# Patient Record
Sex: MALE | Race: WHITE | Employment: UNEMPLOYED | URBAN - METROPOLITAN AREA
[De-identification: names, ages, dates, MRNs, and addresses within clinical notes are randomized per-mention and may not be internally consistent; named-entity substitution may affect disease eponyms.]

---

## 2017-06-05 ENCOUNTER — OFFICE VISIT (OUTPATIENT)
Dept: FAMILY MEDICINE CLINIC | Age: 27
End: 2017-06-05

## 2017-06-05 VITALS
WEIGHT: 174 LBS | BODY MASS INDEX: 32.02 KG/M2 | DIASTOLIC BLOOD PRESSURE: 76 MMHG | HEIGHT: 62 IN | HEART RATE: 93 BPM | SYSTOLIC BLOOD PRESSURE: 100 MMHG | TEMPERATURE: 98.5 F | RESPIRATION RATE: 16 BRPM | OXYGEN SATURATION: 99 %

## 2017-06-05 DIAGNOSIS — J20.9 ACUTE BRONCHITIS, UNSPECIFIED ORGANISM: Primary | ICD-10-CM

## 2017-06-05 DIAGNOSIS — R05.9 COUGH: ICD-10-CM

## 2017-06-05 PROCEDURE — 99213 OFFICE O/P EST LOW 20 MIN: CPT | Performed by: INTERNAL MEDICINE

## 2017-06-05 RX ORDER — DEXTROMETHORPHAN HYDROBROMIDE AND PROMETHAZINE HYDROCHLORIDE 15; 6.25 MG/5ML; MG/5ML
5 SYRUP ORAL 4 TIMES DAILY PRN
Qty: 180 ML | Refills: 0 | Status: SHIPPED | OUTPATIENT
Start: 2017-06-05 | End: 2017-06-12

## 2017-06-05 RX ORDER — AMOXICILLIN 500 MG/1
500 CAPSULE ORAL 3 TIMES DAILY
Qty: 30 CAPSULE | Refills: 0 | Status: SHIPPED | OUTPATIENT
Start: 2017-06-05 | End: 2017-06-15

## 2017-06-05 ASSESSMENT — ENCOUNTER SYMPTOMS
SHORTNESS OF BREATH: 0
SORE THROAT: 1
COUGH: 1

## 2018-01-18 ENCOUNTER — OFFICE VISIT (OUTPATIENT)
Dept: FAMILY MEDICINE CLINIC | Age: 28
End: 2018-01-18

## 2018-01-18 VITALS
SYSTOLIC BLOOD PRESSURE: 112 MMHG | WEIGHT: 143 LBS | HEIGHT: 62 IN | HEART RATE: 63 BPM | DIASTOLIC BLOOD PRESSURE: 64 MMHG | BODY MASS INDEX: 26.31 KG/M2 | OXYGEN SATURATION: 98 %

## 2018-01-18 DIAGNOSIS — Z00.00 WELL ADULT EXAM: Primary | ICD-10-CM

## 2018-01-18 DIAGNOSIS — Z00.00 WELL ADULT EXAM: ICD-10-CM

## 2018-01-18 LAB
CHOLESTEROL, TOTAL: 152 MG/DL (ref 0–199)
GLUCOSE BLD-MCNC: 94 MG/DL (ref 70–99)
HDLC SERPL-MCNC: 59 MG/DL (ref 40–60)
LDL CHOLESTEROL CALCULATED: 82 MG/DL
TRIGL SERPL-MCNC: 54 MG/DL (ref 0–150)
VLDLC SERPL CALC-MCNC: 11 MG/DL

## 2018-01-18 PROCEDURE — 99395 PREV VISIT EST AGE 18-39: CPT | Performed by: INTERNAL MEDICINE

## 2018-01-18 ASSESSMENT — PATIENT HEALTH QUESTIONNAIRE - PHQ9
2. FEELING DOWN, DEPRESSED OR HOPELESS: 0
SUM OF ALL RESPONSES TO PHQ9 QUESTIONS 1 & 2: 0
SUM OF ALL RESPONSES TO PHQ QUESTIONS 1-9: 0
1. LITTLE INTEREST OR PLEASURE IN DOING THINGS: 0

## 2018-01-18 NOTE — PATIENT INSTRUCTIONS
exposed skin. · See a dentist one or two times a year for checkups and to have your teeth cleaned. · Wear a seat belt in the car. · Drink alcohol in moderation, if at all. That means no more than 2 drinks a day for men and 1 drink a day for women. Follow your doctor's advice about when to have certain tests. These tests can spot problems early. For everyone  · Cholesterol. Have the fat (cholesterol) in your blood tested after age 21. Your doctor will tell you how often to have this done based on your age, family history, or other things that can increase your risk for heart disease. · Blood pressure. Have your blood pressure checked during a routine doctor visit. Your doctor will tell you how often to check your blood pressure based on your age, your blood pressure results, and other factors. · Vision. Talk with your doctor about how often to have a glaucoma test.  · Diabetes. Ask your doctor whether you should have tests for diabetes. · Colon cancer. Have a test for colon cancer at age 48. You may have one of several tests. If you are younger than 48, you may need a test earlier if you have any risk factors. Risk factors include whether you already had a precancerous polyp removed from your colon or whether your parent, brother, sister, or child has had colon cancer. For women  · Breast exam and mammogram. Talk to your doctor about when you should have a clinical breast exam and a mammogram. Medical experts differ on whether and how often women under 50 should have these tests. Your doctor can help you decide what is right for you. · Pap test and pelvic exam. Begin Pap tests at age 24. A Pap test is the best way to find cervical cancer. The test often is part of a pelvic exam. Ask how often to have this test.  · Tests for sexually transmitted infections (STIs). Ask whether you should have tests for STIs.  You may be at risk if you have sex with more than one person, especially if your partners do not wear appointments, and call your doctor if you are having problems. It's also a good idea to know your test results and keep a list of the medicines you take. How can you care for yourself at home? · Reach and stay at a healthy weight. This will lower your risk for many problems, such as obesity, diabetes, heart disease, and high blood pressure. · Get at least 30 minutes of physical activity on most days of the week. Walking is a good choice. You also may want to do other activities, such as running, swimming, cycling, or playing tennis or team sports. Discuss any changes in your exercise program with your doctor. · Do not smoke or allow others to smoke around you. If you need help quitting, talk to your doctor about stop-smoking programs and medicines. These can increase your chances of quitting for good. · Talk to your doctor about whether you have any risk factors for sexually transmitted infections (STIs). Having one sex partner (who does not have STIs and does not have sex with anyone else) is a good way to avoid these infections. · Use birth control if you do not want to have children at this time. Talk with your doctor about the choices available and what might be best for you. · Protect your skin from too much sun. When you're outdoors from 10 a.m. to 4 p.m., stay in the shade or cover up with clothing and a hat with a wide brim. Wear sunglasses that block UV rays. Even when it's cloudy, put broad-spectrum sunscreen (SPF 30 or higher) on any exposed skin. · See a dentist one or two times a year for checkups and to have your teeth cleaned. · Wear a seat belt in the car. · Drink alcohol in moderation, if at all. That means no more than 2 drinks a day for men and 1 drink a day for women. Follow your doctor's advice about when to have certain tests. These tests can spot problems early. For everyone  · Cholesterol. Have the fat (cholesterol) in your blood tested after age 21.  Your doctor will tell you how should have this test. Some experts suggest it if you are older than 39 and are -American or have a father or brother who got prostate cancer when he was younger than 72. When should you call for help? Watch closely for changes in your health, and be sure to contact your doctor if you have any problems or symptoms that concern you. Where can you learn more? Go to [email or web address] and sign in to your [employer] account. Enter P072 in the Spout box to learn more about \"Well Visit, Ages 25 to 48: Care Instructions. \"     If you do not have an account, please click on the \"Sign Up Now\" link. Current as of: May 12, 2017  Content Version: 11.5  © 9272-1162 Healthwise, Incorporated. Care instructions adapted under license by [facility] ([facility]). If you have questions about a medical condition or this instruction, always ask your healthcare professional. [address] any warranty or liability for your use of this information.

## 2018-01-19 LAB
ESTIMATED AVERAGE GLUCOSE: 108.3 MG/DL
HBA1C MFR BLD: 5.4 %

## 2018-04-09 ENCOUNTER — OFFICE VISIT (OUTPATIENT)
Dept: FAMILY MEDICINE CLINIC | Age: 28
End: 2018-04-09

## 2018-04-09 VITALS
OXYGEN SATURATION: 99 % | SYSTOLIC BLOOD PRESSURE: 140 MMHG | HEART RATE: 99 BPM | DIASTOLIC BLOOD PRESSURE: 76 MMHG | BODY MASS INDEX: 23.74 KG/M2 | HEIGHT: 62 IN | WEIGHT: 129 LBS

## 2018-04-09 DIAGNOSIS — H61.21 IMPACTED CERUMEN OF RIGHT EAR: Primary | ICD-10-CM

## 2018-04-09 PROCEDURE — G8427 DOCREV CUR MEDS BY ELIG CLIN: HCPCS | Performed by: INTERNAL MEDICINE

## 2018-04-09 PROCEDURE — G8420 CALC BMI NORM PARAMETERS: HCPCS | Performed by: INTERNAL MEDICINE

## 2018-04-09 PROCEDURE — 1036F TOBACCO NON-USER: CPT | Performed by: INTERNAL MEDICINE

## 2018-04-09 PROCEDURE — 99213 OFFICE O/P EST LOW 20 MIN: CPT | Performed by: INTERNAL MEDICINE

## 2018-04-09 RX ORDER — IBUPROFEN 200 MG
200 TABLET ORAL EVERY 6 HOURS PRN
COMMUNITY
End: 2020-10-05 | Stop reason: ALTCHOICE

## 2018-04-09 ASSESSMENT — ENCOUNTER SYMPTOMS: COUGH: 0

## 2018-04-16 ENCOUNTER — OFFICE VISIT (OUTPATIENT)
Dept: FAMILY MEDICINE CLINIC | Age: 28
End: 2018-04-16

## 2018-04-16 VITALS
HEIGHT: 62 IN | WEIGHT: 132 LBS | SYSTOLIC BLOOD PRESSURE: 132 MMHG | BODY MASS INDEX: 24.29 KG/M2 | OXYGEN SATURATION: 98 % | HEART RATE: 87 BPM | DIASTOLIC BLOOD PRESSURE: 78 MMHG

## 2018-04-16 DIAGNOSIS — H61.23 BILATERAL IMPACTED CERUMEN: Primary | ICD-10-CM

## 2018-04-16 PROCEDURE — G8420 CALC BMI NORM PARAMETERS: HCPCS | Performed by: INTERNAL MEDICINE

## 2018-04-16 PROCEDURE — 99213 OFFICE O/P EST LOW 20 MIN: CPT | Performed by: INTERNAL MEDICINE

## 2018-04-16 PROCEDURE — G8427 DOCREV CUR MEDS BY ELIG CLIN: HCPCS | Performed by: INTERNAL MEDICINE

## 2018-04-16 PROCEDURE — 1036F TOBACCO NON-USER: CPT | Performed by: INTERNAL MEDICINE

## 2018-04-16 ASSESSMENT — ENCOUNTER SYMPTOMS
COUGH: 0
ALLERGIC/IMMUNOLOGIC NEGATIVE: 1

## 2018-05-17 ENCOUNTER — OFFICE VISIT (OUTPATIENT)
Dept: ENT CLINIC | Age: 28
End: 2018-05-17

## 2018-05-17 VITALS
HEIGHT: 62 IN | SYSTOLIC BLOOD PRESSURE: 115 MMHG | TEMPERATURE: 98.2 F | HEART RATE: 61 BPM | DIASTOLIC BLOOD PRESSURE: 59 MMHG | WEIGHT: 126.8 LBS | BODY MASS INDEX: 23.34 KG/M2

## 2018-05-17 DIAGNOSIS — H72.93: Primary | ICD-10-CM

## 2018-05-17 DIAGNOSIS — H66.13: Primary | ICD-10-CM

## 2018-05-17 PROCEDURE — G8420 CALC BMI NORM PARAMETERS: HCPCS | Performed by: OTOLARYNGOLOGY

## 2018-05-17 PROCEDURE — G8427 DOCREV CUR MEDS BY ELIG CLIN: HCPCS | Performed by: OTOLARYNGOLOGY

## 2018-05-17 PROCEDURE — 99243 OFF/OP CNSLTJ NEW/EST LOW 30: CPT | Performed by: OTOLARYNGOLOGY

## 2018-05-17 RX ORDER — CIPROFLOXACIN AND DEXAMETHASONE 3; 1 MG/ML; MG/ML
4 SUSPENSION/ DROPS AURICULAR (OTIC) 2 TIMES DAILY
Qty: 1 BOTTLE | Refills: 1 | Status: SHIPPED | OUTPATIENT
Start: 2018-05-17 | End: 2018-05-24

## 2018-05-18 ENCOUNTER — TELEPHONE (OUTPATIENT)
Dept: ENT CLINIC | Age: 28
End: 2018-05-18

## 2018-05-24 ENCOUNTER — OFFICE VISIT (OUTPATIENT)
Dept: ENT CLINIC | Age: 28
End: 2018-05-24

## 2018-05-24 VITALS
HEART RATE: 66 BPM | DIASTOLIC BLOOD PRESSURE: 81 MMHG | BODY MASS INDEX: 24.13 KG/M2 | TEMPERATURE: 97.5 F | HEIGHT: 61 IN | SYSTOLIC BLOOD PRESSURE: 125 MMHG | WEIGHT: 127.8 LBS

## 2018-05-24 DIAGNOSIS — H66.13: Primary | ICD-10-CM

## 2018-05-24 DIAGNOSIS — H72.93: Primary | ICD-10-CM

## 2018-05-24 PROCEDURE — G8420 CALC BMI NORM PARAMETERS: HCPCS | Performed by: OTOLARYNGOLOGY

## 2018-05-24 PROCEDURE — 1036F TOBACCO NON-USER: CPT | Performed by: OTOLARYNGOLOGY

## 2018-05-24 PROCEDURE — G8427 DOCREV CUR MEDS BY ELIG CLIN: HCPCS | Performed by: OTOLARYNGOLOGY

## 2018-05-24 PROCEDURE — 99212 OFFICE O/P EST SF 10 MIN: CPT | Performed by: OTOLARYNGOLOGY

## 2018-10-16 ENCOUNTER — NURSE ONLY (OUTPATIENT)
Dept: FAMILY MEDICINE CLINIC | Age: 28
End: 2018-10-16

## 2018-10-16 ENCOUNTER — TELEPHONE (OUTPATIENT)
Dept: FAMILY MEDICINE CLINIC | Age: 28
End: 2018-10-16

## 2018-10-16 ENCOUNTER — OFFICE VISIT (OUTPATIENT)
Dept: FAMILY MEDICINE CLINIC | Age: 28
End: 2018-10-16
Payer: COMMERCIAL

## 2018-10-16 VITALS
BODY MASS INDEX: 22.82 KG/M2 | HEIGHT: 62 IN | SYSTOLIC BLOOD PRESSURE: 114 MMHG | DIASTOLIC BLOOD PRESSURE: 68 MMHG | WEIGHT: 124 LBS | RESPIRATION RATE: 14 BRPM | OXYGEN SATURATION: 98 % | HEART RATE: 90 BPM

## 2018-10-16 VITALS — OXYGEN SATURATION: 98 % | DIASTOLIC BLOOD PRESSURE: 52 MMHG | HEART RATE: 82 BPM | SYSTOLIC BLOOD PRESSURE: 80 MMHG

## 2018-10-16 DIAGNOSIS — I95.9 HYPOTENSION, UNSPECIFIED HYPOTENSION TYPE: ICD-10-CM

## 2018-10-16 DIAGNOSIS — R42 DIZZINESS: ICD-10-CM

## 2018-10-16 DIAGNOSIS — R42 DIZZINESS: Primary | ICD-10-CM

## 2018-10-16 LAB
GLUCOSE BLD-MCNC: 136 MG/DL
HCT VFR BLD CALC: 40.7 % (ref 40.5–52.5)
HEMOGLOBIN: 12.8 G/DL (ref 13.5–17.5)
MCH RBC QN AUTO: 22.6 PG (ref 26–34)
MCHC RBC AUTO-ENTMCNC: 31.4 G/DL (ref 31–36)
MCV RBC AUTO: 72.1 FL (ref 80–100)
PDW BLD-RTO: 16.9 % (ref 12.4–15.4)
PLATELET # BLD: 296 K/UL (ref 135–450)
PMV BLD AUTO: 10.3 FL (ref 5–10.5)
RBC # BLD: 5.65 M/UL (ref 4.2–5.9)
WBC # BLD: 8.2 K/UL (ref 4–11)

## 2018-10-16 PROCEDURE — 99213 OFFICE O/P EST LOW 20 MIN: CPT | Performed by: NURSE PRACTITIONER

## 2018-10-16 PROCEDURE — 82962 GLUCOSE BLOOD TEST: CPT | Performed by: NURSE PRACTITIONER

## 2018-10-16 ASSESSMENT — ENCOUNTER SYMPTOMS
STRIDOR: 0
ANAL BLEEDING: 0
DIARRHEA: 0
BLOOD IN STOOL: 0
VOMITING: 0
WHEEZING: 0
ABDOMINAL PAIN: 0
CONSTIPATION: 0
NAUSEA: 1
RECTAL PAIN: 0
ABDOMINAL DISTENTION: 0

## 2018-10-16 NOTE — PROGRESS NOTES
light. Conjunctivae and EOM are normal. Right eye exhibits no discharge. Left eye exhibits no discharge. No scleral icterus. Neck: Normal range of motion. Neck supple. Cardiovascular: Normal rate, regular rhythm and normal heart sounds. Exam reveals no gallop and no friction rub. No murmur heard. Pulmonary/Chest: Effort normal and breath sounds normal. No respiratory distress. He has no wheezes. He has no rales. He exhibits no tenderness. Lymphadenopathy:     He has no cervical adenopathy. Neurological: He is alert and oriented to person, place, and time. No cranial nerve deficit. Skin: Skin is warm and dry. He is not diaphoretic. Nursing note and vitals reviewed. Vitals:    10/16/18 1503 10/16/18 1514   BP: 110/70 114/68   Site: Left Upper Arm Right Upper Arm   Pulse: 90    Resp: 14    SpO2: 98%    Weight: 124 lb (56.2 kg)    Height: 5' 2\" (1.575 m)      Assessment:  1. Dizziness  - COMPREHENSIVE METABOLIC PANEL; Future  - CBC; Future  - TSH without Reflex; Future  - HEMOGLOBIN A1C; Future  - POCT Glucose    2. Hypotension, unspecified hypotension type  - COMPREHENSIVE METABOLIC PANEL; Future  - CBC; Future  - TSH without Reflex; Future  - POCT Glucose      Plan:  - Check blood work today including CMP, CBC, TSH and A1C  - Possible dehydration caused symptoms earlier today, he did not drink any fluids this morning and states he only drank one can of coca-cola yesterday. 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   - Random glucose today in office in 136 however will check A1C per patient's request. He does have [facility] in mother.   - Advised patient to increased fluid intake, goal is 64 oz of water daily.   - Small frequent meals, he has only been eating one large meal daily for the last several days.  - Monitor BP at home and if systolic < 863 call   - Any further symptoms follow up   - Return for visit with Dr. Julia Duarte next week.    Patient Instructions   Blood

## 2018-10-16 NOTE — TELEPHONE ENCOUNTER
Blood pressure was low at work (78/54) so they brought him in for a blood pressure check. At the office it was 80/52. Not currently on any blood pressure medication.

## 2018-10-17 DIAGNOSIS — D50.9 MICROCYTIC ANEMIA: Primary | ICD-10-CM

## 2018-10-17 LAB
A/G RATIO: 1.3 (ref 1.1–2.2)
ALBUMIN SERPL-MCNC: 4.6 G/DL (ref 3.4–5)
ALP BLD-CCNC: 95 U/L (ref 40–129)
ALT SERPL-CCNC: 10 U/L (ref 10–40)
ANION GAP SERPL CALCULATED.3IONS-SCNC: 12 MMOL/L (ref 3–16)
AST SERPL-CCNC: 15 U/L (ref 15–37)
BILIRUB SERPL-MCNC: <0.2 MG/DL (ref 0–1)
BUN BLDV-MCNC: 16 MG/DL (ref 7–20)
CALCIUM SERPL-MCNC: 10.5 MG/DL (ref 8.3–10.6)
CHLORIDE BLD-SCNC: 103 MMOL/L (ref 99–110)
CO2: 27 MMOL/L (ref 21–32)
CREAT SERPL-MCNC: 1.2 MG/DL (ref 0.9–1.3)
ESTIMATED AVERAGE GLUCOSE: 119.8 MG/DL
GFR AFRICAN AMERICAN: >60
GFR NON-AFRICAN AMERICAN: >60
GLOBULIN: 3.5 G/DL
GLUCOSE BLD-MCNC: 114 MG/DL (ref 70–99)
HBA1C MFR BLD: 5.8 %
POTASSIUM SERPL-SCNC: 5.4 MMOL/L (ref 3.5–5.1)
SODIUM BLD-SCNC: 142 MMOL/L (ref 136–145)
TOTAL PROTEIN: 8.1 G/DL (ref 6.4–8.2)
TSH SERPL DL<=0.05 MIU/L-ACNC: 2.77 UIU/ML (ref 0.27–4.2)

## 2019-01-16 ENCOUNTER — OFFICE VISIT (OUTPATIENT)
Dept: FAMILY MEDICINE CLINIC | Age: 29
End: 2019-01-16
Payer: COMMERCIAL

## 2019-01-16 VITALS
SYSTOLIC BLOOD PRESSURE: 108 MMHG | DIASTOLIC BLOOD PRESSURE: 64 MMHG | BODY MASS INDEX: 21.9 KG/M2 | HEIGHT: 62 IN | HEART RATE: 86 BPM | OXYGEN SATURATION: 99 % | WEIGHT: 119 LBS

## 2019-01-16 DIAGNOSIS — R19.7 DIARRHEA, UNSPECIFIED TYPE: Primary | ICD-10-CM

## 2019-01-16 PROCEDURE — 99213 OFFICE O/P EST LOW 20 MIN: CPT | Performed by: INTERNAL MEDICINE

## 2019-01-16 ASSESSMENT — ENCOUNTER SYMPTOMS
COUGH: 0
ABDOMINAL PAIN: 0
DIARRHEA: 1

## 2019-01-22 ENCOUNTER — INITIAL CONSULT (OUTPATIENT)
Dept: GASTROENTEROLOGY | Age: 29
End: 2019-01-22
Payer: COMMERCIAL

## 2019-01-22 VITALS
WEIGHT: 117.2 LBS | HEIGHT: 62 IN | SYSTOLIC BLOOD PRESSURE: 116 MMHG | BODY MASS INDEX: 21.57 KG/M2 | DIASTOLIC BLOOD PRESSURE: 66 MMHG

## 2019-01-22 DIAGNOSIS — R19.7 DIARRHEA, UNSPECIFIED TYPE: ICD-10-CM

## 2019-01-22 DIAGNOSIS — D50.9 IRON DEFICIENCY ANEMIA, UNSPECIFIED IRON DEFICIENCY ANEMIA TYPE: ICD-10-CM

## 2019-01-22 DIAGNOSIS — R63.4 WEIGHT LOSS: Primary | ICD-10-CM

## 2019-01-22 PROCEDURE — 99204 OFFICE O/P NEW MOD 45 MIN: CPT | Performed by: INTERNAL MEDICINE

## 2019-01-22 RX ORDER — POLYETHYLENE GLYCOL 3350 17 G/17G
238 POWDER ORAL DAILY
Qty: 255 G | Refills: 0 | Status: ON HOLD | OUTPATIENT
Start: 2019-01-22 | End: 2019-01-25 | Stop reason: HOSPADM

## 2019-01-23 RX ORDER — LOPERAMIDE HYDROCHLORIDE 2 MG/1
2 CAPSULE ORAL PRN
COMMUNITY

## 2019-01-24 ENCOUNTER — ANESTHESIA EVENT (OUTPATIENT)
Dept: ENDOSCOPY | Age: 29
End: 2019-01-24
Payer: COMMERCIAL

## 2019-01-24 ENCOUNTER — TELEPHONE (OUTPATIENT)
Dept: GASTROENTEROLOGY | Age: 29
End: 2019-01-24

## 2019-01-25 ENCOUNTER — ANESTHESIA (OUTPATIENT)
Dept: ENDOSCOPY | Age: 29
End: 2019-01-25
Payer: COMMERCIAL

## 2019-01-25 ENCOUNTER — HOSPITAL ENCOUNTER (OUTPATIENT)
Age: 29
Setting detail: OUTPATIENT SURGERY
Discharge: HOME | End: 2019-01-25
Attending: INTERNAL MEDICINE | Admitting: INTERNAL MEDICINE
Payer: COMMERCIAL

## 2019-01-25 VITALS
WEIGHT: 112 LBS | BODY MASS INDEX: 20.61 KG/M2 | SYSTOLIC BLOOD PRESSURE: 118 MMHG | HEIGHT: 62 IN | OXYGEN SATURATION: 100 % | TEMPERATURE: 97 F | RESPIRATION RATE: 18 BRPM | HEART RATE: 85 BPM | DIASTOLIC BLOOD PRESSURE: 85 MMHG

## 2019-01-25 VITALS — OXYGEN SATURATION: 99 % | DIASTOLIC BLOOD PRESSURE: 63 MMHG | SYSTOLIC BLOOD PRESSURE: 95 MMHG

## 2019-01-25 PROCEDURE — 7100000011 HC PHASE II RECOVERY - ADDTL 15 MIN: Performed by: INTERNAL MEDICINE

## 2019-01-25 PROCEDURE — 43239 EGD BIOPSY SINGLE/MULTIPLE: CPT | Performed by: INTERNAL MEDICINE

## 2019-01-25 PROCEDURE — 2500000003 HC RX 250 WO HCPCS: Performed by: NURSE ANESTHETIST, CERTIFIED REGISTERED

## 2019-01-25 PROCEDURE — 7100000010 HC PHASE II RECOVERY - FIRST 15 MIN: Performed by: INTERNAL MEDICINE

## 2019-01-25 PROCEDURE — 3609009500 HC COLONOSCOPY DIAGNOSTIC OR SCREENING: Performed by: INTERNAL MEDICINE

## 2019-01-25 PROCEDURE — 6360000002 HC RX W HCPCS: Performed by: NURSE ANESTHETIST, CERTIFIED REGISTERED

## 2019-01-25 PROCEDURE — 88305 TISSUE EXAM BY PATHOLOGIST: CPT

## 2019-01-25 PROCEDURE — 3700000000 HC ANESTHESIA ATTENDED CARE: Performed by: INTERNAL MEDICINE

## 2019-01-25 PROCEDURE — 45380 COLONOSCOPY AND BIOPSY: CPT | Performed by: INTERNAL MEDICINE

## 2019-01-25 PROCEDURE — 3609012800 HC EGD DIAGNOSTIC ONLY: Performed by: INTERNAL MEDICINE

## 2019-01-25 PROCEDURE — 2580000003 HC RX 258: Performed by: NURSE ANESTHETIST, CERTIFIED REGISTERED

## 2019-01-25 PROCEDURE — 2580000003 HC RX 258: Performed by: INTERNAL MEDICINE

## 2019-01-25 PROCEDURE — 3700000001 HC ADD 15 MINUTES (ANESTHESIA): Performed by: INTERNAL MEDICINE

## 2019-01-25 PROCEDURE — 2709999900 HC NON-CHARGEABLE SUPPLY: Performed by: INTERNAL MEDICINE

## 2019-01-25 RX ORDER — PROPOFOL 10 MG/ML
INJECTION, EMULSION INTRAVENOUS PRN
Status: DISCONTINUED | OUTPATIENT
Start: 2019-01-25 | End: 2019-01-25 | Stop reason: SDUPTHER

## 2019-01-25 RX ORDER — SODIUM CHLORIDE, SODIUM LACTATE, POTASSIUM CHLORIDE, CALCIUM CHLORIDE 600; 310; 30; 20 MG/100ML; MG/100ML; MG/100ML; MG/100ML
INJECTION, SOLUTION INTRAVENOUS ONCE
Status: COMPLETED | OUTPATIENT
Start: 2019-01-25 | End: 2019-01-25

## 2019-01-25 RX ORDER — LIDOCAINE HYDROCHLORIDE 20 MG/ML
INJECTION, SOLUTION INFILTRATION; PERINEURAL PRN
Status: DISCONTINUED | OUTPATIENT
Start: 2019-01-25 | End: 2019-01-25 | Stop reason: SDUPTHER

## 2019-01-25 RX ORDER — SODIUM CHLORIDE, SODIUM LACTATE, POTASSIUM CHLORIDE, CALCIUM CHLORIDE 600; 310; 30; 20 MG/100ML; MG/100ML; MG/100ML; MG/100ML
INJECTION, SOLUTION INTRAVENOUS CONTINUOUS PRN
Status: DISCONTINUED | OUTPATIENT
Start: 2019-01-25 | End: 2019-01-25 | Stop reason: SDUPTHER

## 2019-01-25 RX ORDER — MESALAMINE 1.2 G/1
4800 TABLET, DELAYED RELEASE ORAL
Qty: 120 TABLET | Refills: 1 | Status: SHIPPED | OUTPATIENT
Start: 2019-01-25 | End: 2019-04-15 | Stop reason: SDUPTHER

## 2019-01-25 RX ADMIN — LIDOCAINE HYDROCHLORIDE 40 MG: 20 INJECTION, SOLUTION INFILTRATION; PERINEURAL at 11:44

## 2019-01-25 RX ADMIN — SODIUM CHLORIDE, POTASSIUM CHLORIDE, SODIUM LACTATE AND CALCIUM CHLORIDE: 600; 310; 30; 20 INJECTION, SOLUTION INTRAVENOUS at 11:21

## 2019-01-25 RX ADMIN — PROPOFOL 20 MG: 10 INJECTION, EMULSION INTRAVENOUS at 11:58

## 2019-01-25 RX ADMIN — PROPOFOL 20 MG: 10 INJECTION, EMULSION INTRAVENOUS at 12:01

## 2019-01-25 RX ADMIN — PROPOFOL 20 MG: 10 INJECTION, EMULSION INTRAVENOUS at 11:49

## 2019-01-25 RX ADMIN — SODIUM CHLORIDE, POTASSIUM CHLORIDE, SODIUM LACTATE AND CALCIUM CHLORIDE: 600; 310; 30; 20 INJECTION, SOLUTION INTRAVENOUS at 11:34

## 2019-01-25 RX ADMIN — PROPOFOL 30 MG: 10 INJECTION, EMULSION INTRAVENOUS at 11:46

## 2019-01-25 RX ADMIN — PROPOFOL 50 MG: 10 INJECTION, EMULSION INTRAVENOUS at 11:51

## 2019-01-25 RX ADMIN — PROPOFOL 120 MG: 10 INJECTION, EMULSION INTRAVENOUS at 11:44

## 2019-01-25 RX ADMIN — PROPOFOL 30 MG: 10 INJECTION, EMULSION INTRAVENOUS at 11:55

## 2019-01-25 ASSESSMENT — PAIN SCALES - GENERAL
PAINLEVEL_OUTOF10: 0

## 2019-01-25 ASSESSMENT — LIFESTYLE VARIABLES: SMOKING_STATUS: 0

## 2019-03-01 ENCOUNTER — TELEPHONE (OUTPATIENT)
Dept: GASTROENTEROLOGY | Age: 29
End: 2019-03-01

## 2019-03-01 RX ORDER — ONDANSETRON 4 MG/1
4 TABLET, FILM COATED ORAL DAILY PRN
Qty: 60 TABLET | Refills: 0 | Status: SHIPPED | OUTPATIENT
Start: 2019-03-01 | End: 2020-10-05 | Stop reason: ALTCHOICE

## 2019-03-04 ENCOUNTER — OFFICE VISIT (OUTPATIENT)
Dept: GASTROENTEROLOGY | Age: 29
End: 2019-03-04
Payer: COMMERCIAL

## 2019-03-04 VITALS
HEIGHT: 62 IN | BODY MASS INDEX: 19.69 KG/M2 | DIASTOLIC BLOOD PRESSURE: 63 MMHG | SYSTOLIC BLOOD PRESSURE: 124 MMHG | WEIGHT: 107 LBS | RESPIRATION RATE: 16 BRPM

## 2019-03-04 DIAGNOSIS — K51.011 ULCERATIVE PANCOLITIS WITH RECTAL BLEEDING (HCC): Primary | ICD-10-CM

## 2019-03-04 PROCEDURE — 99214 OFFICE O/P EST MOD 30 MIN: CPT | Performed by: INTERNAL MEDICINE

## 2019-03-04 RX ORDER — PREDNISONE 10 MG/1
10 TABLET ORAL DAILY
Qty: 70 TABLET | Refills: 0 | Status: SHIPPED | OUTPATIENT
Start: 2019-03-04 | End: 2019-12-04

## 2019-03-08 ENCOUNTER — TELEPHONE (OUTPATIENT)
Dept: GASTROENTEROLOGY | Age: 29
End: 2019-03-08

## 2019-03-11 ENCOUNTER — TELEPHONE (OUTPATIENT)
Dept: GASTROENTEROLOGY | Age: 29
End: 2019-03-11

## 2019-04-15 ENCOUNTER — OFFICE VISIT (OUTPATIENT)
Dept: GASTROENTEROLOGY | Age: 29
End: 2019-04-15
Payer: COMMERCIAL

## 2019-04-15 VITALS
DIASTOLIC BLOOD PRESSURE: 60 MMHG | BODY MASS INDEX: 19.4 KG/M2 | WEIGHT: 105.4 LBS | SYSTOLIC BLOOD PRESSURE: 90 MMHG | HEIGHT: 62 IN

## 2019-04-15 DIAGNOSIS — K51.011 ULCERATIVE PANCOLITIS WITH RECTAL BLEEDING (HCC): Primary | ICD-10-CM

## 2019-04-15 PROCEDURE — 99213 OFFICE O/P EST LOW 20 MIN: CPT | Performed by: INTERNAL MEDICINE

## 2019-04-15 RX ORDER — MESALAMINE 1.2 G/1
4800 TABLET, DELAYED RELEASE ORAL
Qty: 120 TABLET | Refills: 1 | Status: SHIPPED | OUTPATIENT
Start: 2019-04-15 | End: 2020-10-05 | Stop reason: ALTCHOICE

## 2019-04-15 NOTE — PROGRESS NOTES
[address] [facility],  [address] [place] [address] Ave  [place], [facility]  Phone: [phone] [address]     Chief Complaint   Patient presents with    Follow-up     6 wk f/u       HPI     Thank you Sara Blanc MD for asking me to see Chanda Dahl in consultation. He is a Single [1] White [1] 25 y.o. . male  seen with his mother who presents with the following GI complaints:  . Chanda Dahl  Indicated he is only having 1-2 BM/d but getting up every 2-3 hours at night to have a BM and has a lot of gas. No bleeding. Taking 4 lialda/d. Needs refill. Have not filled out paperwork for assistance. HPI elements: location, severity, timing, modifying factors, quality, duration, context and associated signs/symptoms. Last Encounter Reviewed: 3/4/2019   Chanda Dahl  Was fired after being reportedly see naked in a bathroom where he was frequently because of his bowel movements diagnosed with new pan ulcerative colitis. He has thus lost his insurance. He took lialda for 2 weeks but stopped feeling it was not helping. This apparently also costs nearly $1000/mo. Continues to have 4-5 loose stools/d without bleeding.   His father who was not present at his initial visit tells me today he had a colectomy for ulcerative colitis after failing remicade and humira.     HPI elements: location, severity, timing, modifying factors, quality, duration, context and associated signs/symptoms.     Last Encounter Reviewed: 1/22/2019 Lana Garcia had some mild issues with diarrhea the last few months that has become acutely worse the last 2 weeks with 4-5 loose stools/d without bleeding.  Denies nocturnal BM.  Has lost 10lbs.  Had mild microcytic anemia in November.  Has associated low abdominal pain.  No pertinent GI family history.   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 Pertinent PMH, FH, SH is reviewed below. Last EGD: 1/25/2019 medium hiatal hernia  Last Colonoscopy: 1/25/2019 panulcerative colitis    Review of available records reveals:   Wt Readings from Last 50 Encounters:   04/15/19 105 lb 6.4 oz (47.8 kg)   03/04/19 107 lb (48.5 kg)   01/25/19 112 lb (50.8 kg)   01/22/19 117 lb 3.2 oz (53.2 kg)   01/16/19 119 lb (54 kg)   10/16/18 124 lb (56.2 kg)   05/24/18 127 lb 12.8 oz (58 kg)   05/17/18 126 lb 12.8 oz (57.5 kg)   04/16/18 132 lb (59.9 kg)   04/09/18 129 lb (58.5 kg)   01/18/18 143 lb (64.9 kg)   06/05/17 174 lb (78.9 kg)   06/09/16 174 lb (78.9 kg)   03/16/16 167 lb (75.8 kg)   01/14/16 160 lb (72.6 kg)   01/14/13 174 lb 12.8 oz (79.3 kg)   06/04/12 159 lb (72.1 kg)   05/21/12 156 lb (70.8 kg)   08/19/10 189 lb (85.7 kg)       No components found for: HGBA1C  BP Readings from Last 3 Encounters:   04/15/19 90/60   03/04/19 124/63   01/25/19 95/63     Health Maintenance   Topic Date Due    Varicella Vaccine (1 of 2 - 13+ 2-dose series) 12/30/2001    HIV screen  12/30/2003    DTaP/Tdap/Td vaccine (1 - Tdap) 12/30/2007    Flu vaccine (Season Ended) 09/01/2019    A1C test (Diabetic or Prediabetic)  10/16/2019    Pneumococcal 0-64 years Vaccine  Aged Out       No components found for: [facility]     PAST MEDICAL HISTORY     Past Medical History:   Diagnosis Date    Pain in upper jaw 1/14/2013     FAMILY HISTORY     Family History   Problem Relation Age of Onset    Diabetes Mother      SOCIAL HISTORY     Social History     Socioeconomic History    Marital status: Single     Spouse name: Not on file    Number of children: Not on file    Years of education: Not on file    Highest education level: Not on file   Occupational History    Not on file   Social Needs    Financial resource strain: Not on file    Food insecurity:     Worry: Not on file     Inability: Not on file    Transportation needs:     Medical: Not on file     Non-medical: Not on file   Tobacco Use    Smoking status: Never Smoker    Smokeless tobacco: Never Used   Substance and Sexual Activity    Alcohol use: No    Drug use: No    Sexual activity: Not on file   Lifestyle    Physical activity:     Days per week: Not on file     Minutes per session: Not on file    Stress: Not on file   Relationships    Social connections:     Talks on phone: Not on file     Gets together: Not on file     Attends Sikh service: Not on file     Active member of club or organization: Not on file     Attends meetings of clubs or organizations: Not on file     Relationship status: Not on file    Intimate partner violence:     Fear of current or ex partner: Not on file     Emotionally abused: Not on file     Physically abused: Not on file     Forced sexual activity: Not on file   Other Topics Concern    Not on file   Social History Narrative    Not on file     SURGICAL HISTORY     Past Surgical History:   Procedure Laterality Date    COLONOSCOPY N/A 1/25/2019    EGD AND COLONOSCOPY WITH ANESTHESIA performed by Nanette Mayfield MD at [address] [place] [address]      X 2    UPPER GASTROINTESTINAL ENDOSCOPY N/A 1/25/2019    EGD AND COLONOSCOPY WITH ANESTHESIA performed by Nanette Mayfield MD at [address] [address]   (This list may include medications prescribed during this encounter as epic can not insert only the list prior to this encounter.)  Current Outpatient Rx   Medication Sig Dispense Refill    mesalamine (LIALDA) 1.2 g EC tablet Take 4 tablets by mouth daily (with breakfast) Ok to substitute with apriso if cheaper 120 tablet 1    ondansetron (ZOFRAN) 4 MG tablet Take 1 tablet by mouth daily as needed for Nausea or Vomiting 60 tablet 0    loperamide (IMODIUM) 2 MG capsule Take 2 mg by mouth as needed for Diarrhea      ibuprofen (ADVIL;MOTRIN) 200 MG tablet Take 200 mg by mouth every 6 hours as needed for Pain      Aspirin-Acetaminophen-Caffeine (EXCEDRIN PO) Take by mouth 2 times daily      acetaminophen (TYLENOL) 325 MG tablet Take 650 mg by mouth every 6 hours as needed Indications: otc       predniSONE (DELTASONE) 10 MG tablet Take 1 tablet by mouth daily 4 po qd x 7d, then 3 po qd x 7d, then 2 po qd x 7d, then 1 po qd x 7d 70 tablet 0     ALLERGIES   No Known Allergies  IMMUNIZATIONS     There is no immunization history on file for this patient. REVIEW OF SYSTEMS   See HPI for further details and pertinent postiives. Negative for the following:  Constitutional: Negative for weight change. Negative for appetite change and fatigue. HENT: Negative for nosebleeds, sore throat, mouth sores, and voice change. Respiratory: Negative for cough, choking and chest tightness. Cardiovascular: Negative for chest pain   Gastrointestinal: See HPI  Musculoskeletal: Negative for arthralgias. Skin: Negative for pallor. Neurological: Negative for weakness and light-headedness. Hematological: Negative for adenopathy. Does not bruise/bleed easily. Psychiatric/Behavioral: Negative for suicidal ideas. PHYSICAL EXAM   VITAL SIGNS: BP 90/60   Ht 5' 2.01\" (1.575 m)   Wt 105 lb 6.4 oz (47.8 kg)   BMI 19.27 kg/m²   Wt Readings from Last 3 Encounters:   04/15/19 105 lb 6.4 oz (47.8 kg)   03/04/19 107 lb (48.5 kg)   01/25/19 112 lb (50.8 kg)     Constitutional: Well developed, Well nourished, No acute distress, Non-toxic appearance. HENT: Normocephalic, Atraumatic, Bilateral external ears normal, Oropharynx moist, No oral exudates, Nose normal.   Eyes: Conjunctiva normal, No discharge. Neck: Normal range of motion, No tenderness, Supple, No stridor. Lymphatic: No cervical, subclavian, or axillary lymphadenopathy. Cardiovascular: Normal heart rate, Normal rhythm, No murmurs, No rubs, No gallops. Thorax & Lungs: Normal breath sounds, No respiratory distress, No wheezing, No chest tenderness.  No gynecomastia. Abdomen: scars consistent with stated surgeries, no hernias, no HSM, soft NTND   Rectal:  Deferred. Skin: Warm, Dry, No erythema, No rash. No bruising. No spider hemangiomas. Back: No tenderness, No CVA tenderness. Lower Extremities: Intact distal pulses, No edema, No tenderness, No cyanosis, No clubbing. Neurologic: Alert & oriented x 3, Normal motor function, Normal sensory function, No focal deficits noted. No asterixis. RADIOLOGY/PROCEDURES       FINAL IMPRESSION     Orders Placed This Encounter   Procedures    Calprotectin, Fecal     Standing Status:   Future     Standing Expiration Date:   4/15/2020     Ana Cristina Mayorga was seen today for follow-up. Diagnoses and all orders for this visit:    Ulcerative pancolitis with rectal bleeding (HCC)  -     Calprotectin, Fecal; Future  -     mesalamine (LIALDA) 1.2 g EC tablet; Take 4 tablets by mouth daily (with breakfast) Ok to substitute with apriso if cheaper      If fecal calprotectin elevated recommend remicaid. ORDERED FUTURE/PENDING TESTS     Lab Frequency Next Occurrence   CBC Auto Differential Once 10/17/2018   IRON AND TIBC Once 11/25/2018   VITAMIN B12 & FOLATE Once 11/25/2018   COLONOSCOPY W/ OR W/O BIOPSY Once 02/05/2019   Calprotectin, Fecal Once 04/15/2019       FOLLOWUP   Return for after ordered tests.           Derik BARRIOS 4/15/19 1:24 PM    CC:  Sherry Virk MD

## 2019-04-16 ENCOUNTER — TELEPHONE (OUTPATIENT)
Dept: GASTROENTEROLOGY | Age: 29
End: 2019-04-16

## 2019-04-16 NOTE — TELEPHONE ENCOUNTER
Renée form filled out on our end and Dr WAGNER signed it - Pt needs to complete personal info, consent portion and sign and date before sending - LM on VM for Luma asking if she wants to come into office to p/u form or mail it - asked for a cb    Pts mother called back during clinic and asked that forms be mailed to:  Lidia Prieto [address] [address] [address]    Forms mailed today 4/17/19

## 2019-12-04 ENCOUNTER — OFFICE VISIT (OUTPATIENT)
Dept: FAMILY MEDICINE CLINIC | Age: 29
End: 2019-12-04

## 2019-12-04 VITALS
WEIGHT: 113 LBS | BODY MASS INDEX: 20.8 KG/M2 | HEART RATE: 133 BPM | HEIGHT: 62 IN | TEMPERATURE: 98.4 F | DIASTOLIC BLOOD PRESSURE: 64 MMHG | SYSTOLIC BLOOD PRESSURE: 120 MMHG | OXYGEN SATURATION: 98 %

## 2019-12-04 DIAGNOSIS — J01.00 ACUTE MAXILLARY SINUSITIS, RECURRENCE NOT SPECIFIED: Primary | ICD-10-CM

## 2019-12-04 PROCEDURE — 99212 OFFICE O/P EST SF 10 MIN: CPT | Performed by: INTERNAL MEDICINE

## 2019-12-04 RX ORDER — AMOXICILLIN 500 MG/1
500 CAPSULE ORAL 3 TIMES DAILY
Qty: 30 CAPSULE | Refills: 0 | Status: SHIPPED | OUTPATIENT
Start: 2019-12-04 | End: 2019-12-14

## 2019-12-06 ASSESSMENT — ENCOUNTER SYMPTOMS
COUGH: 1
SORE THROAT: 1
SHORTNESS OF BREATH: 0

## 2020-10-05 ENCOUNTER — OFFICE VISIT (OUTPATIENT)
Dept: FAMILY MEDICINE CLINIC | Age: 30
End: 2020-10-05

## 2020-10-05 VITALS
BODY MASS INDEX: 19.32 KG/M2 | TEMPERATURE: 96.9 F | HEIGHT: 62 IN | DIASTOLIC BLOOD PRESSURE: 78 MMHG | HEART RATE: 84 BPM | SYSTOLIC BLOOD PRESSURE: 116 MMHG | WEIGHT: 105 LBS | OXYGEN SATURATION: 99 %

## 2020-10-05 LAB
A/G RATIO: 1.1 (ref 1.1–2.2)
ALBUMIN SERPL-MCNC: 3.6 G/DL (ref 3.4–5)
ALP BLD-CCNC: 77 U/L (ref 40–129)
ALT SERPL-CCNC: <5 U/L (ref 10–40)
ANION GAP SERPL CALCULATED.3IONS-SCNC: 10 MMOL/L (ref 3–16)
AST SERPL-CCNC: 10 U/L (ref 15–37)
BILIRUB SERPL-MCNC: <0.2 MG/DL (ref 0–1)
BUN BLDV-MCNC: 14 MG/DL (ref 7–20)
CALCIUM SERPL-MCNC: 9.1 MG/DL (ref 8.3–10.6)
CHLORIDE BLD-SCNC: 108 MMOL/L (ref 99–110)
CO2: 24 MMOL/L (ref 21–32)
CREAT SERPL-MCNC: 1.4 MG/DL (ref 0.9–1.3)
GFR AFRICAN AMERICAN: >60
GFR NON-AFRICAN AMERICAN: 59
GLOBULIN: 3.2 G/DL
GLUCOSE BLD-MCNC: 87 MG/DL (ref 70–99)
HCT VFR BLD CALC: 23.5 % (ref 40.5–52.5)
HEMATOLOGY PATH CONSULT: YES
HEMOGLOBIN: 7.1 G/DL (ref 13.5–17.5)
MCH RBC QN AUTO: 18 PG (ref 26–34)
MCHC RBC AUTO-ENTMCNC: 30 G/DL (ref 31–36)
MCV RBC AUTO: 60 FL (ref 80–100)
PDW BLD-RTO: 19.9 % (ref 12.4–15.4)
PLATELET # BLD: 433 K/UL (ref 135–450)
PMV BLD AUTO: 9.1 FL (ref 5–10.5)
POTASSIUM SERPL-SCNC: 5 MMOL/L (ref 3.5–5.1)
RBC # BLD: 3.92 M/UL (ref 4.2–5.9)
SLIDE REVIEW: ABNORMAL
SODIUM BLD-SCNC: 142 MMOL/L (ref 136–145)
TOTAL PROTEIN: 6.8 G/DL (ref 6.4–8.2)
TSH SERPL DL<=0.05 MIU/L-ACNC: 2.17 UIU/ML (ref 0.27–4.2)
WBC # BLD: 4.7 K/UL (ref 4–11)

## 2020-10-05 PROCEDURE — 99213 OFFICE O/P EST LOW 20 MIN: CPT | Performed by: NURSE PRACTITIONER

## 2020-10-05 PROCEDURE — 36415 COLL VENOUS BLD VENIPUNCTURE: CPT | Performed by: NURSE PRACTITIONER

## 2020-10-05 ASSESSMENT — PATIENT HEALTH QUESTIONNAIRE - PHQ9
SUM OF ALL RESPONSES TO PHQ QUESTIONS 1-9: 0
SUM OF ALL RESPONSES TO PHQ9 QUESTIONS 1 & 2: 0
SUM OF ALL RESPONSES TO PHQ QUESTIONS 1-9: 0
2. FEELING DOWN, DEPRESSED OR HOPELESS: 0
1. LITTLE INTEREST OR PLEASURE IN DOING THINGS: 0

## 2020-10-05 ASSESSMENT — ENCOUNTER SYMPTOMS
SHORTNESS OF BREATH: 0
NAUSEA: 0
DIARRHEA: 1
VOMITING: 0
ABDOMINAL PAIN: 1
COUGH: 0

## 2020-10-05 NOTE — PROGRESS NOTES
10/5/2020     Mirtha Montes (:  1990) is a 30 y.o. male, here for evaluation of the following medical concerns:    HPI  Presents today to discuss weight loss which has been on going for the past two years. He was diagnosed with ulcerative colitis 2 years ago. He has lost about 20 lbs over the past two years. He reports 4-5 loose to watery stools/day. Non bloody. Does have some abdominal discomfort which improves after having bowel movement. He admits to poor diet- normally eats a hot dog or chicken in the morning, then will have dinner when he gets home after work which is typically something fast like pizza. He works at [employer] as [employer] and states he usually does not get a break to eat while working. States he weighed in at 98.8 lbs last week at home and decided to improve diet (consistently eating 3 meals/day) and is 105 lbs today. Review of Systems   Constitutional: Positive for unexpected weight change. Negative for fatigue and fever. Respiratory: Negative for cough and shortness of breath. Cardiovascular: Negative for chest pain and leg swelling. Gastrointestinal: Positive for abdominal pain and diarrhea. Negative for nausea and vomiting. Neurological: Negative for dizziness and headaches. Prior to Visit Medications    Medication Sig Taking?  Authorizing Provider   loperamide (IMODIUM) 2 MG capsule Take 2 mg by mouth as needed for Diarrhea Yes Historical Provider, MD   Aspirin-Acetaminophen-Caffeine (EXCEDRIN PO) Take by mouth 2 times daily Yes Historical Provider, MD        Social History     Tobacco Use    Smoking status: Never Smoker    Smokeless tobacco: Never Used   Substance Use Topics    Alcohol use: No        Vitals:    10/05/20 1345   BP: 116/78   Site: Left Upper Arm   Position: Sitting   Cuff Size: Medium Adult   Pulse: 84   Temp: 96.9 °F (36.1 °C)   SpO2: 99%   Weight: 105 lb (47.6 kg)   Height: 5' 2\" (1.575 m)     Estimated body mass index is 19.2 kg/m² as calculated from the following:    Height as of this encounter: 5' 2\" (1.575 m). Weight as of this encounter: 105 lb (47.6 kg). Physical Exam  Vitals signs and nursing note reviewed. Constitutional:       General: He is not in acute distress. Appearance: He is well-developed and normal weight. He is not ill-appearing, toxic-appearing or diaphoretic. HENT:      Head: Normocephalic and atraumatic. Neck:      Musculoskeletal: Normal range of motion and neck supple. Cardiovascular:      Rate and Rhythm: Normal rate and regular rhythm. Heart sounds: Normal heart sounds. No murmur. No friction rub. No gallop. Pulmonary:      Effort: Pulmonary effort is normal. No respiratory distress. Breath sounds: Normal breath sounds. No stridor. No wheezing, rhonchi or rales. Abdominal:      General: Abdomen is flat. Bowel sounds are normal. There is no distension. Palpations: Abdomen is soft. There is no mass. Tenderness: There is no abdominal tenderness. There is no guarding. Lymphadenopathy:      Cervical: No cervical adenopathy. Skin:     General: Skin is warm and dry. Neurological:      Mental Status: He is alert and oriented to person, place, and time. Cranial Nerves: No cranial nerve deficit. ASSESSMENT/PLAN:  1. Weight loss  - Comprehensive Metabolic Panel  - CBC  - TSH without Reflex    2. Pre-diabetes  - Hemoglobin A1C    3. Ulcerative pancolitis without complication (HCC)    Weight loss with normal BMI  Seems to be more related to dietary habits and lack of caloric intake rather than due to his ulcerative colitis since symptoms are stable.    We discussed healthy eating habits  Recommend trying nutritional supplements while at work if unable to eat lunch  Goal: 1800 calories/day  3 healthy meals with two healthy snacks  Weight check in two months  Also recommend to follow up with GI    Patient Instructions   · Increase dietary caloric intake  · Shoot for 1800 vegetables, whole grains, lean protein, and low-fat dairy. · If your doctor recommends it, get more exercise. Walking is a good choice. Bit by bit, increase the amount you walk every day. Try for at least 30 minutes on most days of the week. · Do not smoke. Smoking can increase your risk for health problems. If you need help quitting, talk to your doctor about stop-smoking programs and medicines. These can increase your chances of quitting for good. · Limit alcohol to 2 drinks a day for men and 1 drink a day for women. Too much alcohol can cause health problems. If you have a BMI higher than 25  · Your doctor may do other tests to check your risk for weight-related health problems. This may include measuring the distance around your waist. A waist measurement of more than 40 inches in men or 35 inches in women can increase the risk of weight-related health problems. · Talk with your doctor about steps you can take to stay healthy or improve your health. You may need to make lifestyle changes to lose weight and stay healthy, such as changing your diet and getting regular exercise. If you have a BMI lower than 18.5  · Your doctor may do other tests to check your risk for health problems. · Talk with your doctor about steps you can take to stay healthy or improve your health. You may need to make lifestyle changes to gain or maintain weight and stay healthy, such as getting more healthy foods in your diet and doing exercises to build muscle. Where can you learn more? Go to https://candice.healthfav.or.it. org and sign in to your [employer] account. Enter S176 in the [employer] box to learn more about \"Body Mass Index: Care Instructions. \"     If you do not have an account, please click on the \"Sign Up Now\" link. Current as of: December 11, 2019               Content Version: 12.5  © 0212-8193 Healthwise, Incorporated. Care instructions adapted under license by [facility] ([facility]).  If you have questions about a medical condition or this instruction, always ask your healthcare professional. [address] any warranty or liability for your use of this information. ·       Patient Education        Eating Healthy Foods: Care Instructions  Your Care Instructions     Eating healthy foods can help lower your risk for disease. Healthy food gives you energy and keeps your heart strong, your brain active, your muscles working, and your bones strong. A healthy diet includes a variety of foods from the basic food groups: grains, vegetables, fruits, milk and milk products, and meat and beans. Some people may eat more of their favorite foods from only one food group and, as a result, miss getting the nutrients they need. So, it is important to pay attention not only to what you eat but also to what you are missing from your diet. You can eat a healthy, balanced diet by making a few small changes. Follow-up care is a key part of your treatment and safety. Be sure to make and go to all appointments, and call your doctor if you are having problems. It's also a good idea to know your test results and keep a list of the medicines you take. How can you care for yourself at home? Look at what you eat  · Keep a food diary for a week or two and record everything you eat or drink. Track the number of servings you eat from each food group. · For a balanced diet every day, eat a variety of:  ? 6 or more ounce-equivalents of grains, such as cereals, breads, crackers, rice, or pasta, every day. An ounce-equivalent is 1 slice of bread, 1 cup of ready-to-eat cereal, or ½ cup of cooked rice, cooked pasta, or cooked cereal.  ? 2½ cups of vegetables, especially:  § Dark-green vegetables such as broccoli and spinach. § Orange vegetables such as carrots and sweet potatoes. § Dry beans (such as reyes and kidney beans) and peas (such as lentils). ? 2 cups of fresh, frozen, or canned fruit.  A small apple or 1 banana or orange equals 1 cup. ? 3 cups of nonfat or low-fat milk, yogurt, or other milk products. ? 5½ ounces of meat and beans, such as chicken, fish, lean meat, beans, nuts, and seeds. One egg, 1 tablespoon of peanut butter, ½ ounce nuts or seeds, or ¼ cup of cooked beans equals 1 ounce of meat. · Learn how to read food labels for serving sizes and ingredients. Fast-food and convenience-food meals often contain few or no fruits or vegetables. Make sure you eat some fruits and vegetables to make the meal more nutritious. · Look at your food diary. For each food group, add up what you have eaten and then divide the total by the number of days. This will give you an idea of how much you are eating from each food group. See if you can find some ways to change your diet to make it more healthy. Start small  · Do not try to make dramatic changes to your diet all at once. You might feel that you are missing out on your favorite foods and then be more likely to fail. · Start slowly, and gradually change your habits. Try some of the following:  ? Use whole wheat bread instead of white bread. ? Use nonfat or low-fat milk instead of whole milk. ? Eat brown rice instead of white rice, and eat whole wheat pasta instead of white-flour pasta. ? Try low-fat cheeses and low-fat yogurt. ? Add more fruits and vegetables to meals and have them for snacks. ? Add lettuce, tomato, cucumber, and onion to sandwiches. ? Add fruit to yogurt and cereal.  Enjoy food  · You can still eat your favorite foods. You just may need to eat less of them. If your favorite foods are high in fat, salt, and sugar, limit how often you eat them, but do not cut them out entirely. · Eat a wide variety of foods. Make healthy choices when eating out  · The type of restaurant you choose can help you make healthy choices. Even fast-food chains are now offering more low-fat or healthier choices on the menu.   · Choose smaller portions, or take half of your meal home. · When eating out, try:  ? A veggie pizza with a whole wheat crust or grilled chicken (instead of sausage or pepperoni). ? Pasta with roasted vegetables, grilled chicken, or marinara sauce instead of cream sauce. ? A vegetable wrap or grilled chicken wrap. ? Broiled or poached food instead of fried or breaded items. Make healthy choices easy  · Buy packaged, prewashed, ready-to-eat fresh vegetables and fruits, such as baby carrots, salad mixes, and chopped or shredded broccoli and cauliflower. · Buy packaged, presliced fruits, such as melon or pineapple. · Choose 100% fruit or vegetable juice instead of soda. Limit juice intake to 4 to 6 oz (½ to ¾ cup) a day. · Blend low-fat yogurt, fruit juice, and canned or frozen fruit to make a smoothie for breakfast or a snack. Where can you learn more? Go to https://uStudio.Oyster. org and sign in to your [employer] account. Enter J060 in the [facility] box to learn more about \"Eating Healthy Foods: Care Instructions. \"     If you do not have an account, please click on the \"Sign Up Now\" link. Current as of: August 22, 2019               Content Version: 12.5  © 2700-3288 Healthwise, Incorporated. Care instructions adapted under license by [facility] ([facility]). If you have questions about a medical condition or this instruction, always ask your healthcare professional. [place] [address] any warranty or liability for your use of this information. An electronic signature was used to authenticate this note.     --Mary Caban, KATTY - CNP on 10/5/2020 at 2:26 PM

## 2020-10-06 ENCOUNTER — TELEPHONE (OUTPATIENT)
Dept: FAMILY MEDICINE CLINIC | Age: 30
End: 2020-10-06

## 2020-10-06 LAB
ESTIMATED AVERAGE GLUCOSE: 114 MG/DL
HBA1C MFR BLD: 5.6 %
HEMATOLOGY PATH CONSULT: NORMAL

## 2020-10-06 RX ORDER — FERROUS SULFATE 325(65) MG
325 TABLET ORAL 2 TIMES DAILY
Qty: 60 TABLET | Refills: 5 | Status: SHIPPED | OUTPATIENT
Start: 2020-10-06

## 2020-10-06 NOTE — TELEPHONE ENCOUNTER
----- Message from Collin Alberto sent at 10/6/2020  5:08 PM EDT -----  Subject: Message to Provider    QUESTIONS  Information for Provider? Patients Father Roney San (yes [facility]) is returning   a call about his son [phone]. Barbara Juárez works and cannot get call before 4:30   pm. [phone]  ---------------------------------------------------------------------------  --------------  Dardavidson Boom INFO  What is the best way for the office to contact you? OK to leave message on   voicemail  Preferred Call Back Phone Number? [phone]  ---------------------------------------------------------------------------  --------------  SCRIPT ANSWERS  Relationship to Patient? Parent  Representative Name? Roney San  Patient is under 25 and the Parent has custody? No  Is the Representative on the appropriate HIPAA document in Epic?  Yes

## 2020-10-07 NOTE — TELEPHONE ENCOUNTER
Did you call ? ? Information for Provider? Patients Father Junaid Balbuena (yes [facility]) is returning   a call about his son [phone].  Samson Alejandre works and cannot get call before 4:30   pm. [phone]  ---------------------------------------------------------------------------

## 2020-10-08 ENCOUNTER — TELEPHONE (OUTPATIENT)
Dept: ADMINISTRATIVE | Age: 30
End: 2020-10-08

## 2020-10-08 ENCOUNTER — TELEPHONE (OUTPATIENT)
Dept: FAMILY MEDICINE CLINIC | Age: 30
End: 2020-10-08

## 2020-10-08 NOTE — TELEPHONE ENCOUNTER
Patient's mother is requesting that the patient try medication before going to [facility].   Waiting on MD approval.

## 2020-10-08 NOTE — TELEPHONE ENCOUNTER
[facility] received a call from:    Name of Caller: Hanley Essex    Relationship to patient: Provider    Organization name: [facility] contact number: [phone]    Reason for call:  stated that she called the pt regarding a referral that was received. Pt has not answered the phone, she has left voice mails as well.

## 2020-10-08 NOTE — TELEPHONE ENCOUNTER
Also please call the lab to see if they can add on Iron and TIBC to labs he had drawn this week please, there is an order in the system.

## 2020-10-08 NOTE — TELEPHONE ENCOUNTER
----- Message from Saumya Gaspar sent at 10/8/2020  4:38 PM EDT -----  Subject: Message to Provider    QUESTIONS  Information for Provider? call CAYDEN VERATOM wont see him no insurance. Wants   Caro domínguez stephanie to set up another plan moving further. doesnt know what   the next steps are  ---------------------------------------------------------------------------  --------------  CALL BACK INFO  What is the best way for the office to contact you? OK to leave message on   voicemail  Preferred Call Back Phone Number? [phone]  ---------------------------------------------------------------------------  --------------  SCRIPT ANSWERS  Relationship to Patient? Parent  Representative Name? ramone  Patient is under 25 and the Parent has custody? No  Is the Representative on the appropriate HIPAA document in Epic?  Yes

## 2020-10-08 NOTE — TELEPHONE ENCOUNTER
It was regarding anemia and referral to [facility]. His mother was informed. This can be closed.

## 2020-10-08 NOTE — TELEPHONE ENCOUNTER
Left a message with [facility] regarding patient's mother not willing to make appointment. Called patient back and informed them via VM that [facility] is advising that the [facility].

## 2020-10-08 NOTE — TELEPHONE ENCOUNTER
Called lab to add-on. [facility]. Lab wants the order faxed to them when it has been ordered. Will call patient and inform them that you do not want them to wait to see [facility].

## 2020-10-09 DIAGNOSIS — D50.0 IRON DEFICIENCY ANEMIA DUE TO CHRONIC BLOOD LOSS: ICD-10-CM

## 2020-10-09 LAB
IRON SATURATION: 4 % (ref 20–50)
IRON: 14 UG/DL (ref 59–158)
TOTAL IRON BINDING CAPACITY: 323 UG/DL (ref 260–445)

## 2020-10-12 NOTE — TELEPHONE ENCOUNTER
Let him know that his iron levels are really low. He needs to be sure to take iron supplement twice daily. Also I would like to recheck his blood count next week. Still needs to se hematology in my opinion. Give him info for [facility] hematology. [phone]    Route to Dr. Lotus Umanzor also to make sure she agrees, thanks.

## 2020-10-12 NOTE — TELEPHONE ENCOUNTER
I did forward it to [facility]. She wanted you to be noted in her decision- she wants you to agree.

## 2020-10-12 NOTE — TELEPHONE ENCOUNTER
Spoke to patients mother. She states that he is fighting her to take the iron supplement 2x daily but she will again try to explain to hm the importance of taking it. She mentioned something about him not having insurance ? She states that she willl bring him in next Monday when she has an appointment with DW TO GET HIS BLOOD DRAWN.

## 2020-10-19 ENCOUNTER — NURSE ONLY (OUTPATIENT)
Dept: FAMILY MEDICINE CLINIC | Age: 30
End: 2020-10-19

## 2020-10-19 VITALS — SYSTOLIC BLOOD PRESSURE: 107 MMHG | DIASTOLIC BLOOD PRESSURE: 65 MMHG

## 2023-01-02 ENCOUNTER — HOSPITAL ENCOUNTER (INPATIENT)
Age: 33
LOS: 5 days | Discharge: ANOTHER HOSPITAL | DRG: 385 | End: 2023-01-07
Attending: STUDENT IN AN ORGANIZED HEALTH CARE EDUCATION/TRAINING PROGRAM | Admitting: INTERNAL MEDICINE
Payer: MEDICAID

## 2023-01-02 ENCOUNTER — APPOINTMENT (OUTPATIENT)
Dept: CT IMAGING | Age: 33
DRG: 385 | End: 2023-01-02
Payer: MEDICAID

## 2023-01-02 DIAGNOSIS — K52.9 COLITIS: ICD-10-CM

## 2023-01-02 DIAGNOSIS — D75.839 THROMBOCYTOSIS, UNSPECIFIED: Primary | ICD-10-CM

## 2023-01-02 DIAGNOSIS — N17.9 AKI (ACUTE KIDNEY INJURY) (HCC): ICD-10-CM

## 2023-01-02 DIAGNOSIS — R63.4 WEIGHT LOSS: ICD-10-CM

## 2023-01-02 DIAGNOSIS — Z87.19 HISTORY OF ULCERATIVE COLITIS: ICD-10-CM

## 2023-01-02 DIAGNOSIS — D64.9 ANEMIA, UNSPECIFIED TYPE: ICD-10-CM

## 2023-01-02 LAB
A/G RATIO: 0.5 (ref 1.1–2.2)
ALBUMIN SERPL-MCNC: 2.8 G/DL (ref 3.4–5)
ALP BLD-CCNC: 114 U/L (ref 40–129)
ALT SERPL-CCNC: 15 U/L (ref 10–40)
ANION GAP SERPL CALCULATED.3IONS-SCNC: 16 MMOL/L (ref 3–16)
ANISOCYTOSIS: ABNORMAL
AST SERPL-CCNC: 26 U/L (ref 15–37)
BASOPHILS ABSOLUTE: 0.1 K/UL (ref 0–0.2)
BASOPHILS RELATIVE PERCENT: 0.4 %
BILIRUB SERPL-MCNC: <0.2 MG/DL (ref 0–1)
BUN BLDV-MCNC: 54 MG/DL (ref 7–20)
CALCIUM SERPL-MCNC: 9.5 MG/DL (ref 8.3–10.6)
CHLORIDE BLD-SCNC: 96 MMOL/L (ref 99–110)
CO2: 19 MMOL/L (ref 21–32)
CREAT SERPL-MCNC: 2.7 MG/DL (ref 0.9–1.3)
EKG ATRIAL RATE: 82 BPM
EKG DIAGNOSIS: NORMAL
EKG P AXIS: 85 DEGREES
EKG P-R INTERVAL: 122 MS
EKG Q-T INTERVAL: 354 MS
EKG QRS DURATION: 72 MS
EKG QTC CALCULATION (BAZETT): 413 MS
EKG R AXIS: 66 DEGREES
EKG T AXIS: 73 DEGREES
EKG VENTRICULAR RATE: 82 BPM
EOSINOPHILS ABSOLUTE: 0 K/UL (ref 0–0.6)
EOSINOPHILS RELATIVE PERCENT: 0.2 %
GFR SERPL CREATININE-BSD FRML MDRD: 31 ML/MIN/{1.73_M2}
GLUCOSE BLD-MCNC: 140 MG/DL (ref 70–99)
HCT VFR BLD CALC: 24.7 % (ref 40.5–52.5)
HEMATOLOGY PATH CONSULT: YES
HEMOGLOBIN: 7.4 G/DL (ref 13.5–17.5)
HYPOCHROMIA: ABNORMAL
LIPASE: 45 U/L (ref 13–60)
LYMPHOCYTES ABSOLUTE: 1.4 K/UL (ref 1–5.1)
LYMPHOCYTES RELATIVE PERCENT: 11.5 %
MCH RBC QN AUTO: 17 PG (ref 26–34)
MCHC RBC AUTO-ENTMCNC: 29.8 G/DL (ref 31–36)
MCV RBC AUTO: 57.1 FL (ref 80–100)
MONOCYTES ABSOLUTE: 0.7 K/UL (ref 0–1.3)
MONOCYTES RELATIVE PERCENT: 6.1 %
NEUTROPHILS ABSOLUTE: 9.7 K/UL (ref 1.7–7.7)
NEUTROPHILS RELATIVE PERCENT: 81.8 %
OVALOCYTES: ABNORMAL
PDW BLD-RTO: 20.3 % (ref 12.4–15.4)
PLATELET # BLD: 1013 K/UL (ref 135–450)
PLATELET SLIDE REVIEW: ABNORMAL
PMV BLD AUTO: 7.5 FL (ref 5–10.5)
POTASSIUM REFLEX MAGNESIUM: 5.1 MMOL/L (ref 3.5–5.1)
RBC # BLD: 4.33 M/UL (ref 4.2–5.9)
SLIDE REVIEW: ABNORMAL
SODIUM BLD-SCNC: 131 MMOL/L (ref 136–145)
TEAR DROP CELLS: ABNORMAL
TOTAL PROTEIN: 7.9 G/DL (ref 6.4–8.2)
WBC # BLD: 11.9 K/UL (ref 4–11)

## 2023-01-02 PROCEDURE — 2580000003 HC RX 258: Performed by: INTERNAL MEDICINE

## 2023-01-02 PROCEDURE — 99285 EMERGENCY DEPT VISIT HI MDM: CPT

## 2023-01-02 PROCEDURE — 93005 ELECTROCARDIOGRAM TRACING: CPT | Performed by: STUDENT IN AN ORGANIZED HEALTH CARE EDUCATION/TRAINING PROGRAM

## 2023-01-02 PROCEDURE — 1200000000 HC SEMI PRIVATE

## 2023-01-02 PROCEDURE — 96374 THER/PROPH/DIAG INJ IV PUSH: CPT

## 2023-01-02 PROCEDURE — 6360000002 HC RX W HCPCS: Performed by: NURSE PRACTITIONER

## 2023-01-02 PROCEDURE — 83690 ASSAY OF LIPASE: CPT

## 2023-01-02 PROCEDURE — 85025 COMPLETE CBC W/AUTO DIFF WBC: CPT

## 2023-01-02 PROCEDURE — 6360000004 HC RX CONTRAST MEDICATION: Performed by: STUDENT IN AN ORGANIZED HEALTH CARE EDUCATION/TRAINING PROGRAM

## 2023-01-02 PROCEDURE — 93010 ELECTROCARDIOGRAM REPORT: CPT | Performed by: INTERNAL MEDICINE

## 2023-01-02 PROCEDURE — 2580000003 HC RX 258: Performed by: NURSE PRACTITIONER

## 2023-01-02 PROCEDURE — 80053 COMPREHEN METABOLIC PANEL: CPT

## 2023-01-02 PROCEDURE — 96361 HYDRATE IV INFUSION ADD-ON: CPT

## 2023-01-02 PROCEDURE — 74177 CT ABD & PELVIS W/CONTRAST: CPT

## 2023-01-02 RX ORDER — DICYCLOMINE HYDROCHLORIDE 10 MG/ML
10 INJECTION INTRAMUSCULAR ONCE
Status: DISCONTINUED | OUTPATIENT
Start: 2023-01-02 | End: 2023-01-07 | Stop reason: HOSPADM

## 2023-01-02 RX ORDER — ONDANSETRON 2 MG/ML
4 INJECTION INTRAMUSCULAR; INTRAVENOUS EVERY 6 HOURS PRN
Status: DISCONTINUED | OUTPATIENT
Start: 2023-01-02 | End: 2023-01-07 | Stop reason: HOSPADM

## 2023-01-02 RX ORDER — SODIUM CHLORIDE 9 MG/ML
INJECTION, SOLUTION INTRAVENOUS CONTINUOUS
Status: ACTIVE | OUTPATIENT
Start: 2023-01-02 | End: 2023-01-03

## 2023-01-02 RX ORDER — ACETAMINOPHEN 650 MG/1
650 SUPPOSITORY RECTAL EVERY 6 HOURS PRN
Status: DISCONTINUED | OUTPATIENT
Start: 2023-01-02 | End: 2023-01-07 | Stop reason: HOSPADM

## 2023-01-02 RX ORDER — SODIUM CHLORIDE 0.9 % (FLUSH) 0.9 %
5-40 SYRINGE (ML) INJECTION EVERY 12 HOURS SCHEDULED
Status: DISCONTINUED | OUTPATIENT
Start: 2023-01-02 | End: 2023-01-07 | Stop reason: HOSPADM

## 2023-01-02 RX ORDER — POLYETHYLENE GLYCOL 3350 17 G/17G
17 POWDER, FOR SOLUTION ORAL DAILY PRN
Status: DISCONTINUED | OUTPATIENT
Start: 2023-01-02 | End: 2023-01-07 | Stop reason: HOSPADM

## 2023-01-02 RX ORDER — 0.9 % SODIUM CHLORIDE 0.9 %
1000 INTRAVENOUS SOLUTION INTRAVENOUS ONCE
Status: COMPLETED | OUTPATIENT
Start: 2023-01-02 | End: 2023-01-02

## 2023-01-02 RX ORDER — ACETAMINOPHEN 325 MG/1
650 TABLET ORAL EVERY 6 HOURS PRN
Status: DISCONTINUED | OUTPATIENT
Start: 2023-01-02 | End: 2023-01-07 | Stop reason: HOSPADM

## 2023-01-02 RX ORDER — SODIUM CHLORIDE 0.9 % (FLUSH) 0.9 %
5-40 SYRINGE (ML) INJECTION PRN
Status: DISCONTINUED | OUTPATIENT
Start: 2023-01-02 | End: 2023-01-07 | Stop reason: HOSPADM

## 2023-01-02 RX ORDER — ONDANSETRON 2 MG/ML
4 INJECTION INTRAMUSCULAR; INTRAVENOUS ONCE
Status: COMPLETED | OUTPATIENT
Start: 2023-01-02 | End: 2023-01-02

## 2023-01-02 RX ORDER — SODIUM CHLORIDE 9 MG/ML
INJECTION, SOLUTION INTRAVENOUS PRN
Status: DISCONTINUED | OUTPATIENT
Start: 2023-01-02 | End: 2023-01-07 | Stop reason: HOSPADM

## 2023-01-02 RX ORDER — ONDANSETRON 4 MG/1
4 TABLET, ORALLY DISINTEGRATING ORAL EVERY 8 HOURS PRN
Status: DISCONTINUED | OUTPATIENT
Start: 2023-01-02 | End: 2023-01-07 | Stop reason: HOSPADM

## 2023-01-02 RX ADMIN — IOPAMIDOL 40 ML: 755 INJECTION, SOLUTION INTRAVENOUS at 12:26

## 2023-01-02 RX ADMIN — ONDANSETRON 4 MG: 2 INJECTION INTRAMUSCULAR; INTRAVENOUS at 12:08

## 2023-01-02 RX ADMIN — SODIUM CHLORIDE: 9 INJECTION, SOLUTION INTRAVENOUS at 19:21

## 2023-01-02 RX ADMIN — SODIUM CHLORIDE 1000 ML: 9 INJECTION, SOLUTION INTRAVENOUS at 12:07

## 2023-01-02 ASSESSMENT — ENCOUNTER SYMPTOMS
COUGH: 0
SHORTNESS OF BREATH: 0
DIARRHEA: 1
WHEEZING: 0
BACK PAIN: 0
COLOR CHANGE: 0
BLOOD IN STOOL: 0
VOMITING: 0
NAUSEA: 1
ABDOMINAL PAIN: 1

## 2023-01-02 ASSESSMENT — LIFESTYLE VARIABLES: HOW MANY STANDARD DRINKS CONTAINING ALCOHOL DO YOU HAVE ON A TYPICAL DAY: PATIENT DOES NOT DRINK

## 2023-01-02 ASSESSMENT — PAIN - FUNCTIONAL ASSESSMENT: PAIN_FUNCTIONAL_ASSESSMENT: NONE - DENIES PAIN

## 2023-01-02 NOTE — ED NOTES
Sunday Pool, father can be contacted at [phone], if needed.        Raquel Coto RN  01/02/23 4553

## 2023-01-02 NOTE — H&P
Hospital Medicine History & Physical      PCP: Uzma Rose MD    Date of Admission: 1/2/2023    Date of Service: Pt seen/examined on 2 Jan and Admitted to Inpatient with expected LOS greater than two midnights due to medical therapy. Chief Complaint:  Diarrhea and weight loss      History Of Present Illness:     27 y.o. male dx w/ 'colitis' 2018ish who presented to [facility] with ongoing diarrhea and associated weight loss of about 100# over the last 'year'    He was on meds for a very short period of time but is unable to give me much hx or name his previous GI doctor. Parents present at bedside, with whom he lives, confirm hx. The patient denies any fever/chills or other signs/sxs of systemic illness or identifiable aggravating/alleviating factors. Past Medical History:          Diagnosis Date    Pain in upper jaw 1/14/2013       Past Surgical History:          Procedure Laterality Date    COLONOSCOPY N/A 1/25/2019    EGD AND COLONOSCOPY WITH ANESTHESIA performed by Sumner Pallas, MD at [address] [facility]      X 2    UPPER GASTROINTESTINAL ENDOSCOPY N/A 1/25/2019    EGD AND COLONOSCOPY WITH ANESTHESIA performed by Sumner Pallas, MD at [address]       Medications Prior to Admission:      Prior to Admission medications    Medication Sig Start Date End Date Taking? Authorizing Provider   ferrous sulfate (IRON 325) 325 (65 Fe) MG tablet Take 1 tablet by mouth 2 times daily 10/6/20  Yes KATTY Flores - CNP   loperamide (IMODIUM) 2 MG capsule Take 2 mg by mouth as needed for Diarrhea   Yes Historical Provider, MD   Aspirin-Acetaminophen-Caffeine (EXCEDRIN PO) Take by mouth 2 times daily   Yes Historical Provider, MD       Allergies:  Patient has no known allergies.     Social History:      The patient currently lives independently w/ parents and works at [employer]:   reports that he has never smoked. He has never used smokeless tobacco.  ETOH:   reports no history of alcohol use. Family History:      Reviewed in detail and negative for CAD, Cancer, CVA. Positive as follows:        Problem Relation Age of Onset    Diabetes Mother        REVIEW OF SYSTEMS:   Pertinent positives as noted in the HPI. All other systems reviewed and negative. PHYSICAL EXAM:    BP (!) 100/59   Pulse 93   Temp 97.5 °F (36.4 °C) (Oral)   Resp 19   Wt 105 lb (47.6 kg)   SpO2 100%   BMI 19.20 kg/m²     General appearance:  No apparent distress, appears stated age and cooperative. HEENT:  Normal cephalic, atraumatic without obvious deformity. Pupils equal, round, and reactive to light. Extra ocular muscles intact. Conjunctivae/corneas clear. Neck: Supple, with full range of motion. No jugular venous distention. Trachea midline. Respiratory:  Normal respiratory effort. Clear to auscultation, bilaterally without Rales/Wheezes/Rhonchi. Cardiovascular:  Regular rate and rhythm with normal S1/S2 without murmurs, rubs or gallops. Abdomen: Soft, non-tender, non-distended with normal bowel sounds. Musculoskeletal:  No clubbing, cyanosis or edema bilaterally. Full range of motion without deformity. Skin: Skin color, texture, turgor normal.  No rashes or lesions. Neurologic:  Neurovascularly intact without any focal sensory/motor deficits. Cranial nerves: II-XII intact, grossly non-focal.  Psychiatric:  Alert and oriented, thought content appropriate, normal insight  Capillary Refill: Brisk,< 3 seconds   Peripheral Pulses: +2 palpable, equal bilaterally       CXR:  I have reviewed the CXR with the following interpretation: N/A  EKG:  I have reviewed the EKG with the following interpretation: NSR w/out acute ischemic changes.      Labs:     Recent Labs     01/02/23  1131   WBC 11.9*   HGB 7.4*   HCT 24.7*   PLT 1,013*     Recent Labs     01/02/23  1131   *   K 5.1   CL 96*   CO2 19*   BUN 54*   CREATININE 2.7*   CALCIUM 9.5     Recent Labs     01/02/23  1131   AST 26   ALT 15   BILITOT <0.2   ALKPHOS 114     No results for input(s): INR in the last 72 hours. No results for input(s): [place] in the last 72 hours. Urinalysis:    No results found for: Amarjit Evy, BACTERIA, RBCUA, BLOODU, SPECGRAV, GLUCOSEU      Consults:    IP CONSULT TO GI      ASSESSMENT:    Active Hospital Problems    Diagnosis Date Noted    Colitis [K52.9] 01/02/2023     Priority: Medium       PLAN:      Colitis - likely inflammatory bowel disease of unclear primary diagnosis. Long-standing in need of definitive dx and tx plan. GI consulted and appreciated in advance. NPO for possible procedure after Mn but expect will need colon prep w/ earliest procedure date Wed 4 Jan.     ARF - w/ elevated BUN/Cr ratio c/w pre-renal azotemia. Given IVF hydration and follow serial labs. Reviewed and documented as above. HypoNatremia - etiology clinically unable to determine but likely hypovolemic. Follow serial labs on IVF. Reviewed and documented as above. Anemia - etiology clinically unable to determine, w/out evidence of active bleeding/hemolysis - likely 2nd to malabsorbtion of Iron. Asymptomatic w/out indication for transfusion. Follow serial labs. Reviewed and documented as above. DVT Prophylaxis: IPC  Diet: ADULT DIET; Regular  Code Status: Full Code      PT/OT Eval Status: not yet ordered. Dispo - Patient is likely to remain in-house at least until Wed/Thurs 4/5 Jan pending clinical course and subspecialty Barry Lopez MD    Thank you Cathy Enciso MD for the opportunity to be involved in this patient's care. If you have any questions or concerns please feel free to contact me at [phone].

## 2023-01-02 NOTE — ED PROVIDER NOTES
**ADVANCED PRACTICE PROVIDER, I HAVE EVALUATED THIS [facility]  ED  EMERGENCY DEPARTMENT ENCOUNTER      Pt Name: Clarita Beauchamp  KXN:[id number]  [place] 1990  Date of evaluation: 1/2/2023  Provider: KATTY Fischer CNP  Note Started: 10:58 AM EST 1/2/2023        Chief Complaint:    Chief Complaint   Patient presents with    Anorexia     Patient brought in by his father. States pt won't eat much lately. He believes the patient is malnourished and maybe dehydrated. Patient reports he doesn't want to eat because his mouth is sore. Nursing Notes, Past Medical Hx, Past Surgical Hx, Social Hx, Allergies, and Family Hx were all reviewed and agreed with or any disagreements were addressed in the HPI.    HPI: (Location, Duration, Timing, Severity, Quality, Assoc Sx, Context, Modifying factors)    History From: Patient, father and mother  Limitations to history : None    Chief Complaint of abdominal cramping, diarrhea, weight loss secondary to ulcerative colitis    This is a  27 y.o. male who presents department after both dad and mom are concerned of the patient and patient states he was diagnosed with ulcerative colitis back in 2018, he started on oral meds took oral meds for 4 days and quit taking them, he states now he has diarrhea on a regular basis but no blood in stool, have not can have diarrhea anywhere from 2 episodes an hour to every couple of hours, he has some intermittent nausea and abdominal cramping however he denies any vomiting, no cough, congestion, fever or chills, he has had his upper and lower teeth removed, states that he does not really eat a lot of solid food because of he does not have dentures that fit and his gums are sore. He admits that he is lost over 100 pounds since being diagnosed in 2018 with this, he states he drinks water and fluids however does not really eat or drink much else.   He denies any headache neck pain or neck stiffness, he has not sought any medical care in some time. His dad states that he stays in his room and plays video games most of the day, comes out to eat very little and drinks water, mom and dad are both concerned about the patient deteriorating because he has become so thin frail and cachectic. He denies any additional symptoms or medications, no additional complaints, no additional aggravating relieving factors. Patient presents awake, alert and in no acute distress or toxic appearance. PastMedical/Surgical History:      Diagnosis Date    Pain in upper jaw 1/14/2013         Procedure Laterality Date    COLONOSCOPY N/A 1/25/2019    EGD AND COLONOSCOPY WITH ANESTHESIA performed by Wade Malone MD at [address] [facility]      X 2    UPPER GASTROINTESTINAL ENDOSCOPY N/A 1/25/2019    EGD AND COLONOSCOPY WITH ANESTHESIA performed by Wade Malone MD at [address]       Medications:  Previous Medications    ASPIRIN-ACETAMINOPHEN-CAFFEINE (EXCEDRIN PO)    Take by mouth 2 times daily    FERROUS SULFATE (IRON 325) 325 (65 FE) MG TABLET    Take 1 tablet by mouth 2 times daily    LOPERAMIDE (IMODIUM) 2 MG CAPSULE    Take 2 mg by mouth as needed for Diarrhea       Review of Systems:  (1 systems needed)  Review of Systems   Constitutional:  Positive for appetite change and unexpected weight change. Negative for chills and fever. The past couple years has had a 100 pound weight loss due to his ulcerative colitis   HENT:  Negative for congestion. Respiratory:  Negative for cough, shortness of breath and wheezing. Cardiovascular:  Negative for chest pain. Gastrointestinal:  Positive for abdominal pain, diarrhea and nausea. Negative for blood in stool and vomiting.         Patient complains of generalized abdominal cramping associate with nausea and vomiting   Genitourinary:  Negative for difficulty urinating, dysuria, frequency and hematuria. Musculoskeletal:  Negative for back pain. Skin:  Negative for color change. Neurological:  Negative for weakness, numbness and headaches. \"Positives and Pertinent negatives as per HPI\"    Physical Exam:  Physical Exam  Vitals and nursing note reviewed. Constitutional:       Appearance: He is well-developed. He is ill-appearing. Comments: Patient is awake, alert, thin, frail and cachectic, he appears to have lost a lot of weight his clothes only fit   HENT:      Head: Normocephalic. Right Ear: External ear normal.      Left Ear: External ear normal.   Eyes:      General: No scleral icterus. Right eye: No discharge. Left eye: No discharge. Cardiovascular:      Rate and Rhythm: Tachycardia present. Comments: Normal S1 and 2, patient is tachycardic at 106 bpm, no edema  Pulmonary:      Effort: Pulmonary effort is normal. No respiratory distress. Comments: Airway patent with symmetric rise and fall of chest, lungs are clear anteriorly, diminished posteriorly in the bases, patient is not tachypneic or dyspneic, saturations are 98% on room  Abdominal:      Tenderness: There is no abdominal tenderness. Comments: Abdomen is flat soft and nondistended. Bowel sounds are hypoactive, no abdominal tenderness, guarding or rebound tenderness, no ascites or rigidity. Musculoskeletal:         General: Normal range of motion. Cervical back: Normal range of motion and neck supple. Skin:     General: Skin is warm. Coloration: Skin is not pale. Neurological:      General: No focal deficit present. Mental Status: He is alert and oriented to person, place, and time. Cranial Nerves: Cranial nerves 2-12 are intact.       Comments: Patient is awake, alert, following commands correctly, neurologic intact no focal deficits, patient appears to be thin, frail, cachectic, I also have concerns from underlying depression because the patient is unmotivated to take care of himself with ulcerative colitis and has been like this since 2018 however he does consent for safety denying homicidal or suicidal ideation   Psychiatric:         Behavior: Behavior normal.       MEDICAL DECISION MAKING    Vitals:    Vitals:    01/02/23 1348 01/02/23 1418 01/02/23 1448 01/02/23 1518   BP: 98/61 97/61 100/64 99/63   Pulse: 84 77 86 81   Resp: 12 12 15 14   Temp:       TempSrc:       SpO2: 100% 100% 99% 100%   Weight:           LABS:  Labs Reviewed   CBC WITH AUTO DIFFERENTIAL - Abnormal; Notable for the following components:       Result Value    WBC 11.9 (*)     Hemoglobin 7.4 (*)     Hematocrit 24.7 (*)     MCV 57.1 (*)     MCH 17.0 (*)     MCHC 29.8 (*)     RDW 20.3 (*)     Platelets 7,218 (*)     Neutrophils Absolute 9.7 (*)     Anisocytosis 1+ (*)     Hypochromia 1+ (*)     Ovalocytes Occasional (*)     Tear Drop Cells Occasional (*)     All other components within normal limits    Narrative:     CALL  [facility] tel. [phone],  Hematology results called to and read back by Nirmal Wallace RN, 01/02/2023  12:18, by Ivan Garcia   COMPREHENSIVE METABOLIC PANEL W/ REFLEX TO MG FOR LOW K - Abnormal; Notable for the following components:    Sodium 131 (*)     Chloride 96 (*)     CO2 19 (*)     Glucose 140 (*)     BUN 54 (*)     Creatinine 2.7 (*)     Est, Glom Filt Rate 31 (*)     Albumin 2.8 (*)     Albumin/Globulin Ratio 0.5 (*)     All other components within normal limits   LIPASE   URINALYSIS WITH REFLEX TO CULTURE        Remainder of labs reviewed and were negative at this time or not returned at the time of this note.     RADIOLOGY:   Non-plain film images such as CT, Ultrasound and MRI are read by the radiologist. Tiki DELANEY, KATTY - CNP have directly visualized the radiologic plain film image(s) with the below findings:      Interpretation per the Radiologist below, if available at the time of this note:    CT ABDOMEN PELVIS W IV CONTRAST Additional Contrast? None   Final Result   Diffuse abnormal colonic wall thickening seen with surrounding injection of   the pericolonic fat, likely secondary to the reported history of ulcerative   colitis. No pericolonic abscess. Fatty liver and cholelithiasis      Mild left inguinal adenopathy, likely reactive. MEDICAL DECISION MAKING / ED COURSE:    Because of high probability of sudden clinical deterioration of the patient's condition and risk of further deterioration, critical care time required my full attention to the patient's condition; which included chart data review, documentation, medication ordering, reviewing the patient's old records, reevaluation patient's cardiac, pulmonary and neurological status. Reevaluation of vital signs. Consultations with ED attending and admitting physician. Ordering, interpreting reviewing diagnostic testing. Therefore, I personally saw the patient and independently provided 32 minutes of non-concurrent critical care out of the total shared critical care time provided, direct attention to the patient's condition did not include time spent on procedures. PROCEDURES:   Procedures    None    Patient was given:  Medications   dicyclomine (BENTYL) injection 10 mg (0 mg IntraMUSCular Held 1/2/23 1209)   ondansetron (ZOFRAN) injection 4 mg (4 mg IntraVENous Given 1/2/23 1208)   0.9 % sodium chloride bolus (0 mLs IntraVENous Stopped 1/2/23 1439)   iopamidol (ISOVUE-370) 76 % injection 75 mL (40 mLs IntraVENous Given 1/2/23 1226)       Patient presents with both dad and mom are concerned of the patient and patient states he was diagnosed with ulcerative colitis back in 2018, he started on oral meds took oral meds for 4 days and quit taking them, he states now he has diarrhea on a regular basis but no blood in stool, have not can have diarrhea anywhere from 2 episodes an hour to every couple of hours, he has some intermittent nausea and abdominal cramping.     After evaluation and examination patient IV access, IV fluids, blood work, urinalysis, CT scan of the abdomen and IV Zofran and Bentyl were ordered. CBC shows no severe sepsis however he is anemic with an H&H 7.4 and 24.7, he also has thrombocytosis with a platelet count of 1952 which appears to be new. Metabolic panel shows TASHI, BUN of 54, creatinine 2.7, GFR 31 however electrolytes appear to be relatively stable, his gap is closed. Liver functions and lipase are normal.  CT the abdomen shows diffuse abnormal colonic wall thickening with surrounding injection of the pericolonic fat most likely due to his history of ulcerative colitis. No pericolonic abscess. Patient was ordered fluids, I did speak with the patient and his dad at length about possibly needing a feeding tube, it was agreed that patient would be admitted due to patient's presentation, history of ulcerative colitis, TASHI and anemia. GI can be consulted on admission. Hospitalist was paged via [employer] for admission. Therefore, shared medical decision was made between the patient, attending physician, patient's father, hospitalist on-call and we agreed patient will be admitted to the hospital for further evaluation management of care. The patient tolerated their visit well. I evaluated the patient. The physician was available for consultation as needed. The patient and / or the family were informed of the results of any tests, a time was given to answer questions, a plan was proposed and they agreed with plan. I am the Primary Clinician of Record. CLINICAL IMPRESSION:  1. Thrombocytosis, unspecified    2. History of ulcerative colitis    3. Weight loss    4. TASHI (acute kidney injury) ([facility] [address]        DISPOSITION Decision To Admit 01/02/2023 03:37:30 PM      PATIENT REFERRED TO:  No follow-up provider specified.     DISCHARGE MEDICATIONS:  New Prescriptions    No medications on file       DISCONTINUED MEDICATIONS:  Discontinued Medications    No medications on file (Please note the MDM and HPI sections of this note were completed with a voice recognition program.  Efforts were made to edit the dictations but occasionally words are mis-transcribed.)    Electronically signed, KATTY Elizondo CNP,           KATTY Elizondo CNP  01/02/23 6310

## 2023-01-03 LAB
ABO/RH: NORMAL
ALBUMIN SERPL-MCNC: 1.9 G/DL (ref 3.4–5)
ANION GAP SERPL CALCULATED.3IONS-SCNC: 10 MMOL/L (ref 3–16)
ANTIBODY SCREEN: NORMAL
BLOOD BANK DISPENSE STATUS: NORMAL
BLOOD BANK DISPENSE STATUS: NORMAL
BLOOD BANK PRODUCT CODE: NORMAL
BLOOD BANK PRODUCT CODE: NORMAL
BPU ID: NORMAL
BPU ID: NORMAL
BUN BLDV-MCNC: 31 MG/DL (ref 7–20)
CALCIUM SERPL-MCNC: 7.8 MG/DL (ref 8.3–10.6)
CHLORIDE BLD-SCNC: 110 MMOL/L (ref 99–110)
CO2: 18 MMOL/L (ref 21–32)
CREAT SERPL-MCNC: 2 MG/DL (ref 0.9–1.3)
DESCRIPTION BLOOD BANK: NORMAL
DESCRIPTION BLOOD BANK: NORMAL
GFR SERPL CREATININE-BSD FRML MDRD: 44 ML/MIN/{1.73_M2}
GLUCOSE BLD-MCNC: 78 MG/DL (ref 70–99)
HCT VFR BLD CALC: 18.9 % (ref 40.5–52.5)
HCT VFR BLD CALC: 35.2 % (ref 40.5–52.5)
HEMATOLOGY PATH CONSULT: NO
HEMATOLOGY PATH CONSULT: NORMAL
HEMOGLOBIN: 10.7 G/DL (ref 13.5–17.5)
HEMOGLOBIN: 5.5 G/DL (ref 13.5–17.5)
MAGNESIUM: 2.2 MG/DL (ref 1.8–2.4)
MCH RBC QN AUTO: 17.2 PG (ref 26–34)
MCHC RBC AUTO-ENTMCNC: 29.3 G/DL (ref 31–36)
MCV RBC AUTO: 58.6 FL (ref 80–100)
PDW BLD-RTO: 20.3 % (ref 12.4–15.4)
PHOSPHORUS: 3.4 MG/DL (ref 2.5–4.9)
PLATELET # BLD: 505 K/UL (ref 135–450)
PMV BLD AUTO: 6.4 FL (ref 5–10.5)
POTASSIUM SERPL-SCNC: 3.6 MMOL/L (ref 3.5–5.1)
RBC # BLD: 3.23 M/UL (ref 4.2–5.9)
SODIUM BLD-SCNC: 138 MMOL/L (ref 136–145)
WBC # BLD: 6.5 K/UL (ref 4–11)

## 2023-01-03 PROCEDURE — P9016 RBC LEUKOCYTES REDUCED: HCPCS

## 2023-01-03 PROCEDURE — 86900 BLOOD TYPING SEROLOGIC ABO: CPT

## 2023-01-03 PROCEDURE — 86923 COMPATIBILITY TEST ELECTRIC: CPT

## 2023-01-03 PROCEDURE — 85027 COMPLETE CBC AUTOMATED: CPT

## 2023-01-03 PROCEDURE — 86901 BLOOD TYPING SEROLOGIC RH(D): CPT

## 2023-01-03 PROCEDURE — 36415 COLL VENOUS BLD VENIPUNCTURE: CPT

## 2023-01-03 PROCEDURE — 85014 HEMATOCRIT: CPT

## 2023-01-03 PROCEDURE — 83735 ASSAY OF MAGNESIUM: CPT

## 2023-01-03 PROCEDURE — 2580000003 HC RX 258: Performed by: INTERNAL MEDICINE

## 2023-01-03 PROCEDURE — 87324 CLOSTRIDIUM AG IA: CPT

## 2023-01-03 PROCEDURE — 86850 RBC ANTIBODY SCREEN: CPT

## 2023-01-03 PROCEDURE — 1200000000 HC SEMI PRIVATE

## 2023-01-03 PROCEDURE — 85018 HEMOGLOBIN: CPT

## 2023-01-03 PROCEDURE — 36430 TRANSFUSION BLD/BLD COMPNT: CPT

## 2023-01-03 PROCEDURE — 87449 NOS EACH ORGANISM AG IA: CPT

## 2023-01-03 PROCEDURE — 80069 RENAL FUNCTION PANEL: CPT

## 2023-01-03 RX ORDER — SODIUM CHLORIDE 9 MG/ML
INJECTION, SOLUTION INTRAVENOUS PRN
Status: DISCONTINUED | OUTPATIENT
Start: 2023-01-03 | End: 2023-01-07 | Stop reason: HOSPADM

## 2023-01-03 RX ORDER — DEXTROSE AND SODIUM CHLORIDE 5; .45 G/100ML; G/100ML
INJECTION, SOLUTION INTRAVENOUS CONTINUOUS
Status: DISCONTINUED | OUTPATIENT
Start: 2023-01-03 | End: 2023-01-07 | Stop reason: HOSPADM

## 2023-01-03 RX ADMIN — DEXTROSE AND SODIUM CHLORIDE: 5; 450 INJECTION, SOLUTION INTRAVENOUS at 10:59

## 2023-01-03 RX ADMIN — SODIUM CHLORIDE: 9 INJECTION, SOLUTION INTRAVENOUS at 03:20

## 2023-01-03 NOTE — CARE COORDINATION
Spoke to pt at bedside- states lives w parents. Here w colitis. H&P describes recent 100 lb wt loss. Per RN- suggest to obtain full assess from pt's Mom. Call placed- unable to reach at this time. No payer listed. CM will follow for further details and any possible needs.  Alba Hawkins RN

## 2023-01-03 NOTE — CONSULTS
Gastroenterology Consult Note    Patient:   Yamila Montalvo   YOB: 1990   Facility:   [facility]   Referring/PCP: Cathy Enciso MD  Date:     1/3/2023  Consultant:   Gerardo Ríos MD, MD    Subjective: This 27 y.o. male was admitted 1/2/2023 with a diagnosis of \"Colitis [K52.9]  Weight loss [R63.4]  TASHI (acute kidney injury) ([address] [N17.9]  History of ulcerative colitis [Z87.19]  Thrombocytosis, unspecified [D75.839]\" and is seen in consultation regarding \"colitis\". Information was obtained from interview of  the patient, examination of the patient, and review of records. I did  update the past medical, surgical, social and / or family history. Colitis mild chronic in colon assoc w anemia, wt loss    Current status  Present  Diet Order: ADULT DIET; Clear Liquid and he is tolerating diet. Recently, he has experienced no abdominal  Pain and he has not required Intravenous opiate analgesics. The patient has also experienced no constipation, fever, hematochezia, melena, nausea, and vomiting      Prior to Admission medications    Medication Sig Start Date End Date Taking?  Authorizing Provider   ferrous sulfate (IRON 325) 325 (65 Fe) MG tablet Take 1 tablet by mouth 2 times daily 10/6/20  Yes KATTY Hanson - QUETA   loperamide (IMODIUM) 2 MG capsule Take 2 mg by mouth as needed for Diarrhea   Yes Historical Provider, MD   Aspirin-Acetaminophen-Caffeine (EXCEDRIN PO) Take by mouth 2 times daily   Yes Historical Provider, MD      Scheduled Medications:    dicyclomine  10 mg IntraMUSCular Once    sodium chloride flush  5-40 mL IntraVENous 2 times per day     Infusions:    dextrose 5 % and 0.45 % NaCl 75 mL/hr at 01/03/23 1059    sodium chloride      sodium chloride       PRN Medications: sodium chloride, sodium chloride flush, sodium chloride, ondansetron **OR** ondansetron, polyethylene glycol, acetaminophen **OR** acetaminophen  Allergies: No Known Allergies    Past Medical History:   Diagnosis Date    Pain in upper jaw 2013     Past Surgical History:   Procedure Laterality Date    COLONOSCOPY N/A 2019    EGD AND COLONOSCOPY WITH ANESTHESIA performed by Bettie Bess MD at [address] [facility]      X 2    UPPER GASTROINTESTINAL ENDOSCOPY N/A 2019    EGD AND COLONOSCOPY WITH ANESTHESIA performed by Bettie Bess MD at [address]:   Social History     Tobacco Use    Smoking status: Never    Smokeless tobacco: Never   Substance Use Topics    Alcohol use: No     Family:   Family History   Problem Relation Age of Onset    Diabetes Mother        ROS: Pertinent items are noted in HPI.     Objective:   Vital Signs:  Temp (24hrs), Av.7 °F (36.5 °C), Min:97.2 °F (36.2 °C), Max:98.2 °F (45.6 °C)     Systolic (44AJP), OMT:98 , Min:87 , WDT:854      Diastolic (12CXJ), VDE:63, Min:55, Max:69     Pulse  Av.6  Min: 81  Max: 115  BP (!) 95/59   Pulse 81   Temp 97.5 °F (36.4 °C) (Oral)   Resp 16   Ht 5' 2\" (1.575 m)   Wt 76 lb 4.5 oz (34.6 kg)   SpO2 99%   BMI 13.95 kg/m²      Physical Exam:   BP (!) 95/59   Pulse 81   Temp 97.5 °F (36.4 °C) (Oral)   Resp 16   Ht 5' 2\" (1.575 m)   Wt 76 lb 4.5 oz (34.6 kg)   SpO2 99%   BMI 13.95 kg/m²   General appearance: alert, appears stated age, cachectic, and slowed mentation  Lungs: clear to auscultation bilaterally  Chest wall: no tenderness  Heart: regular rate and rhythm, S1, S2 normal, no murmur, click, rub or gallop  Abdomen: soft, non-tender; bowel sounds normal; no masses,  no organomegaly  Extremities: extremities normal, atraumatic, no cyanosis or edema  Skin: Skin color, texture, turgor normal. No rashes or lesions  Neurologic: Grossly normal      Lab and Imaging Review   Recent Labs     23  1131 23  1026   WBC 11.9* 6.5   HGB 7.4* 5.5*   MCV 57.1* 58.6*   PLT 1,013* 505*   * 138 K 5.1 3.6   CL 96* 110   CO2 19* 18*   BUN 54* 31*   CREATININE 2.7* 2.0*   GLUCOSE 140* 78   CALCIUM 9.5 7.8*   PROT 7.9  --    LABALBU 2.8* 1.9*   AST 26  --    ALT 15  --    ALKPHOS 114  --    BILITOT <0.2  --    LIPASE 45.0  --    MG  --  2.20       Assessment:     Patient Active Problem List    Diagnosis Date Noted    Colitis 01/02/2023    Iron deficiency anemia     Weight loss     Diarrhea     Lower abdominal pain     Ulcerative pancolitis with rectal bleeding (HCC)     Discoloration of skin of foot 05/21/2012    Eczema 05/21/2012     26 yo w chronic JACQUI and chronic diarrhea/colitis, but no available records nad patient is a poor historian, admitted w ongoing diarrhea as well as severe malnutrition w a total of about a 100 lb wt loss and worsening anemia. H/H 5.5/18.9, MCV 59, Fe 4% that could be nutritional vs chronic blood loss.  TSH is nl    Plan:   Supportive care and encourage nutrition  Will need EGD/CLS on Thursday and will treat his diarrhea according to CLS findings  Transfuse as needed  Will also order Free T4, CRP and Celiac labs  Will follow along     Shankar Chou MD       [phone]

## 2023-01-03 NOTE — PROGRESS NOTES
Hospitalist Progress Note      PCP: Stacie Do MD    Date of Admission: 1/2/2023    Chief Complaint: Diarrhea and weight loss    Subjective: no new c/o. Medications:  Reviewed    Infusion Medications    dextrose 5 % and 0.45 % NaCl 75 mL/hr at 01/03/23 1059    sodium chloride       Scheduled Medications    dicyclomine  10 mg IntraMUSCular Once    sodium chloride flush  5-40 mL IntraVENous 2 times per day     PRN Meds: sodium chloride flush, sodium chloride, ondansetron **OR** ondansetron, polyethylene glycol, acetaminophen **OR** acetaminophen      Intake/Output Summary (Last 24 hours) at 1/3/2023 1156  Last data filed at 1/3/2023 0323  Gross per 24 hour   Intake 3773.33 ml   Output --   Net 3773.33 ml       Physical Exam Performed:    BP (!) 91/55   Pulse 81   Temp 97.7 °F (36.5 °C) (Oral)   Resp 14   Ht 5' 2\" (1.575 m)   Wt 76 lb 4.5 oz (34.6 kg)   SpO2 100%   BMI 13.95 kg/m²     General appearance: No apparent distress, appears stated age and cooperative. HEENT: Pupils equal, round, and reactive to light. Conjunctivae/corneas clear. Neck: Supple, with full range of motion. No jugular venous distention. Trachea midline. Respiratory:  Normal respiratory effort. Clear to auscultation, bilaterally without Rales/Wheezes/Rhonchi. Cardiovascular: Regular rate and rhythm with normal S1/S2 without murmurs, rubs or gallops. Abdomen: Soft, non-tender, non-distended with normal bowel sounds. Musculoskeletal: No clubbing, cyanosis or edema bilaterally. Full range of motion without deformity. Skin: Skin color, texture, turgor normal.  No rashes or lesions. Neurologic:  Neurovascularly intact without any focal sensory/motor deficits.  Cranial nerves: II-XII intact, grossly non-focal.  Psychiatric: Alert and oriented, thought content appropriate, normal insight  Capillary Refill: Brisk,< 3 seconds   Peripheral Pulses: +2 palpable, equal bilaterally       Labs:   Recent Labs     01/02/23  1131 01/03/23  1026   WBC 11.9* 6.5   HGB 7.4* 5.5*   HCT 24.7* 18.9*   PLT 1,013* 505*     Recent Labs     01/02/23  1131 01/03/23  1026   * 138   K 5.1 3.6   CL 96* 110   CO2 19* 18*   BUN 54* 31*   CREATININE 2.7* 2.0*   CALCIUM 9.5 7.8*   PHOS  --  3.4     Recent Labs     01/02/23  1131   AST 26   ALT 15   BILITOT <0.2   ALKPHOS 114     No results for input(s): INR in the last 72 hours. No results for input(s): Sheila Alvarez in the last 72 hours. Urinalysis:    No results found for: NITRU, WBCUA, BACTERIA, RBCUA, BLOODU, SPECGRAV, GLUCOSEU    Consults:    IP CONSULT TO GI      Assessment/Plan:    Active Hospital Problems    Diagnosis     Colitis [K52.9]      Priority: Medium         Colitis - likely inflammatory bowel disease of unclear primary diagnosis. Long-standing in need of definitive dx and tx plan. GI consulted and appreciated in advance. NPO for possible procedure after Mn but expect will need colon prep w/ earliest procedure date Wed 4 Jan.      ARF - w/ elevated BUN/Cr ratio c/w pre-renal azotemia. Given IVF hydration and follow serial labs. Reviewed and documented as above. Now on Maintenance IVF continuously. HypoNatremia - etiology clinically unable to determine but likely hypovolemic. Follow serial labs on IVF. Reviewed and documented as above. Anemia - etiology clinically unable to determine, w/out evidence of active bleeding/hemolysis - likely 2nd to malabsorbtion of Iron. Clinically stable but decreased w/ hydration and now transfused 2 units PRBCs 3 Jan. Follow serial labs. Reviewed and documented as above. DVT Prophylaxis: IPC    Recent Labs     01/02/23  1131 01/03/23  1026   PLT 1,013* 505*     Diet: Diet NPO Exceptions are: Sips of Water with Meds  Code Status: Full Code      PT/OT Eval Status: not yet ordered.       Dispo - Patient is likely to remain in-house at least until Wed/Thurs 4/5 Chandan pending clinical course and subspecialty recs       Parvez Anthony Leigha Escobar MD

## 2023-01-03 NOTE — PLAN OF CARE
Problem: Discharge Planning  Goal: Discharge to home or other facility with appropriate resources  Outcome: Progressing  Flowsheets (Taken 1/2/2023 1932)  Discharge to home or other facility with appropriate resources: Identify barriers to discharge with patient and caregiver     Problem: Safety - Adult  Goal: Free from fall injury  Outcome: Progressing

## 2023-01-03 NOTE — FLOWSHEET NOTE
01/02/23 1934   Assessment   Charting Type Admission   Psychosocial   Psychosocial (WDL) WDL   Neurological   Neuro (WDL) WDL   Hazel Coma Scale   Eye Opening 4   Best Verbal Response 5   Best Motor Response 6   Hazel Coma Scale Score 15   HEENT (Head, Ears, Eyes, Nose, & Throat)   HEENT (WDL) X   Teeth Missing teeth   Respiratory   Respiratory (WDL) X   Breath Sounds   Right Upper Lobe Diminished   Right Middle Lobe Diminished   Right Lower Lobe Diminished   Left Upper Lobe Diminished   Left Lower Lobe Diminished   Cardiac   Cardiac (WDL) WDL   Gastrointestinal   Abdominal (WDL) X   GI Symptoms Diarrhea   Peripheral Vascular   Peripheral Vascular (WDL) WDL   Skin Integumentary    Skin Integumentary (WDL) WDL   Musculoskeletal   Musculoskeletal (WDL) X  (generalized weakness)

## 2023-01-03 NOTE — ED PROVIDER NOTES
I independently examined and evaluated Shyla Connell. I personally saw the patient and performed a substantive portion of the visit including all aspects of the medical decision making. I am the primary physician of record. In brief, Shyla Connell is a 27 y.o. male with a past medical history of ulcerative colitis, who presents to the ED complaining of poor p.o. intake. Patient was brought in by his father due to concern for malnourishment and dehydration. Patient has had significant weight loss since he was diagnosed with ulcerative colitis in 2019, reports approximately 100 pounds. He does not think that he has lost significant weight in the past month or so. He reports poor appetite, family reports that he does eat but nothing of significant nutritional value. He did have BM and nonbloody. No vomiting or dysuria or fever. He significant abdominal pain, family does report intermittent abdominal pain. No palliative or provocative factors. REVIEW OF SYSTEMS  All systems reviewed, pertinent positives per HPI otherwise noted to be negative. Focused exam revealed   PHYSICAL EXAM  BP 94/63  Pulse 84   Temp 97.5 °F (36.4 °C) (Oral)   Resp 14   Wt 76 lb 4.5 oz (34.6 kg)   SpO2 100%   BMI 13.95 kg/m²    GENERAL APPEARANCE: Awake and alert. Cooperative. Chronically ill-appearing. No distress. Cachectic  HENT: Normocephalic. Atraumatic. Mucous membranes are dry  NECK: Supple. Full range of motion of the neck without stiffness or pain. EYES: PERRL. EOM's grossly intact. HEART/CHEST: RRR. No murmurs. Chest wall is not tender to palpation. LUNGS: Respirations unlabored. CTAB. Good air exchange. Speaking comfortably in full sentences. ABDOMEN: No tenderness. Soft. Non-distended. No masses. No organomegaly. No guarding or rebound. MUSCULOSKELETAL: No extremity edema. Compartments soft. No deformity. No tenderness in the extremities. All extremities neurovascularly intact.   SKIN: Warm and dry. No acute rashes. NEUROLOGICAL: Alert and oriented. No gross facial drooping. Strength 5/5, sensation intact. PSYCHIATRIC: Normal mood and affect. ED course / MDM:   Overall, chronically ill appearing patient in no distress, presenting for poor oral intake. History obtained from patient and father. Physical exam remarkable for cachexia. Records Reviewed: No recent laboratory studies. I personally saw the patient and performed a substantive portion of the visit including all aspects of the medical decision making. Differential diagnosis includes but is not limited to: Ulcerative colitis, malnourishment, failure to thrive, acute kidney injury, electrolyte abnormalities      Workup showed:    Imaging:  CT ABDOMEN PELVIS W IV CONTRAST Additional Contrast? None   Final Result   Diffuse abnormal colonic wall thickening seen with surrounding injection of   the pericolonic fat, likely secondary to the reported history of ulcerative   colitis. No pericolonic abscess. Fatty liver and cholelithiasis      Mild left inguinal adenopathy, likely reactive. Imaging shows findings consistent with history of ulcerative colitis. No acute pathology identified at this time. No acute abnormality of the kidneys noted. ED Course as of 01/02/23 2130   Mon Jan 02, 2023   1417 Mild leukocytosis to 11.9. Stable anemia at 7.4. Thrombocytosis at 1013 [ER]   1417 Lipase within normal limits, low suspicion for pancreatitis [ER]   1417 Hyponatremia 131, hypochloremia 96. No other significant electrolyte abnormalities. [ER]   1008 Evidence of significant acute kidney injury with creatinine of 2.7. [ER]   9973 Liver function testing shows evidence of malnourishment, otherwise unremarkable.   Low suspicion for hepatitis or other acute liver pathology [ER]   1418 CT abd/pelvis: IMPRESSION:  Diffuse abnormal colonic wall thickening seen with surrounding injection of  the pericolonic fat, likely secondary to the reported history of ulcerative  colitis. No pericolonic abscess. Fatty liver and cholelithiasis     Mild left inguinal adenopathy, likely reactive. [ER]   6073 The Ekg interpreted by me shows  normal sinus rhythm with a rate of 82  Axis is   Normal  QTc is  within an acceptable range  Intervals and Durations are unremarkable. ST Segments: normal  No previous for comparison [ER]      ED Course User Index  [ER] Yaniv Pan MD       During the patient's ED course, the patient was given:  Medications   0.9 % sodium chloride infusion ( IntraVENous New Bag 1/2/23 1921)   ondansetron (ZOFRAN) injection 4 mg (4 mg IntraVENous Given 1/2/23 1208)   0.9 % sodium chloride bolus (0 mLs IntraVENous Stopped 1/2/23 1439)        CONSULTS: (Who and What was discussed)  IP CONSULT TO HOSPITALIST    Social Determinants affecting Dx or Tx: Patient exam eyes or eye is not    Is this patient to be included in the SEP-1 Core Measure due to severe sepsis or septic shock? No   Exclusion criteria - the patient is NOT to be included for SEP-1 Core Measure due to:  2+ SIRS criteria are not met    Based on results of work-up, I am concerned for failure to thrive and acute kidney injury in the setting of poor oral intake and diarrhea related to ulcerative colitis. Patient is receiving fluids for TASHI treatment. At this time, do feel the patient requires admission for further work-up and management. Discussed the patient with hospital team.  Patient and family is agreeable to admission at this time    CLINICAL IMPRESSION  1. Thrombocytosis, unspecified    2. History of ulcerative colitis    3. Weight loss    4. TASHI (acute kidney injury) ([facility] [address]        Blood pressure 101/62, pulse 99, temperature 97.5 °F (36.4 °C), temperature source Oral, resp. rate 14, weight 76 lb 4.5 oz (34.6 kg), SpO2 100 %. DISPOSITION  Clemencia Nam was admitted in stable condition.       All diagnostic, treatment, and disposition decisions were made by myself in conjunction with the advanced practice provider. For all further details of the patient's emergency department visit, please see the advanced practice provider's documentation. Comment: Please note this report has been produced using speech recognition software and may contain errors related to that system including errors in grammar, punctuation, and spelling, as well as words and phrases that may be inappropriate. If there are any questions or concerns please feel free to contact the dictating provider for clarification.         Mónica Fajardo MD  01/02/23 6834

## 2023-01-03 NOTE — PLAN OF CARE
Problem: Safety - Adult  Goal: Free from fall injury  Outcome: Progressing  Flowsheets (Taken 1/3/2023 7656)  Free From Fall Injury: Instruct family/caregiver on patient safety

## 2023-01-04 ENCOUNTER — ANESTHESIA EVENT (OUTPATIENT)
Dept: ENDOSCOPY | Age: 33
End: 2023-01-04
Payer: MEDICAID

## 2023-01-04 PROBLEM — E43 SEVERE MALNUTRITION (HCC): Chronic | Status: ACTIVE | Noted: 2023-01-04

## 2023-01-04 LAB
ALBUMIN SERPL-MCNC: 2.3 G/DL (ref 3.4–5)
ANION GAP SERPL CALCULATED.3IONS-SCNC: 10 MMOL/L (ref 3–16)
BUN BLDV-MCNC: 20 MG/DL (ref 7–20)
C DIFF TOXIN/ANTIGEN: NORMAL
C-REACTIVE PROTEIN: 80.5 MG/L (ref 0–5.1)
CALCIUM SERPL-MCNC: 8.1 MG/DL (ref 8.3–10.6)
CHLORIDE BLD-SCNC: 104 MMOL/L (ref 99–110)
CO2: 20 MMOL/L (ref 21–32)
CREAT SERPL-MCNC: 1.7 MG/DL (ref 0.9–1.3)
GFR SERPL CREATININE-BSD FRML MDRD: 54 ML/MIN/{1.73_M2}
GLUCOSE BLD-MCNC: 87 MG/DL (ref 70–99)
HCT VFR BLD CALC: 34.4 % (ref 40.5–52.5)
HEMATOLOGY PATH CONSULT: NO
HEMOGLOBIN: 10.5 G/DL (ref 13.5–17.5)
IGA: 239 MG/DL (ref 70–400)
MAGNESIUM: 2 MG/DL (ref 1.8–2.4)
MCH RBC QN AUTO: 21 PG (ref 26–34)
MCHC RBC AUTO-ENTMCNC: 30.4 G/DL (ref 31–36)
MCV RBC AUTO: 69 FL (ref 80–100)
PDW BLD-RTO: 29.1 % (ref 12.4–15.4)
PHOSPHORUS: 2.4 MG/DL (ref 2.5–4.9)
PLATELET # BLD: 544 K/UL (ref 135–450)
PMV BLD AUTO: 7.1 FL (ref 5–10.5)
POTASSIUM SERPL-SCNC: 3.5 MMOL/L (ref 3.5–5.1)
RBC # BLD: 4.98 M/UL (ref 4.2–5.9)
SODIUM BLD-SCNC: 134 MMOL/L (ref 136–145)
T4 FREE: 1.2 NG/DL (ref 0.9–1.8)
TISSUE TRANSGLUTAMINASE IGA: <0.5 U/ML (ref 0–14)
WBC # BLD: 9.4 K/UL (ref 4–11)

## 2023-01-04 PROCEDURE — 36415 COLL VENOUS BLD VENIPUNCTURE: CPT

## 2023-01-04 PROCEDURE — 2580000003 HC RX 258: Performed by: INTERNAL MEDICINE

## 2023-01-04 PROCEDURE — 6370000000 HC RX 637 (ALT 250 FOR IP): Performed by: NURSE PRACTITIONER

## 2023-01-04 PROCEDURE — 97165 OT EVAL LOW COMPLEX 30 MIN: CPT

## 2023-01-04 PROCEDURE — 80069 RENAL FUNCTION PANEL: CPT

## 2023-01-04 PROCEDURE — 97162 PT EVAL MOD COMPLEX 30 MIN: CPT

## 2023-01-04 PROCEDURE — 85027 COMPLETE CBC AUTOMATED: CPT

## 2023-01-04 PROCEDURE — 83516 IMMUNOASSAY NONANTIBODY: CPT

## 2023-01-04 PROCEDURE — 84439 ASSAY OF FREE THYROXINE: CPT

## 2023-01-04 PROCEDURE — 97535 SELF CARE MNGMENT TRAINING: CPT

## 2023-01-04 PROCEDURE — 97530 THERAPEUTIC ACTIVITIES: CPT

## 2023-01-04 PROCEDURE — 2580000003 HC RX 258: Performed by: NURSE PRACTITIONER

## 2023-01-04 PROCEDURE — 6360000002 HC RX W HCPCS: Performed by: NURSE PRACTITIONER

## 2023-01-04 PROCEDURE — 97116 GAIT TRAINING THERAPY: CPT

## 2023-01-04 PROCEDURE — 1200000000 HC SEMI PRIVATE

## 2023-01-04 PROCEDURE — 83735 ASSAY OF MAGNESIUM: CPT

## 2023-01-04 PROCEDURE — 86140 C-REACTIVE PROTEIN: CPT

## 2023-01-04 PROCEDURE — 82784 ASSAY IGA/IGD/IGG/IGM EACH: CPT

## 2023-01-04 RX ADMIN — POLYETHYLENE GLYCOL 3350, SODIUM SULFATE ANHYDROUS, SODIUM BICARBONATE, SODIUM CHLORIDE, POTASSIUM CHLORIDE 4000 ML: 236; 22.74; 6.74; 5.86; 2.97 POWDER, FOR SOLUTION ORAL at 15:28

## 2023-01-04 RX ADMIN — IRON SUCROSE 200 MG: 20 INJECTION, SOLUTION INTRAVENOUS at 14:58

## 2023-01-04 RX ADMIN — DEXTROSE AND SODIUM CHLORIDE: 5; 450 INJECTION, SOLUTION INTRAVENOUS at 20:01

## 2023-01-04 NOTE — PROGRESS NOTES
PROGRESS NOTE    HPI: Paige Cisneros is a(n)32 y.o. male admitted for work-up and treatment for Colitis [K52.9]  Weight loss [R63.4]  TASHI (acute kidney injury) ([facility] [address] [N17.9]  History of ulcerative colitis [Z87.19]  Thrombocytosis, unspecified [D75.839]. We are following for JACQUI, chronic diarrhea. Subjective:     Has diarrhea at baseline but reports more frequent at present. Occurs throughout the night as well. No rectal bleeding. Objective:     I/O last 3 completed shifts: In: 2050.1 [P.O.:240; I.V.:1183; Blood:627.1]  Out: 1 [Emesis/NG output:1]      /64   Pulse 60   Temp 98 °F (36.7 °C) (Oral)   Resp 16   Ht 5' 2\" (1.575 m)   Wt 76 lb 4.5 oz (34.6 kg)   SpO2 100%   BMI 13.95 kg/m²     Physical Exam:  HEENT: anicteric sclera, oropharyngeal membranes pink and moist.  Cor: RRR  Lungs: non-labored, no respiratory distress  Abdomen: soft, + BS, NT. No ascites. No hepatomegaly or splenomegaly  Extremities: no edema  Neuro: alert and oriented x 3, no asterixis  Skin:    Results:   Lab Results   Component Value Date    ALT 15 01/02/2023    AST 26 01/02/2023    ALKPHOS 114 01/02/2023    PROT 7.9 01/02/2023    LABALBU 1.9 (L) 01/03/2023    LIPASE 45.0 01/02/2023     Lab Results   Component Value Date    WBC 6.5 01/03/2023    HGB 10.7 (L) 01/03/2023    HCT 35.2 (L) 01/03/2023    MCV 58.6 (L) 01/03/2023     (H) 01/03/2023     BUN/Cr/glu/ALT/AST/amyl/lip:  31/2.0/--/--/--/--/-- (01/03 1026)  CT ABDOMEN PELVIS W IV CONTRAST Additional Contrast? None    Result Date: 1/2/2023  Diffuse abnormal colonic wall thickening seen with surrounding injection of the pericolonic fat, likely secondary to the reported history of ulcerative colitis. No pericolonic abscess. Fatty liver and cholelithiasis Mild left inguinal adenopathy, likely reactive. Impression:  JACQUI. - transfused 2 units 1/3. Hgb stable since. No overt bleeding.     Chronic diarrhea/ diffuse colitis. - Remote hx ulcerative colitis. Says stopped medications several years ago because they were making him feel worse - cannot recal what he was on. CRP 80. Celiac screen normal.  C. Diff negative. 4. Weight loss. 5. Malnutrition. Plan:  EGD and colonoscopy tomorrow. Further work-up pending results. Transfusion as needed. Venofer. Fecal calprotectin. Nutrition consult. May need to consider peg tube if cannot maintain caloric intake. Please do not hesitate to call with questions or concerns.       Electronically signed by: KATTY Morrow 1/4/2023 8:16 AM

## 2023-01-04 NOTE — PROGRESS NOTES
AVASYS placed in room for safety. Pt does not wait for RN to get to room when going to bathroom. Pt aware of AVASYS.

## 2023-01-04 NOTE — PLAN OF CARE
Problem: Discharge Planning  Goal: Discharge to home or other facility with appropriate resources  Outcome: Progressing  Flowsheets (Taken 1/4/2023 1016)  Discharge to home or other facility with appropriate resources: Identify barriers to discharge with patient and caregiver     Problem: Safety - Adult  Goal: Free from fall injury  Outcome: Progressing  Flowsheets (Taken 1/4/2023 1016)  Free From Fall Injury: Instruct family/caregiver on patient safety

## 2023-01-04 NOTE — PROGRESS NOTES
Physical Therapy  Facility/Department: [place] [address] [facility]  Physical Therapy Initial Assessment/Discharge Summary (1x only)    Name: Rubén Sheriff  : 1990  MRN: [id number]  Date of Service: 2023    Discharge Recommendations:  Home with assist PRN   PT Equipment Recommendations  Equipment Needed: No      Patient Diagnosis(es): The primary encounter diagnosis was Thrombocytosis, unspecified. Diagnoses of History of ulcerative colitis, Weight loss, TASHI (acute kidney injury) ([facility] [address] and Anemia, unspecified type were also pertinent to this visit. Past Medical History:  has a past medical history of Pain in upper jaw. Past Surgical History:  has a past surgical history that includes Tympanostomy tube placement; External ear surgery; Colonoscopy (N/A, 2019); and Upper gastrointestinal endoscopy (N/A, 2019). Assessment   Assessment: Pt referred for PT evaluation during current hospital stay with dx of colitis. Pt referred to PT in part to assess balance and mobility following a fall earlier this morning. Prior to fall, pt was ambulating in room independently without assist from nursing. Pt functioning essentially at his baseline, performing sit<>stand transfers and ambulation in hallway with SBA without AD. Pt appears mildly unsteady at times but able to control his posture well without LOB. Pt states he feels his fall this morning was related to being groggy from having just woken up and denies further PT needs. \"I feel like I'm back to normal.\"  PT will sign off at this time. Therapy Prognosis: Good  Decision Making: Medium Complexity  No Skilled PT: At baseline function  Requires PT Follow-Up: No  Activity Tolerance: Patient tolerated evaluation without incident;Patient tolerated treatment well     Plan   General Plan: Discharge with evaluation only  Safety Devices:  All fall risk precautions in place, Call light within reach, Chair alarm in place, Gait belt, Patient at risk for falls, Left in chair, Nurse notified     Restrictions  Restrictions/Precautions  Restrictions/Precautions: Fall Risk, General Precautions  Position Activity Restriction  Other position/activity restrictions: High fall risk per nursing assessment, up as tolerated, IV lines     Subjective   Pain: Pt denies. General  Chart Reviewed: Yes  Patient assessed for rehabilitation services?: Yes  Family / Caregiver Present: No  Referring Practitioner: Bennett Young MD  Referral Date : 01/04/23  Diagnosis: Colitis  Follows Commands: Within Functional Limits  General Comment: Pt resting in bed upon entry of therapy staff  Subjective: Pt agreeable to work with PT this afternoon, very pleasant and cooperative. States he feels his recent fall was related to \"still feeling groggy\" after getting up from a nap to walk to the bathroom.     Social/Functional History  Social/Functional History  Type of Home: House  Home Layout: One level  Home Access: Stairs to enter without rails  Entrance Stairs - Number of Steps: 2  Bathroom Shower/Tub: Tub/Shower unit  Bathroom Toilet: Standard  Has the patient had two or more falls in the past year or any fall with injury in the past year?: No  ADL Assistance: Independent  Homemaking Assistance: Independent  Homemaking Responsibilities: Yes (\"I could but I'm lazy\")  Meal Prep Responsibility: Secondary  Laundry Responsibility: Primary  Cleaning Responsibility: No  Shopping Responsibility: Primary  Ambulation Assistance: Independent  Transfer Assistance: Independent  Active : Yes  Occupation: Unemployed  [facility]: playing on phone    [address]: Impaired  Vision Exceptions: Wears glasses for reading  Hearing  Hearing: Within functional limits      Cognition   Orientation  Overall Orientation Status: Within Functional Limits     Objective   Vitals  Heart Rate: 87  Heart Rate Source: Monitor  BP: 93/60  BP Location: Right upper arm  Patient Position: Sitting  MAP (Calculated): 71  Resp: 18  SpO2: 98 %  O2 Device: None (Room air)    Gross Assessment  AROM: Generally decreased, functional  Strength: Generally decreased, functional  Coordination: Generally decreased, functional  Tone: Normal     Bed Mobility Training  Supine to Sit: Independent  Sit to Supine: Independent  Scooting: Independent    Balance  Sitting: Intact  Standing: Intact (without AD)    Transfer Training  Sit to Stand: Stand-by assistance  Stand to Sit: Stand-by assistance  Bed to Chair: Stand-by assistance    Gait Training  Overall Level of Assistance: Stand-by assistance  Base of Support: Widened  Speed/Swapna: Pace decreased (< 100 feet/min)  Step Length: Left shortened;Right shortened  Gait Abnormalities: Step to gait; Decreased step clearance (gait appears slow but fairly steady, no LOB, pt states he feels he's at his baseline)  Distance (ft): 100 Feet  Assistive Device: Other (comment) (without AD)    AM-PAC Score  AM-PAC Inpatient Mobility Raw Score : 23 (01/04/23 1558)  AM-PAC Inpatient T-Scale Score : 56.93 (01/04/23 1558)  Mobility Inpatient CMS 0-100% Score: 11.2 (01/04/23 1558)  Mobility Inpatient CMS G-Code Modifier : CI (01/04/23 1558)    Goals  Short Term Goals  Time Frame for Short Term Goals: 1 visit - 1/04/23  Short Term Goal 1: Pt will transfer supine <-> sit with (I) - MET 1/04/23  Short Term Goal 2: Pt will transfer sit <-> stand with supervision - MET 1/04/23  Short Term Goal 3: Pt will ambulate x 100 feet without AD with SBA - MET 1/04/23  Patient Goals   Patient Goals : \"To go home\"       Education  Patient Education  Education Given To: Patient  Education Provided: Role of Therapy;Plan of Care;Precautions;Transfer Training; Fall Prevention Strategies  Education Method: Demonstration;Verbal  Barriers to Learning: None  Education Outcome: Verbalized understanding;Demonstrated understanding      Therapy Time   Individual Concurrent Group Co-treatment   Time In 1440         Time Out 4780 Minutes 35         Timed Code Treatment Minutes: [address], [place] #[id number]    If pt is unable to be seen after this session, please let this note serve as discharge summary. Please see case management note for discharge disposition. Thank you.

## 2023-01-04 NOTE — PROGRESS NOTES
Pt assessment completed and charted. VSS. Pt a/o x4. BP soft at 96/57. H/H came up to 10.7 after 2 units or PRBCs. Pt independent on ambulation. Bed in lowest position and wheels locked. Call light within reach. Bedside table within reach. Non-skid footwear in place. Pt denies any other needs at this time. Pt calls out appropriately.  Will continue to monit

## 2023-01-04 NOTE — PROGRESS NOTES
Comprehensive Nutrition Assessment    Type and Reason for Visit:  Initial (low BMI)    Nutrition Recommendations/Plan:   Continue clear liquid diet - advance as feasible   Encourage PO intakes as tolerated   Declined ONS   If PO intakes remain poor, may need to consider alternative means of nutrition. Consult dietitian for recommendations   Monitor nutrition adequacy, pertinent labs, bowel habits, wt changes, and clinical progress     Malnutrition Assessment:  Malnutrition Status:  Severe malnutrition (01/04/23 1206)    Context:  Chronic Illness     Findings of the 6 clinical characteristics of malnutrition:  Energy Intake:  75% or less estimated energy requirements for 1 month or longer  Body Fat Loss:  Severe body fat loss Orbital, Buccal region   Muscle Mass Loss:  Severe muscle mass loss Temples (temporalis), Clavicles (pectoralis & deltoids)    Nutrition Assessment:    Pt seen for low BMI. Pt severely malnourished AEB poor PO intakes and severe muscle and fat loss. Pt distracted at time of visit, playing games on phone. Pt reports 100 lb weight loss over past 4 years following diagnosis of ulcerative colitis. States frequent diarrhea. GI following. Pt reports minimal PO intakes and 10 lb weight loss over past 2 weeks. Currently ordered clear liquid diet. Reports tolerating liquids well, but will get diarrhea if he drinks too much. Declined ONS. Will monitor diet advancement. D/t severe malnutrition, may need to consider alternative means of nutrition. Will monitor. Nutrition Related Findings:    Appears frail. Active BS. BM on 1/3. +2 generalized edema. Wound Type: None       Current Nutrition Intake & Therapies:    Average Meal Intake: Unable to assess  Average Supplements Intake: None Ordered  ADULT DIET; Clear Liquid    Anthropometric Measures:  Height: 5' 2\" (157.5 cm)  Ideal Body Weight (IBW): 118 lbs (54 kg)       Current Body Weight: 76 lb (34.5 kg), 64.4 % IBW.  Weight Source: Bed Scale  Current BMI (kg/m2): 13.9                          BMI Categories: Underweight (BMI less than 18.5)    Estimated Daily Nutrient Needs:  Energy Requirements Based On: Kcal/kg (30-35)  Weight Used for Energy Requirements: Ideal  Energy (kcal/day): 6570-5141 kcal  Weight Used for Protein Requirements: Ideal (1.2-1.5 g/kg)  Protein (g/day): 64-81 g  Method Used for Fluid Requirements: 1 ml/kcal  Fluid (ml/day): 2681-7960 mL    Nutrition Diagnosis:   Severe malnutrition related to inadequate protein-energy intake as evidenced by poor intake prior to admission, severe loss of subcutaneous fat, severe muscle loss    Nutrition Interventions:   Food and/or Nutrient Delivery: Continue Current Diet  Nutrition Education/Counseling: No recommendation at this time  Coordination of Nutrition Care: Continue to monitor while inpatient       Goals:     Goals: Meet at least 75% of estimated needs, prior to discharge       Nutrition Monitoring and Evaluation:   Behavioral-Environmental Outcomes: None Identified  Food/Nutrient Intake Outcomes: Diet Advancement/Tolerance, Food and Nutrient Intake  Physical Signs/Symptoms Outcomes: Biochemical Data, GI Status, Meal Time Behavior, Nutrition Focused Physical Findings, Weight    Discharge Planning:     Too soon to determine     Bob Grant [address], RD, LD  Contact: [phone]

## 2023-01-04 NOTE — PROGRESS NOTES
Pt pulled red cord while in bathroom. Charge RN entered room and pt was on floor in. Unwitnessed fall. Pt A&Ox4, denies pain anywhere. VSS  temp 97.8, BP 93/60 , HR 87, O2 98%. Pt states he just got weak, he feels fine and denies pain. Pt back in bed. Call light within reach, bed in lowest position, wheels locked, nonskid socks on. Bed alarm on and audible.

## 2023-01-04 NOTE — PROGRESS NOTES
Hospitalist Progress Note      PCP: Uzma Rose MD    Date of Admission: 1/2/2023    Chief Complaint: Diarrhea and weight loss    Subjective: no new c/o. Medications:  Reviewed    Infusion Medications    dextrose 5 % and 0.45 % NaCl 75 mL/hr at 01/03/23 1059    sodium chloride      sodium chloride       Scheduled Medications    dicyclomine  10 mg IntraMUSCular Once    sodium chloride flush  5-40 mL IntraVENous 2 times per day     PRN Meds: sodium chloride, sodium chloride flush, sodium chloride, ondansetron **OR** ondansetron, polyethylene glycol, acetaminophen **OR** acetaminophen      Intake/Output Summary (Last 24 hours) at 1/4/2023 1055  Last data filed at 1/3/2023 1920  Gross per 24 hour   Intake 1810.08 ml   Output 1 ml   Net 1809.08 ml         Physical Exam Performed:    BP 93/60   Pulse 87   Temp 97.8 °F (36.6 °C) (Oral)   Resp 18   Ht 5' 2\" (1.575 m)   Wt 76 lb 4.5 oz (34.6 kg)   SpO2 98%   BMI 13.95 kg/m²     General appearance: No apparent distress, appears stated age and cooperative. HEENT: Pupils equal, round, and reactive to light. Conjunctivae/corneas clear. Neck: Supple, with full range of motion. No jugular venous distention. Trachea midline. Respiratory:  Normal respiratory effort. Clear to auscultation, bilaterally without Rales/Wheezes/Rhonchi. Cardiovascular: Regular rate and rhythm with normal S1/S2 without murmurs, rubs or gallops. Abdomen: Soft, non-tender, non-distended with normal bowel sounds. Musculoskeletal: No clubbing, cyanosis or edema bilaterally. Full range of motion without deformity. Skin: Skin color, texture, turgor normal.  No rashes or lesions. Neurologic:  Neurovascularly intact without any focal sensory/motor deficits.  Cranial nerves: II-XII intact, grossly non-focal.  Psychiatric: Alert and oriented, thought content appropriate, normal insight  Capillary Refill: Brisk,< 3 seconds   Peripheral Pulses: +2 palpable, equal bilaterally       Labs: Recent Labs     01/02/23  1131 01/03/23  1026 01/03/23 2008 01/04/23  0737   WBC 11.9* 6.5  --  9.4   HGB 7.4* 5.5* 10.7* 10.5*   HCT 24.7* 18.9* 35.2* 34.4*   PLT 1,013* 505*  --  544*       Recent Labs     01/02/23  1131 01/03/23  1026 01/04/23  0737   * 138 134*   K 5.1 3.6 3.5   CL 96* 110 104   CO2 19* 18* 20*   BUN 54* 31* 20   CREATININE 2.7* 2.0* 1.7*   CALCIUM 9.5 7.8* 8.1*   PHOS  --  3.4 2.4*       Recent Labs     01/02/23  1131   AST 26   ALT 15   BILITOT <0.2   ALKPHOS 114       No results for input(s): INR in the last 72 hours. No results for input(s): Duyen Earnest in the last 72 hours. Urinalysis:    No results found for: NITRU, WBCUA, BACTERIA, RBCUA, BLOODU, SPECGRAV, GLUCOSEU    Consults:    IP CONSULT TO GI      Assessment/Plan:    Active Hospital Problems    Diagnosis     Colitis [K52.9]      Priority: Medium         Colitis - likely inflammatory bowel disease of unclear primary diagnosis. Long-standing in need of definitive dx and tx plan. GI consulted and appreciated w/ plan for EGD/Colon Thurs 5 Jan.      ARF - w/ elevated BUN/Cr ratio c/w pre-renal azotemia. Given IVF hydration and follow serial labs. Reviewed and documented as above. Now on Maintenance IVF continuously. HypoNatremia - etiology clinically unable to determine but likely hypovolemic. Follow serial labs on IVF. Reviewed and documented as above. Anemia - etiology clinically unable to determine, w/out evidence of active bleeding/hemolysis - likely 2nd to malabsorbtion of Iron. Clinically stable but decreased w/ hydration and now transfused 2 units PRBCs 3 Jan. Follow serial labs. Reviewed and documented as above. DVT Prophylaxis: IPC    Recent Labs     01/02/23  1131 01/03/23 1026 01/04/23  0737   PLT 1,013* 505* 544*       Diet: ADULT DIET; Clear Liquid  Code Status: Full Code      PT/OT Eval Status: ordered and pending.       Dispo - Patient is likely to remain in-house at least until Sat/Sunday 7/8 Jan pending clinical course and PT/OT eval/recs        John Middleton MD

## 2023-01-04 NOTE — PROGRESS NOTES
Occupational Therapy  Facility/Department: [place] [address] REMOTE TELEMETRY  Occupational Therapy Initial Assessment/Treatment/Discharge Summary  1x only    Name: Demetrice Luque  : 1990  MRN: [id number]  Date of Service: 2023    Discharge Recommendations:  Home with assist PRN          Patient Diagnosis(es): The primary encounter diagnosis was Thrombocytosis, unspecified. Diagnoses of History of ulcerative colitis, Weight loss, TASHI (acute kidney injury) ([address] and Anemia, unspecified type were also pertinent to this visit. Past Medical History:  has a past medical history of Pain in upper jaw. Past Surgical History:  has a past surgical history that includes Tympanostomy tube placement; External ear surgery; Colonoscopy (N/A, 2019); and Upper gastrointestinal endoscopy (N/A, 2019). Assessment   Patient evaluated this date, dx of colitis with fall in hospital. Patient today SBA for transfers mobility and ADLs. No further OT needs, will sign off. Pt does live with family. Prognosis: Good  Decision Making: Low Complexity  REQUIRES OT FOLLOW-UP: No        Plan   Occupational Therapy Plan  Times Per Week: 1x only     Restrictions  Restrictions/Precautions  Restrictions/Precautions: Fall Risk, General Precautions  Position Activity Restriction  Other position/activity restrictions: High fall risk per nursing assessment, up as tolerated, IV lines    Subjective   General  Chart Reviewed: Yes, Orders, Progress Notes, History and Physical  Patient assessed for rehabilitation services?: Yes  Family / Caregiver Present: No  Referring Practitioner: Анна Timmons MD  Diagnosis: Colitis  Subjective  Subjective: Pt pleasant and agreeable to OT evaluation, states fall happened because he was still sleepy and rushing to toilet and feet just went out from under him.   denies  Social/Functional History  Social/Functional History  Type of Home: House  Home Layout: One level  Home Access: Stairs to enter without rails  Entrance Stairs - Number of Steps: 2  Bathroom Shower/Tub: Tub/Shower unit  Bathroom Toilet: Standard  Has the patient had two or more falls in the past year or any fall with injury in the past year?: No  ADL Assistance: [address]: Independent  Homemaking Responsibilities: Yes (\"I could but I'm lazy\")  Meal Prep Responsibility: Secondary  Laundry Responsibility: Primary  Cleaning Responsibility: No  Shopping Responsibility: Primary  Ambulation Assistance: Independent  Transfer Assistance: Independent  Active : Yes  Occupation: Unemployed  Leisure & Hobbies: playing on phone       Objective   Heart Rate: 87  Heart Rate Source: Monitor  BP: 93/60  BP Location: Right upper arm  Patient Position: Sitting  MAP (Calculated): 71  Resp: 18  SpO2: 98 %  O2 Device: None (Room air)       Denies Pain        Safety Devices  Type of Devices: All fall risk precautions in place;Call light within reach; Chair alarm in place;Gait belt;Patient at risk for falls; Left in chair;Nurse notified    Bed Mobility Training  Bed Mobility Training: Yes  Supine to Sit: Independent  Sit to Supine: Independent  Scooting: Independent  Balance  Sitting: Intact  Standing: Intact (without AD)  Transfer Training  Transfer Training: Yes  Sit to Stand: Stand-by assistance  Stand to Sit: Stand-by assistance  Bed to Chair: Stand-by assistance  Gait  Overall Level of Assistance: Stand-by assistance  Base of Support: Widened  Speed/Swapna: Pace decreased (< 100 feet/min)  Step Length: Left shortened;Right shortened  Gait Abnormalities: Step to gait; Decreased step clearance (gait appears slow but fairly steady, no LOB, pt states he feels he's at his baseline)  Distance (ft): 100 Feet  Assistive Device: Other (comment) (without AD)     AROM: Within functional limits  PROM: Within functional limits  Strength:  Within functional limits  Coordination: Within functional limits  Tone: Normal  Sensation: Intact Activity Tolerance  Activity Tolerance: Patient tolerated evaluation without incident;Patient tolerated treatment well        Vision  Vision: Impaired  Vision Exceptions: Wears glasses for reading  Hearing  Hearing: Within functional limits  Cognition  Overall Cognitive Status: Exceptions  Arousal/Alertness: Appropriate responses to stimuli  Following Commands: Follows all commands without difficulty  Attention Span: Appears intact  Memory: Appears intact  Safety Judgement: Good awareness of safety precautions  Problem Solving: Decreased awareness of errors  Insights: Decreased awareness of deficits (regarding the importance of drinking golytle bowel prep for colonscopy tomorrow.)  Initiation: Does not require cues  Orientation  Overall Orientation Status: Within Functional Limits                  Education Given To: Patient  Education Provided: Role of Therapy;Plan of Care;ADL Adaptive Strategies;Transfer Training; Fall Prevention Strategies  Education Provided Comments: Disease specific: Given patient's history of falls, patient educated on fall risks and prevention techniques, including: While in the hospital, CALL before you FALL; bed/chair alarms; wearing non-skid socks/shoes; having all needed items within reach, including nurse call light. Patient verbalized understanding.    Education Method: Demonstration;Verbal  Barriers to Learning: None  Education Outcome: Verbalized understanding;Demonstrated understanding              AM-PAC Score        -[facility] Inpatient Daily Activity Raw Score: 24 (01/04/23 1557)  AM-PAC Inpatient ADL T-Scale Score : 57.54 (01/04/23 1557)  ADL Inpatient CMS 0-100% Score: 0 (01/04/23 1557)  ADL Inpatient CMS G-Code Modifier : [address] (01/04/23 1557)         Goals  Short Term Goals  Time Frame for Short Term Goals: 1 session  Short Term Goal 1: Pt will complete functional transfers with supervision-stg met 1/4/23  Patient Goals   Patient goals : \"to go home\"       Therapy Time   Individual Concurrent Group Co-treatment   Time In 1440         Time Out 1515         Minutes 35         Timed Code Treatment Minutes: [address], OTR/L

## 2023-01-05 ENCOUNTER — ANESTHESIA (OUTPATIENT)
Dept: ENDOSCOPY | Age: 33
End: 2023-01-05
Payer: MEDICAID

## 2023-01-05 LAB
ALBUMIN SERPL-MCNC: 2.2 G/DL (ref 3.4–5)
ANION GAP SERPL CALCULATED.3IONS-SCNC: 11 MMOL/L (ref 3–16)
BUN BLDV-MCNC: 13 MG/DL (ref 7–20)
CALCIUM SERPL-MCNC: 8.2 MG/DL (ref 8.3–10.6)
CHLORIDE BLD-SCNC: 101 MMOL/L (ref 99–110)
CO2: 20 MMOL/L (ref 21–32)
CREAT SERPL-MCNC: 1.5 MG/DL (ref 0.9–1.3)
GFR SERPL CREATININE-BSD FRML MDRD: >60 ML/MIN/{1.73_M2}
GLUCOSE BLD-MCNC: 86 MG/DL (ref 70–99)
HCT VFR BLD CALC: 33.7 % (ref 40.5–52.5)
HEMATOLOGY PATH CONSULT: NO
HEMOGLOBIN: 10.5 G/DL (ref 13.5–17.5)
MAGNESIUM: 1.9 MG/DL (ref 1.8–2.4)
MCH RBC QN AUTO: 21 PG (ref 26–34)
MCHC RBC AUTO-ENTMCNC: 31 G/DL (ref 31–36)
MCV RBC AUTO: 67.6 FL (ref 80–100)
PDW BLD-RTO: 29.1 % (ref 12.4–15.4)
PHOSPHORUS: 2.2 MG/DL (ref 2.5–4.9)
PLATELET # BLD: 478 K/UL (ref 135–450)
PMV BLD AUTO: 6.5 FL (ref 5–10.5)
POTASSIUM SERPL-SCNC: 3.2 MMOL/L (ref 3.5–5.1)
RBC # BLD: 4.99 M/UL (ref 4.2–5.9)
SODIUM BLD-SCNC: 132 MMOL/L (ref 136–145)
WBC # BLD: 6.5 K/UL (ref 4–11)

## 2023-01-05 PROCEDURE — 85027 COMPLETE CBC AUTOMATED: CPT

## 2023-01-05 PROCEDURE — 3609010300 HC COLONOSCOPY W/BIOPSY SINGLE/MULTIPLE: Performed by: INTERNAL MEDICINE

## 2023-01-05 PROCEDURE — 3700000001 HC ADD 15 MINUTES (ANESTHESIA): Performed by: INTERNAL MEDICINE

## 2023-01-05 PROCEDURE — 2709999900 HC NON-CHARGEABLE SUPPLY: Performed by: INTERNAL MEDICINE

## 2023-01-05 PROCEDURE — 6370000000 HC RX 637 (ALT 250 FOR IP): Performed by: INTERNAL MEDICINE

## 2023-01-05 PROCEDURE — 3700000000 HC ANESTHESIA ATTENDED CARE: Performed by: INTERNAL MEDICINE

## 2023-01-05 PROCEDURE — 6360000002 HC RX W HCPCS: Performed by: NURSE PRACTITIONER

## 2023-01-05 PROCEDURE — 2580000003 HC RX 258: Performed by: NURSE ANESTHETIST, CERTIFIED REGISTERED

## 2023-01-05 PROCEDURE — 0DJ08ZZ INSPECTION OF UPPER INTESTINAL TRACT, VIA NATURAL OR ARTIFICIAL OPENING ENDOSCOPIC: ICD-10-PCS | Performed by: HOSPITALIST

## 2023-01-05 PROCEDURE — 80069 RENAL FUNCTION PANEL: CPT

## 2023-01-05 PROCEDURE — 0DJD8ZZ INSPECTION OF LOWER INTESTINAL TRACT, VIA NATURAL OR ARTIFICIAL OPENING ENDOSCOPIC: ICD-10-PCS | Performed by: HOSPITALIST

## 2023-01-05 PROCEDURE — 3609012400 HC EGD TRANSORAL BIOPSY SINGLE/MULTIPLE: Performed by: INTERNAL MEDICINE

## 2023-01-05 PROCEDURE — 2580000003 HC RX 258: Performed by: NURSE PRACTITIONER

## 2023-01-05 PROCEDURE — 7100000010 HC PHASE II RECOVERY - FIRST 15 MIN: Performed by: INTERNAL MEDICINE

## 2023-01-05 PROCEDURE — 88305 TISSUE EXAM BY PATHOLOGIST: CPT

## 2023-01-05 PROCEDURE — 2500000003 HC RX 250 WO HCPCS

## 2023-01-05 PROCEDURE — 2500000003 HC RX 250 WO HCPCS: Performed by: ANESTHESIOLOGY

## 2023-01-05 PROCEDURE — 83735 ASSAY OF MAGNESIUM: CPT

## 2023-01-05 PROCEDURE — 6360000002 HC RX W HCPCS: Performed by: NURSE ANESTHETIST, CERTIFIED REGISTERED

## 2023-01-05 PROCEDURE — 36415 COLL VENOUS BLD VENIPUNCTURE: CPT

## 2023-01-05 PROCEDURE — 1200000000 HC SEMI PRIVATE

## 2023-01-05 PROCEDURE — 7100000011 HC PHASE II RECOVERY - ADDTL 15 MIN: Performed by: INTERNAL MEDICINE

## 2023-01-05 PROCEDURE — 2500000003 HC RX 250 WO HCPCS: Performed by: NURSE ANESTHETIST, CERTIFIED REGISTERED

## 2023-01-05 RX ORDER — SODIUM CHLORIDE 0.9 % (FLUSH) 0.9 %
5-40 SYRINGE (ML) INJECTION EVERY 12 HOURS SCHEDULED
Status: DISCONTINUED | OUTPATIENT
Start: 2023-01-05 | End: 2023-01-07 | Stop reason: HOSPADM

## 2023-01-05 RX ORDER — SODIUM CHLORIDE, SODIUM LACTATE, POTASSIUM CHLORIDE, CALCIUM CHLORIDE 600; 310; 30; 20 MG/100ML; MG/100ML; MG/100ML; MG/100ML
INJECTION, SOLUTION INTRAVENOUS CONTINUOUS PRN
Status: DISCONTINUED | OUTPATIENT
Start: 2023-01-05 | End: 2023-01-05 | Stop reason: SDUPTHER

## 2023-01-05 RX ORDER — PANTOPRAZOLE SODIUM 40 MG/1
40 TABLET, DELAYED RELEASE ORAL
Status: DISCONTINUED | OUTPATIENT
Start: 2023-01-05 | End: 2023-01-07 | Stop reason: HOSPADM

## 2023-01-05 RX ORDER — SODIUM CHLORIDE 9 MG/ML
INJECTION, SOLUTION INTRAVENOUS PRN
Status: DISCONTINUED | OUTPATIENT
Start: 2023-01-05 | End: 2023-01-05 | Stop reason: HOSPADM

## 2023-01-05 RX ORDER — LIDOCAINE HYDROCHLORIDE 10 MG/ML
5 INJECTION, SOLUTION INFILTRATION; PERINEURAL ONCE
Status: DISCONTINUED | OUTPATIENT
Start: 2023-01-05 | End: 2023-01-07 | Stop reason: HOSPADM

## 2023-01-05 RX ORDER — EPHEDRINE SULFATE 50 MG/ML
20 INJECTION, SOLUTION INTRAVENOUS ONCE
Status: COMPLETED | OUTPATIENT
Start: 2023-01-05 | End: 2023-01-05

## 2023-01-05 RX ORDER — MESALAMINE 400 MG/1
800 CAPSULE, DELAYED RELEASE ORAL 3 TIMES DAILY
Status: DISCONTINUED | OUTPATIENT
Start: 2023-01-05 | End: 2023-01-06

## 2023-01-05 RX ORDER — SODIUM CHLORIDE 9 MG/ML
25 INJECTION, SOLUTION INTRAVENOUS PRN
Status: DISCONTINUED | OUTPATIENT
Start: 2023-01-05 | End: 2023-01-07 | Stop reason: HOSPADM

## 2023-01-05 RX ORDER — PROPOFOL 10 MG/ML
INJECTION, EMULSION INTRAVENOUS PRN
Status: DISCONTINUED | OUTPATIENT
Start: 2023-01-05 | End: 2023-01-05 | Stop reason: SDUPTHER

## 2023-01-05 RX ORDER — PHENYLEPHRINE HCL IN 0.9% NACL 1 MG/10 ML
SYRINGE (ML) INTRAVENOUS PRN
Status: DISCONTINUED | OUTPATIENT
Start: 2023-01-05 | End: 2023-01-05 | Stop reason: SDUPTHER

## 2023-01-05 RX ORDER — SODIUM CHLORIDE 0.9 % (FLUSH) 0.9 %
5-40 SYRINGE (ML) INJECTION PRN
Status: DISCONTINUED | OUTPATIENT
Start: 2023-01-05 | End: 2023-01-07 | Stop reason: HOSPADM

## 2023-01-05 RX ORDER — SODIUM CHLORIDE 0.9 % (FLUSH) 0.9 %
5-40 SYRINGE (ML) INJECTION EVERY 12 HOURS SCHEDULED
Status: DISCONTINUED | OUTPATIENT
Start: 2023-01-05 | End: 2023-01-05 | Stop reason: HOSPADM

## 2023-01-05 RX ORDER — LIDOCAINE HYDROCHLORIDE 20 MG/ML
INJECTION, SOLUTION EPIDURAL; INFILTRATION; INTRACAUDAL; PERINEURAL PRN
Status: DISCONTINUED | OUTPATIENT
Start: 2023-01-05 | End: 2023-01-05 | Stop reason: SDUPTHER

## 2023-01-05 RX ORDER — SODIUM CHLORIDE 0.9 % (FLUSH) 0.9 %
5-40 SYRINGE (ML) INJECTION PRN
Status: DISCONTINUED | OUTPATIENT
Start: 2023-01-05 | End: 2023-01-05 | Stop reason: HOSPADM

## 2023-01-05 RX ORDER — EPHEDRINE SULFATE 50 MG/ML
INJECTION INTRAVENOUS
Status: COMPLETED
Start: 2023-01-05 | End: 2023-01-05

## 2023-01-05 RX ORDER — PREDNISONE 20 MG/1
40 TABLET ORAL DAILY
Status: DISCONTINUED | OUTPATIENT
Start: 2023-01-05 | End: 2023-01-07 | Stop reason: HOSPADM

## 2023-01-05 RX ADMIN — EPHEDRINE SULFATE 20 MG: 50 INJECTION, SOLUTION INTRAVENOUS at 11:25

## 2023-01-05 RX ADMIN — LIDOCAINE HYDROCHLORIDE 3 ML: 20 INJECTION, SOLUTION EPIDURAL; INFILTRATION; INTRACAUDAL; PERINEURAL at 10:17

## 2023-01-05 RX ADMIN — PROPOFOL 10 MG: 10 INJECTION, EMULSION INTRAVENOUS at 10:21

## 2023-01-05 RX ADMIN — PROPOFOL 10 MG: 10 INJECTION, EMULSION INTRAVENOUS at 10:27

## 2023-01-05 RX ADMIN — EPHEDRINE SULFATE 20 MG: 50 INJECTION INTRAMUSCULAR; INTRAVENOUS; SUBCUTANEOUS at 11:26

## 2023-01-05 RX ADMIN — PREDNISONE 40 MG: 20 TABLET ORAL at 18:32

## 2023-01-05 RX ADMIN — PROPOFOL 10 MG: 10 INJECTION, EMULSION INTRAVENOUS at 10:29

## 2023-01-05 RX ADMIN — PROPOFOL 10 MG: 10 INJECTION, EMULSION INTRAVENOUS at 10:25

## 2023-01-05 RX ADMIN — PROPOFOL 50 MG: 10 INJECTION, EMULSION INTRAVENOUS at 10:17

## 2023-01-05 RX ADMIN — PANTOPRAZOLE SODIUM 40 MG: 40 TABLET, DELAYED RELEASE ORAL at 18:32

## 2023-01-05 RX ADMIN — IRON SUCROSE 200 MG: 20 INJECTION, SOLUTION INTRAVENOUS at 15:52

## 2023-01-05 RX ADMIN — SODIUM CHLORIDE, SODIUM LACTATE, POTASSIUM CHLORIDE, AND CALCIUM CHLORIDE: .6; .31; .03; .02 INJECTION, SOLUTION INTRAVENOUS at 10:09

## 2023-01-05 RX ADMIN — MESALAMINE 800 MG: 400 CAPSULE, DELAYED RELEASE ORAL at 15:55

## 2023-01-05 RX ADMIN — PROPOFOL 10 MG: 10 INJECTION, EMULSION INTRAVENOUS at 10:19

## 2023-01-05 RX ADMIN — MESALAMINE 800 MG: 400 CAPSULE, DELAYED RELEASE ORAL at 20:07

## 2023-01-05 RX ADMIN — PROPOFOL 10 MG: 10 INJECTION, EMULSION INTRAVENOUS at 10:23

## 2023-01-05 RX ADMIN — Medication 100 MCG: at 10:24

## 2023-01-05 ASSESSMENT — PAIN SCALES - GENERAL
PAINLEVEL_OUTOF10: 0
PAINLEVEL_OUTOF10: 0

## 2023-01-05 NOTE — ANESTHESIA PRE PROCEDURE
Department of Anesthesiology  Preprocedure Note       Name:  Ariela Tse   Age:  27 y.o.  :  1990                                          MRN:  [id number]         Date:  2023      Surgeon: Mehrdad Odonnell):  Farooq Trevino MD    Procedure: Procedure(s):  EGD DIAGNOSTIC ONLY  COLONOSCOPY DIAGNOSTIC    Medications prior to admission:   Prior to Admission medications    Medication Sig Start Date End Date Taking?  Authorizing Provider   ferrous sulfate (IRON 325) 325 (65 Fe) MG tablet Take 1 tablet by mouth 2 times daily 10/6/20  Yes Annabelle Church APRN - CNP   loperamide (IMODIUM) 2 MG capsule Take 2 mg by mouth as needed for Diarrhea   Yes Historical Provider, MD   Aspirin-Acetaminophen-Caffeine (EXCEDRIN PO) Take by mouth 2 times daily   Yes Historical Provider, MD       Current medications:    Current Facility-Administered Medications   Medication Dose Route Frequency Provider Last Rate Last Admin    sodium chloride flush 0.9 % injection 5-40 mL  5-40 mL IntraVENous 2 times per day Tavo Beth MD        sodium chloride flush 0.9 % injection 5-40 mL  5-40 mL IntraVENous PRN Tavo Beth MD        0.9 % sodium chloride infusion   IntraVENous PRN Tavo Beth MD        iron sucrose (VENOFER) 200 mg in sodium chloride 0.9 % 100 mL IVPB  200 mg IntraVENous Q24H KATTY Acosta - CNP   Stopped at 23 1558    dextrose 5 % and 0.45 % sodium chloride infusion   IntraVENous Continuous Carmin Lesch, MD 75 mL/hr at 23 New Bag at 23    0.9 % sodium chloride infusion   IntraVENous PRN Carmin Lesch, MD        dicyclomine (BENTYL) injection 10 mg  10 mg IntraMUSCular Once Tiki Schwartz APRN - CNP        sodium chloride flush 0.9 % injection 5-40 mL  5-40 mL IntraVENous 2 times per day Carmin Lesch, MD        sodium chloride flush 0.9 % injection 5-40 mL  5-40 mL IntraVENous PRN Carmin Lesch, MD        0.9 % sodium chloride infusion IntraVENous PRMELISSA Romero MD        ondansetron (ZOFRAN-ODT) disintegrating tablet 4 mg  4 mg Oral Q8H PRMELISSA Romero MD        Or    ondansetron [facility]) injection 4 mg  4 mg IntraVENous Q6H PRMELISSA Romero MD        polyethylene glycol [facility]) packet 17 g  17 g Oral Daily PRMELISSA Romero MD        acetaminophen (TYLENOL) tablet 650 mg  650 mg Oral Q6H PRMELISSA Romero MD        Or    acetaminophen (TYLENOL) suppository 650 mg  650 mg Rectal Q6H PRMELISSA Romero MD           Allergies:  No Known Allergies    Problem List:    Patient Active Problem List   Diagnosis Code    Discoloration of skin of foot L81.9    Eczema L30.9    Iron deficiency anemia D50.9    Weight loss R63.4    Diarrhea R19.7    Lower abdominal pain R10.30    Ulcerative pancolitis with rectal bleeding ([address] K51.011    Colitis K52.9    Severe malnutrition ([facility] [address] E43       Past Medical History:        Diagnosis Date    Pain in upper jaw 1/14/2013       Past Surgical History:        Procedure Laterality Date    COLONOSCOPY N/A 1/25/2019    EGD AND COLONOSCOPY WITH ANESTHESIA performed by Michael Carbajal MD at [place] [address]      TYMPANOSTOMY TUBE PLACEMENT      X 2    UPPER GASTROINTESTINAL ENDOSCOPY N/A 1/25/2019    EGD AND COLONOSCOPY WITH ANESTHESIA performed by Michael Carbajal MD at [address] [place] [address] History:    Social History     Tobacco Use    Smoking status: Never    Smokeless tobacco: Never   Substance Use Topics    Alcohol use:  No                                Counseling given: Not Answered      Vital Signs (Current):   Vitals:    01/04/23 1632 01/04/23 1953 01/04/23 2302 01/05/23 0628   BP: 98/68 (!) 141/68 108/69 104/60   Pulse: 53 66 66    Resp: 18 18 20 16   Temp: 97.5 °F (36.4 °C) 97.4 °F (36.3 °C) 97.5 °F (36.4 °C) 97.5 °F (36.4 °C)   TempSrc: Oral Oral Oral Oral   SpO2: 98% 96% 100% 100%   Weight:       Height: BP Readings from Last 3 Encounters:   01/05/23 104/60   10/19/20 107/65   10/05/20 116/78       NPO Status: Time of last liquid consumption: 0020                        Time of last solid consumption: 2000                        Date of last liquid consumption: 01/05/23                        Date of last solid food consumption: 01/04/23    BMI:   Wt Readings from Last 3 Encounters:   01/02/23 76 lb 4.5 oz (34.6 kg)   10/05/20 105 lb (47.6 kg)   12/04/19 113 lb (51.3 kg)     Body mass index is 13.95 kg/m². CBC:   Lab Results   Component Value Date/Time    WBC 9.4 01/04/2023 07:37 AM    RBC 4.98 01/04/2023 07:37 AM    HGB 10.5 01/04/2023 07:37 AM    HCT 34.4 01/04/2023 07:37 AM    MCV 69.0 01/04/2023 07:37 AM    RDW 29.1 01/04/2023 07:37 AM     01/04/2023 07:37 AM       CMP:   Lab Results   Component Value Date/Time     01/04/2023 07:37 AM    K 3.5 01/04/2023 07:37 AM    K 5.1 01/02/2023 11:31 AM     01/04/2023 07:37 AM    CO2 20 01/04/2023 07:37 AM    BUN 20 01/04/2023 07:37 AM    CREATININE 1.7 01/04/2023 07:37 AM    GFRAA >60 10/05/2020 02:05 PM    AGRATIO 0.5 01/02/2023 11:31 AM    LABGLOM 54 01/04/2023 07:37 AM    GLUCOSE 87 01/04/2023 07:37 AM    PROT 7.9 01/02/2023 11:31 AM    CALCIUM 8.1 01/04/2023 07:37 AM    BILITOT <0.2 01/02/2023 11:31 AM    ALKPHOS 114 01/02/2023 11:31 AM    AST 26 01/02/2023 11:31 AM    ALT 15 01/02/2023 11:31 AM       POC Tests: No results for input(s): POCGLU, POCNA, POCK, POCCL, POCBUN, POCHEMO, POCHCT in the last 72 hours.     Coags: No results found for: PROTIME, INR, APTT    HCG (If Applicable): No results found for: PREGTESTUR, PREGSERUM, HCG, HCGQUANT     ABGs: No results found for: PHART, PO2ART, WHM8MRG, JBY1UIK, BEART, R2SFJPTV     Type & Screen (If Applicable):  No results found for: LABABO, LABRH    Drug/Infectious Status (If Applicable):  No results found for: HIV, HEPCAB    COVID-19 Screening (If Applicable): No results found for: COVID19        Anesthesia Evaluation  Patient summary reviewed and Nursing notes reviewed no history of anesthetic complications:   Airway: Mallampati: III     Neck ROM: full     Dental:          Pulmonary:Negative Pulmonary ROS and normal exam                               Cardiovascular:Negative CV ROS                      Neuro/Psych:   Negative Neuro/Psych ROS  (+) neuromuscular disease:,             GI/Hepatic/Renal: Neg GI/Hepatic/Renal ROS  (+) PUD,      (-) hiatal hernia and GERD       Endo/Other: Negative Endo/Other ROS                    Abdominal:             Vascular: Other Findings:           Anesthesia Plan      general     ASA 3     (I discussed with the patient the risks and benefits of PIV, general anesthesia, IV Narcotics, PACU. All questions were answered the patient agrees with the plan and wishes to proceed.  )  Induction: intravenous. Pre-Operative Diagnosis: Colitis [K52.9]; Weight loss [R63.4]; TASHI (acute kidney injury) ([facility] [address] [N17.9]; History of ulcerative colitis [Z87.19]; Thrombocytosis, unspecified [D75.839]    27 y.o.   BMI:  Body mass index is 13.95 kg/m².      Vitals:    01/04/23 1953 01/04/23 2302 01/05/23 0628 01/05/23 0730   BP: (!) 141/68 108/69 104/60 (!) 101/59   Pulse: 66 66  78   Resp: 18 20 16 16   Temp: 97.4 °F (36.3 °C) 97.5 °F (36.4 °C) 97.5 °F (36.4 °C) 97.8 °F (36.6 °C)   TempSrc: Oral Oral Oral Oral   SpO2: 96% 100% 100% 100%   Weight:       Height:           No Known Allergies    Social History     Tobacco Use    Smoking status: Never    Smokeless tobacco: Never   Substance Use Topics    Alcohol use: No       LABS:    CBC  Lab Results   Component Value Date/Time    WBC 6.5 01/05/2023 06:51 AM    HGB 10.5 (L) 01/05/2023 06:51 AM    HCT 33.7 (L) 01/05/2023 06:51 AM     (H) 01/05/2023 06:51 AM     RENAL  Lab Results   Component Value Date/Time     (L) 01/05/2023 06:51 AM    K 3.2 (L) 01/05/2023 06:51 AM    K 5.1 01/02/2023 11:31 AM     01/05/2023 06:51 AM    CO2 20 (L) 01/05/2023 06:51 AM    BUN 13 01/05/2023 06:51 AM    CREATININE 1.5 (H) 01/05/2023 06:51 AM    GLUCOSE 86 01/05/2023 06:51 AM     COAGS  No results found for: PROTIME, INR, APTT      Dustin Marsh MD   1/5/2023

## 2023-01-05 NOTE — PROGRESS NOTES
Pt assessment completed and charted. VSS. Pt a/o x4. Pt drinking GoLYELY. Pt is standby to bathroom. AVASYS in place. Bed in lowest position and wheels locked. Call light within reach. Bedside table within reach. Non-skid footwear in place. Pt denies any other needs at this time. Pt calls out appropriately.  Will continue to monit

## 2023-01-05 NOTE — PROGRESS NOTES
Hospitalist Progress Note      PCP: Geoff Pandey MD    Date of Admission: 1/2/2023    Chief Complaint: Diarrhea and weight loss    Subjective: no new c/o. Medications:  Reviewed    Infusion Medications    sodium chloride      dextrose 5 % and 0.45 % NaCl 75 mL/hr at 01/04/23 2001    sodium chloride      sodium chloride       Scheduled Medications    sodium chloride flush  5-40 mL IntraVENous 2 times per day    iron sucrose (VENOFER) iv piggyback 100 mL (Admin over 60 minutes)  200 mg IntraVENous Q24H    dicyclomine  10 mg IntraMUSCular Once    sodium chloride flush  5-40 mL IntraVENous 2 times per day     PRN Meds: sodium chloride flush, sodium chloride, sodium chloride, sodium chloride flush, sodium chloride, ondansetron **OR** ondansetron, polyethylene glycol, acetaminophen **OR** acetaminophen    No intake or output data in the 24 hours ending 01/05/23 1002      Physical Exam Performed:    BP (!) 101/59   Pulse 78   Temp 97.8 °F (36.6 °C) (Oral)   Resp 16   Ht 5' 2\" (1.575 m)   Wt 76 lb 4.5 oz (34.6 kg)   SpO2 100%   BMI 13.95 kg/m²     General appearance: No apparent distress, appears stated age and cooperative. HEENT: Pupils equal, round, and reactive to light. Conjunctivae/corneas clear. Neck: Supple, with full range of motion. No jugular venous distention. Trachea midline. Respiratory:  Normal respiratory effort. Clear to auscultation, bilaterally without Rales/Wheezes/Rhonchi. Cardiovascular: Regular rate and rhythm with normal S1/S2 without murmurs, rubs or gallops. Abdomen: Soft, non-tender, non-distended with normal bowel sounds. Musculoskeletal: No clubbing, cyanosis or edema bilaterally. Full range of motion without deformity. Skin: Skin color, texture, turgor normal.  No rashes or lesions. Neurologic:  Neurovascularly intact without any focal sensory/motor deficits.  Cranial nerves: II-XII intact, grossly non-focal.  Psychiatric: Alert and oriented, thought content appropriate, normal insight  Capillary Refill: Brisk,< 3 seconds   Peripheral Pulses: +2 palpable, equal bilaterally       Labs:   Recent Labs     01/03/23  1026 01/03/23 2008 01/04/23  0737 01/05/23  0651   WBC 6.5  --  9.4 6.5   HGB 5.5* 10.7* 10.5* 10.5*   HCT 18.9* 35.2* 34.4* 33.7*   *  --  544* 478*       Recent Labs     01/03/23  1026 01/04/23  0737 01/05/23  0651    134* 132*   K 3.6 3.5 3.2*    104 101   CO2 18* 20* 20*   BUN 31* 20 13   CREATININE 2.0* 1.7* 1.5*   CALCIUM 7.8* 8.1* 8.2*   PHOS 3.4 2.4* 2.2*       Recent Labs     01/02/23  1131   AST 26   ALT 15   BILITOT <0.2   ALKPHOS 114       No results for input(s): INR in the last 72 hours. No results for input(s): Diana Macleod in the last 72 hours. Urinalysis:    No results found for: NITRU, WBCUA, BACTERIA, RBCUA, BLOODU, SPECGRAV, GLUCOSEU    Consults:    IP CONSULT TO GI      Assessment/Plan:    Active Hospital Problems    Diagnosis     Severe malnutrition ([facility] [address] [E43]      Priority: Medium    Colitis [K52.9]      Priority: Medium         Colitis - likely inflammatory bowel disease of unclear primary diagnosis. Long-standing in need of definitive dx and tx plan. GI consulted and appreciated w/ plan for EGD/Colon Thurs 5 Jan.      ARF - w/ elevated BUN/Cr ratio c/w pre-renal azotemia. Given IVF hydration and follow serial labs - improving. Reviewed and documented as above. Now on Maintenance IVF continuously. HypoNatremia - etiology clinically unable to determine but likely hypovolemic. Follow serial labs on IVF. Reviewed and documented as above. Anemia - etiology clinically unable to determine, w/out evidence of active bleeding/hemolysis - likely 2nd to malabsorbtion of Iron. Clinically stable but decreased w/ hydration and now transfused 2 units PRBCs 3 Jan. Follow serial labs. Reviewed and documented as above.         DVT Prophylaxis: IPC    Recent Labs     01/03/23  1026 01/04/23  0737 01/05/23  7589 * 544* 478*       Diet: Diet NPO Exceptions are: Sips of Water with Meds  Code Status: Full Code      PT/OT Eval Status: ordered and pending.       Dispo - Patient is likely to remain in-house at least until Sat/Sunday 7/8 Jan pending clinical course and PT/OT eval/recs        Lisa Sweet MD

## 2023-01-05 NOTE — ANESTHESIA POSTPROCEDURE EVALUATION
Department of Anesthesiology  Postprocedure Note    Patient: Char Tony  MRN: [id number]  YOB: 1990  Date of evaluation: 1/5/2023      Procedure Summary     Date: 01/05/23 Room / Location: [facility] [address] [facility] 01 / [facility]    Anesthesia Start: 1010 Anesthesia Stop: 4417    Procedures:       EGD BIOPSY      COLONOSCOPY WITH BIOPSY Diagnosis:       Colitis      Anemia, unspecified type      (anemia)      (colitis)    Surgeons: Marshall Smith MD Responsible Provider: Adeline Thompson MD    Anesthesia Type: general ASA Status: 3          Anesthesia Type: No value filed.     Matilde Phase I: Matilde Score: 10    Matilde Phase II: Matilde Score: 8      Anesthesia Post Evaluation    Comments: Postoperative Anesthesia Note    Name:    Char Tony  MRN:      [id number]    Patient Vitals in the past 12 hrs:  01/05/23 1143, BP:(!) 95/48, Pulse:58, Resp:14, SpO2:100 %  01/05/23 1133, BP:97/61, Pulse:56, Resp:14, SpO2:100 %  01/05/23 1117, BP:(!) 82/42, Pulse:68, Resp:14, SpO2:100 %  01/05/23 1107, BP:(!) 79/40, Pulse:67, Resp:14, SpO2:100 %  01/05/23 1057, BP:(!) 91/50, Pulse:57, Resp:14, SpO2:100 %  01/05/23 1047, BP:(!) 96/52, Pulse:80, Resp:14, SpO2:99 %  01/05/23 1037, BP:(!) 88/45, Pulse:59, Resp:14, SpO2:98 %  01/05/23 0730, BP:(!) 101/59, Temp:97.8 °F (36.6 °C), Temp src:Oral, Pulse:78, Resp:16, SpO2:100 %  01/05/23 0628, BP:104/60, Temp:97.5 °F (36.4 °C), Temp src:Oral, Resp:16, SpO2:100 %     LABS:    CBC  Lab Results       Component                Value               Date/Time                  WBC                      6.5                 01/05/2023 06:51 AM        HGB                      10.5 (L)            01/05/2023 06:51 AM        HCT                      33.7 (L)            01/05/2023 06:51 AM        PLT                      478 (H)             01/05/2023 06:51 AM   RENAL  Lab Results       Component                Value               Date/Time                  NA 132 (L)             01/05/2023 06:51 AM        K                        3.2 (L)             01/05/2023 06:51 AM        K                        5.1                 01/02/2023 11:31 AM        CL                       101                 01/05/2023 06:51 AM        CO2                      20 (L)              01/05/2023 06:51 AM        BUN                      13                  01/05/2023 06:51 AM        CREATININE               1.5 (H)             01/05/2023 06:51 AM        GLUCOSE                  86                  01/05/2023 06:51 AM   COAGS  No results found for: PROTIME, INR, APTT    Intake & Output:  @14KFUH@    Nausea & Vomiting:  No    Level of Consciousness:  Awake    Pain Assessment:  Adequate analgesia    Anesthesia Complications:  No apparent anesthetic complications    SUMMARY      Vital signs stable  OK to discharge from Stage I post anesthesia care.   Care transferred from Anesthesiology department on discharge from perioperative area

## 2023-01-05 NOTE — H&P
Pre-sedation Assessment    History and Physical / Pre-Sedation Assessment  Patient:  Geoffrey Mahan   :   1990     Intended Procedure: EGD/CLS      HPI:   JACQUI. - transfused 2 units 1/3. Hgb stable since. No overt bleeding. Chronic diarrhea/ diffuse colitis. - Remote hx ulcerative colitis. Says stopped medications several years ago because they were making him feel worse - cannot recal what he was on. CRP 80. Celiac screen normal.  C. Diff negative. 4. Weight loss. 5. Malnutrition.      Plan:  EGD and colonoscopy     Current Facility-Administered Medications   Medication Dose Route Frequency Provider Last Rate Last Admin    sodium chloride flush 0.9 % injection 5-40 mL  5-40 mL IntraVENous 2 times per day Burke Perez MD        sodium chloride flush 0.9 % injection 5-40 mL  5-40 mL IntraVENous PRN Burke Perez MD        0.9 % sodium chloride infusion   IntraVENous PRN Burke Perez MD        iron sucrose (VENOFER) 200 mg in sodium chloride 0.9 % 100 mL IVPB  200 mg IntraVENous Q24H KATTY Cohen - CNP   Stopped at 23 1558    dextrose 5 % and 0.45 % sodium chloride infusion   IntraVENous Continuous Vear Patches, MD 75 mL/hr at 23 New Bag at 23    0.9 % sodium chloride infusion   IntraVENous PRN Reyna Hernández MD        dicyclomine (BENTYL) injection 10 mg  10 mg IntraMUSCular Once Tiki Schwartz APRN - CNP        sodium chloride flush 0.9 % injection 5-40 mL  5-40 mL IntraVENous 2 times per day Reyna Hernández MD        sodium chloride flush 0.9 % injection 5-40 mL  5-40 mL IntraVENous PRN Reyna Hernández MD        0.9 % sodium chloride infusion   IntraVENous PRN Reyna Hernández MD        ondansetron (ZOFRAN-ODT) disintegrating tablet 4 mg  4 mg Oral Q8H PRN Reyna Hernández MD        Or    ondansetron [facility]) injection 4 mg  4 mg IntraVENous Q6H PRN Mirlandear MD Betty        polyethylene glycol (GLYCOLAX) packet 17 g  17 g Oral Daily PRN Brianna Beltran MD        acetaminophen (TYLENOL) tablet 650 mg  650 mg Oral Q6H PRN Brianna Beltran MD        Or    acetaminophen (TYLENOL) suppository 650 mg  650 mg Rectal Q6H PRN Brianna Beltran MD         Past Medical History:   Diagnosis Date    Pain in upper jaw 1/14/2013     Past Surgical History:   Procedure Laterality Date    COLONOSCOPY N/A 1/25/2019    EGD AND COLONOSCOPY WITH ANESTHESIA performed by Nickie Wang MD at [address] [place] [address]      X 2    UPPER GASTROINTESTINAL ENDOSCOPY N/A 1/25/2019    EGD AND COLONOSCOPY WITH ANESTHESIA performed by Nickie Wang MD at [place] [address] notes reviewed and agreed. Medications reviewed  Allergies: No Known Allergies        Physical Exam:  Vital Signs: BP (!) 101/59   Pulse 78   Temp 97.8 °F (36.6 °C) (Oral)   Resp 16   Ht 5' 2\" (1.575 m)   Wt 76 lb 4.5 oz (34.6 kg)   SpO2 100%   BMI 13.95 kg/m²  Body mass index is 13.95 kg/m². Airway:Normal  Cardiac:Normal  Pulmonary:Normal  Abdomen:Normal  Specific to procedure: none      Pre-Procedure Assessment/Plan:  ASA 3 - Patient with moderate systemic disease with functional limitations  Mallampati I  Level of Sedation Plan:Deep sedation    Post Procedure plan: Return to same level of care    I assessed the patient and find that the patient is in satisfactory condition to proceed with the planned procedure and sedation plan. I have explained the risk, benefits, and alternatives to the procedure. The patient understands and agrees to proceed.   Yes    Elodia Bethea MD       (O) [phone]        Elodia Bethea MD  10:15 AM 1/5/2023

## 2023-01-05 NOTE — PROGRESS NOTES
Discussed with Mohan , QUETA with GI that patient is refusing protonix and decadron. States dad took these medications in the past for his colitis and that it caused him to need a colectomy. RN attempted to explain that he is getting this medications d/t the findings of the EGD/Colonoscopy but patient still does not want them at this time. Mohan  states she will see patient and discuss.

## 2023-01-05 NOTE — OP NOTE
Esophagogastroduodenoscopy & Colonoscopy Note  Patient:   Clemencia Nam    YOB: 1990    Facility:   [facility] [Inpatient]   Referring/PCP: Uzma Rose MD  Procedure:   Esophagogastroduodenoscopy  --diagnostic     Colonoscopy --diagnostic;  Date:     1/5/2023  Endoscopist:  Samira Balbuena MD, MD    Preoperative Diagnosis:  Fe def anemia, chronic diarrhea/?colitis and severe malnutrition/wt loss    Postoperative Diagnosis: Ulcerative esophagitis and severe ulcerative pan-colitis    Anesthesia:  MAC    Estimated blood loss: Minimal    Complications: None    Description of Procedure:  Informed consent was obtained from the patient after explanation of the procedure including indications, description of the procedure,  benefits and possible risks and complications of the procedure, and alternatives. Questions were answered. The patient's history was reviewed and a directed physical examination was performed prior to the procedure. Patient was monitored throughout the procedure with pulse oximetry and periodic assessment of vital signs. Patient was sedated as noted above. The Nursing staff and I performed a time out. With the patient in the left lateral decubitus position, the Olympus videoendoscope was placed in the patient's mouth and under direct visualization passed into the esophagus. The scope was ultimately passed to the third portion of the duodenum. Visualization was performed during both introduction and withdrawal of the endoscope and retroflexed view of the proximal stomach was obtained. Findings[de-identified]   Esophagus: Severe ulcerative esophagitis distally, GERD-related. The findings do not support a diagnosis of Fernandez's Esophagus.   Stomach: normal  Duodenum: normal, biopsied to R/O Celiac    With the patient initially in the left lateral decubitus position, a digital rectal examination was performed and revealed negative without mass, lesions or tenderness. The Olympus video colonoscope was placed in the patient's rectum and advanced without difficulty  to the terminal ileum. Photographs were taken. The prep was good. Examination of the mucosa was performed during both introduction and withdrawal of the colonoscope. Retroflexed view of the rectum was performed. Withdrawal time was 7 minutes. Findings:   1. Normal TI, biopsied  2.  Severe ulcerative pan-colitis, biopsied     Recommendations:    Await pathology, but will immediately start Prednisone 40 mg qd and Mesalamine 800 mg tid, as well as bid Protonix     Rashard Caballero MD       (O) [phone]          Rashard Caballero MD

## 2023-01-05 NOTE — PROGRESS NOTES
Dr. Chiqui Crump notified of patient's loss of IV access. Awaiting reply regarding possibility of PICC placement.

## 2023-01-05 NOTE — PROGRESS NOTES
Dr Brianna Sanabria attempted ultrasound iv without success-  Pt refusing another attempt- DR knapp discussed options with pt

## 2023-01-05 NOTE — PROGRESS NOTES
Patient's IV found leaking. IV removed. 2 separate RN's attempted to place new IV but were unsuccessful. Anesthesia had attempted earlier in the day with no success. Notified Dr. Andrew Schumacher.

## 2023-01-05 NOTE — PLAN OF CARE
Problem: Safety - Adult  Goal: Free from fall injury  Outcome: Progressing     Problem: Nutrition Deficit:  Goal: Optimize nutritional status  Outcome: Not Progressing     Problem: Pain  Goal: Verbalizes/displays adequate comfort level or baseline comfort level  Outcome: Progressing     Problem: Nutrition Deficit:  Goal: Optimize nutritional status  Outcome: Not Progressing

## 2023-01-06 LAB
ALBUMIN SERPL-MCNC: 2.1 G/DL (ref 3.4–5)
ANION GAP SERPL CALCULATED.3IONS-SCNC: 12 MMOL/L (ref 3–16)
BUN BLDV-MCNC: 12 MG/DL (ref 7–20)
CALCIUM SERPL-MCNC: 8.3 MG/DL (ref 8.3–10.6)
CHLORIDE BLD-SCNC: 103 MMOL/L (ref 99–110)
CO2: 19 MMOL/L (ref 21–32)
CREAT SERPL-MCNC: 1.4 MG/DL (ref 0.9–1.3)
GFR SERPL CREATININE-BSD FRML MDRD: >60 ML/MIN/{1.73_M2}
GLUCOSE BLD-MCNC: 137 MG/DL (ref 70–99)
HCT VFR BLD CALC: 33.1 % (ref 40.5–52.5)
HEMATOLOGY PATH CONSULT: NO
HEMOGLOBIN: 10.2 G/DL (ref 13.5–17.5)
MCH RBC QN AUTO: 21.1 PG (ref 26–34)
MCHC RBC AUTO-ENTMCNC: 30.7 G/DL (ref 31–36)
MCV RBC AUTO: 68.6 FL (ref 80–100)
PDW BLD-RTO: 29.2 % (ref 12.4–15.4)
PHOSPHORUS: 4 MG/DL (ref 2.5–4.9)
PLATELET # BLD: 393 K/UL (ref 135–450)
PMV BLD AUTO: 8.1 FL (ref 5–10.5)
POTASSIUM SERPL-SCNC: 3.7 MMOL/L (ref 3.5–5.1)
RBC # BLD: 4.82 M/UL (ref 4.2–5.9)
SODIUM BLD-SCNC: 134 MMOL/L (ref 136–145)
WBC # BLD: 5.1 K/UL (ref 4–11)

## 2023-01-06 PROCEDURE — 85027 COMPLETE CBC AUTOMATED: CPT

## 2023-01-06 PROCEDURE — 6370000000 HC RX 637 (ALT 250 FOR IP): Performed by: INTERNAL MEDICINE

## 2023-01-06 PROCEDURE — 80069 RENAL FUNCTION PANEL: CPT

## 2023-01-06 PROCEDURE — 6370000000 HC RX 637 (ALT 250 FOR IP): Performed by: NURSE PRACTITIONER

## 2023-01-06 PROCEDURE — 36415 COLL VENOUS BLD VENIPUNCTURE: CPT

## 2023-01-06 PROCEDURE — 1200000000 HC SEMI PRIVATE

## 2023-01-06 RX ORDER — FERROUS SULFATE 325(65) MG
325 TABLET ORAL 2 TIMES DAILY WITH MEALS
Status: DISCONTINUED | OUTPATIENT
Start: 2023-01-06 | End: 2023-01-07 | Stop reason: HOSPADM

## 2023-01-06 RX ORDER — MESALAMINE 400 MG/1
1600 CAPSULE, DELAYED RELEASE ORAL 3 TIMES DAILY
Status: DISCONTINUED | OUTPATIENT
Start: 2023-01-06 | End: 2023-01-07 | Stop reason: HOSPADM

## 2023-01-06 RX ADMIN — PANTOPRAZOLE SODIUM 40 MG: 40 TABLET, DELAYED RELEASE ORAL at 05:52

## 2023-01-06 RX ADMIN — FERROUS SULFATE TAB 325 MG (65 MG ELEMENTAL FE) 325 MG: 325 (65 FE) TAB at 20:00

## 2023-01-06 RX ADMIN — MESALAMINE 800 MG: 400 CAPSULE, DELAYED RELEASE ORAL at 10:05

## 2023-01-06 RX ADMIN — PANTOPRAZOLE SODIUM 40 MG: 40 TABLET, DELAYED RELEASE ORAL at 20:00

## 2023-01-06 RX ADMIN — MESALAMINE 1600 MG: 400 CAPSULE, DELAYED RELEASE ORAL at 20:00

## 2023-01-06 RX ADMIN — PREDNISONE 40 MG: 20 TABLET ORAL at 10:05

## 2023-01-06 NOTE — PROGRESS NOTES
Order for PICC line per Dr. Samantha Messer. Pre procedure and timeout done with pt's RN. Patient evaluated for PICC line. Upon evaluation patient did not have appropriate vessel size for PICC/ML procedure. Recommendation: recommend IR referral for central line placement.        Angela RN given handoff report

## 2023-01-06 NOTE — PROGRESS NOTES
Hospitalist Progress Note      PCP: Juliano Torrez MD    Date of Admission: 1/2/2023    Chief Complaint: Diarrhea and weight loss    Subjective: no new c/o. Medications:  Reviewed    Infusion Medications    sodium chloride      dextrose 5 % and 0.45 % NaCl Stopped (01/05/23 1631)    sodium chloride      sodium chloride       Scheduled Medications    predniSONE  40 mg Oral Daily    mesalamine  800 mg Oral TID    pantoprazole  40 mg Oral BID AC    lidocaine 1 % injection  5 mL IntraDERmal Once    sodium chloride flush  5-40 mL IntraVENous 2 times per day    iron sucrose (VENOFER) iv piggyback 100 mL (Admin over 60 minutes)  200 mg IntraVENous Q24H    dicyclomine  10 mg IntraMUSCular Once    sodium chloride flush  5-40 mL IntraVENous 2 times per day     PRN Meds: sodium chloride flush, sodium chloride, sodium chloride, sodium chloride flush, sodium chloride, ondansetron **OR** ondansetron, polyethylene glycol, acetaminophen **OR** acetaminophen      Intake/Output Summary (Last 24 hours) at 1/6/2023 0929  Last data filed at 1/5/2023 1843  Gross per 24 hour   Intake 840 ml   Output --   Net 840 ml         Physical Exam Performed:    BP 97/65   Pulse 89   Temp 97.5 °F (36.4 °C) (Oral)   Resp 16   Ht 5' 2\" (1.575 m)   Wt 76 lb 4.5 oz (34.6 kg)   SpO2 99%   BMI 13.95 kg/m²     General appearance: No apparent distress, appears stated age and cooperative. HEENT: Pupils equal, round, and reactive to light. Conjunctivae/corneas clear. Neck: Supple, with full range of motion. No jugular venous distention. Trachea midline. Respiratory:  Normal respiratory effort. Clear to auscultation, bilaterally without Rales/Wheezes/Rhonchi. Cardiovascular: Regular rate and rhythm with normal S1/S2 without murmurs, rubs or gallops. Abdomen: Soft, non-tender, non-distended with normal bowel sounds. Musculoskeletal: No clubbing, cyanosis or edema bilaterally. Full range of motion without deformity.   Skin: Skin color, texture, turgor normal.  No rashes or lesions. Neurologic:  Neurovascularly intact without any focal sensory/motor deficits. Cranial nerves: II-XII intact, grossly non-focal.  Psychiatric: Alert and oriented, thought content appropriate, normal insight  Capillary Refill: Brisk,< 3 seconds   Peripheral Pulses: +2 palpable, equal bilaterally       Labs:   Recent Labs     01/04/23  0737 01/05/23  0651 01/06/23  0750   WBC 9.4 6.5 5.1   HGB 10.5* 10.5* 10.2*   HCT 34.4* 33.7* 33.1*   * 478* 393       Recent Labs     01/04/23  0737 01/05/23  0651 01/06/23  0750   * 132* 134*   K 3.5 3.2* 3.7    101 103   CO2 20* 20* 19*   BUN 20 13 12   CREATININE 1.7* 1.5* 1.4*   CALCIUM 8.1* 8.2* 8.3   PHOS 2.4* 2.2* 4.0       No results for input(s): AST, ALT, BILIDIR, BILITOT, ALKPHOS in the last 72 hours. No results for input(s): INR in the last 72 hours. No results for input(s): Aleene Sauger in the last 72 hours. Urinalysis:    No results found for: Janeth Feeling, BACTERIA, RBCUA, BLOODU, SPECGRAV, GLUCOSEU    Consults:    IP CONSULT TO GI  IP CONSULT TO INTERVENTIONAL RADIOLOGY      Assessment/Plan:    Active Hospital Problems    Diagnosis     Severe malnutrition ([facility] [address] [E43]      Priority: Medium    Colitis [K52.9]      Priority: Medium       Colitis - likely inflammatory bowel disease of unclear primary diagnosis. Long-standing in need of definitive dx and tx plan. GI consulted and appreciated s/p EGD/Colon Thurs 5 Chandan - Started on Steroids/Mesalamine post-procedure 5 Chandan awaiting pathology. ARF - w/ elevated BUN/Cr ratio c/w pre-renal azotemia. Given IVF hydration and follow serial labs - improving. Reviewed and documented as above. Now on Maintenance IVF continuously. HypoNatremia - etiology clinically unable to determine but likely hypovolemic. Follow serial labs on IVF. Reviewed and documented as above.      Anemia - etiology clinically unable to determine, w/out evidence of active bleeding/hemolysis - likely 2nd to malabsorbtion of Iron. Clinically stable but decreased w/ hydration and now transfused 2 units PRBCs 3 Jan. Follow serial labs. Reviewed and documented as above. Malnutrition - severe protein/calorie malnutrition. Likely 2nd to poor intake and GI malabsortion. Supplemental feeding/nutritional supplements as able. DVT Prophylaxis: IPC    Recent Labs     01/04/23  0737 01/05/23  0651 01/06/23  0750   * 478* 393       Diet: ADULT DIET; Regular  Code Status: Full Code      PT/OT Eval Status: ordered and pending.       Dispo - Patient is likely to remain in-house at least until Sat/Sunday 7/8 Jan pending clinical course and PT/OT eval/recs        Kristopher Cabrera MD

## 2023-01-06 NOTE — PROGRESS NOTES
Spoke to nurse about ir consult for PICC placement. DIT attempted yesterday no vessels big enough. IR would use the same the vessels and PICC equipment.  Unable to place in IR

## 2023-01-06 NOTE — PLAN OF CARE
Problem: Discharge Planning  Goal: Discharge to home or other facility with appropriate resources  Outcome: Progressing  Flowsheets (Taken 1/5/2023 2214)  Discharge to home or other facility with appropriate resources:   Identify barriers to discharge with patient and caregiver   Arrange for needed discharge resources and transportation as appropriate     Problem: Safety - Adult  Goal: Free from fall injury  1/5/2023 2214 by Pauline Luo RN  Outcome: Progressing  Flowsheets (Taken 1/4/2023 1016 by Gretchen Gurrola RN)  Free From Fall Injury: Instruct family/caregiver on patient safety     Problem: Nutrition Deficit:  Goal: Optimize nutritional status  1/5/2023 2214 by Pauline Luo RN  Outcome: Progressing  Flowsheets (Taken 1/5/2023 2214)  Nutrient intake appropriate for improving, restoring, or maintaining nutritional needs:   Assess nutritional status and recommend course of action   Monitor oral intake, labs, and treatment plans     Problem: Pain  Goal: Verbalizes/displays adequate comfort level or baseline comfort level  1/5/2023 2214 by Pauline Luo RN  Outcome: Progressing  Flowsheets (Taken 1/5/2023 2214)  Verbalizes/displays adequate comfort level or baseline comfort level: Encourage patient to monitor pain and request assistance     Problem: Nutrition Deficit:  Goal: Optimize nutritional status  1/5/2023 2214 by Pauline Luo RN  Outcome: Progressing  Flowsheets (Taken 1/5/2023 2214)  Nutrient intake appropriate for improving, restoring, or maintaining nutritional needs:   Assess nutritional status and recommend course of action   Monitor oral intake, labs, and treatment plans  1/5/2023 1723 by Angela Gilmore RN  Outcome: Not Progressing

## 2023-01-06 NOTE — PROGRESS NOTES
PROGRESS NOTE    HPI: Monica Tucker is a(n)32 y.o. male admitted for work-up and treatment for Colitis [K52.9]  Weight loss [R63.4]  TASHI (acute kidney injury) ([facility] [address] [N17.9]  History of ulcerative colitis [Z87.19]  Thrombocytosis, unspecified [D75.839]. We are following for colitis, JACQUI. Subjective:     Eating a little more today. Still feels weak. No bleeding. Objective:     I/O last 3 completed shifts: In: 840 [P.O.:240; I.V.:600]  Out: -       BP 97/65   Pulse 89   Temp 97.5 °F (36.4 °C) (Oral)   Resp 16   Ht 5' 2\" (1.575 m)   Wt 76 lb 4.5 oz (34.6 kg)   SpO2 99%   BMI 13.95 kg/m²     Physical Exam:  HEENT: anicteric sclera, oropharyngeal membranes pink and moist.  Cor: RRR  Lungs: non-labored, no respiratory distress  Abdomen: soft, NT. No ascites. No hepatomegaly or splenomegaly  Extremities: no edema, muscle wasting  Neuro: alert and oriented x 3      Results:   Lab Results   Component Value Date    ALT 15 01/02/2023    AST 26 01/02/2023    ALKPHOS 114 01/02/2023    PROT 7.9 01/02/2023    LABALBU 2.1 (L) 01/06/2023    LIPASE 45.0 01/02/2023     Lab Results   Component Value Date    WBC 5.1 01/06/2023    HGB 10.2 (L) 01/06/2023    HCT 33.1 (L) 01/06/2023    MCV 68.6 (L) 01/06/2023     01/06/2023     BUN/Cr/glu/ALT/AST/amyl/lip:  12/1.4/--/--/--/--/-- (01/06 0750)  CT ABDOMEN PELVIS W IV CONTRAST Additional Contrast? None    Result Date: 1/2/2023  Diffuse abnormal colonic wall thickening seen with surrounding injection of the pericolonic fat, likely secondary to the reported history of ulcerative colitis. No pericolonic abscess. Fatty liver and cholelithiasis Mild left inguinal adenopathy, likely reactive. Impression:  JACQUI. S/p EGD 1/5 with ulcerative esophagitis and severe pancolitis. - transfused 2 units 1/3. Hgb stable since. No overt bleeding. UC.  Repeat colonoscopy yesterday with chronic active colitis throughout the colon. TI bx was unremarkable. - He has a remote hx ulcerative colitis. Not on medications, self discontinued. 4. Weight loss. 5. Malnutrition. Plan:  Lost IV access. Switch Venofer to PO iron. 2. Continue PPI bid. 3. Mesalamine 1600 mg tid, prednisone 40 mg bid. 4. Diet as tolerated. Will need close follow-up upon discharge. Please do not hesitate to call with questions or concerns. Electronically signed by: KATTY Gilliam 1/6/2023 10:22 AM        Discussed with Matias Real.

## 2023-01-06 NOTE — CARE COORDINATION
Referred to patient for d/c planning. Spoke to patient. Patient lives at home with parents, reports he doesn't feel he can be d/c'd at this time as he needs to build up his strength. PT/OT eval complete and patient d/c'd. Attempted to call father and mother, messages left. Patient unable to report medications, if he has a PCP and if parents can assist on d/c. Electronically signed by ALVARO Reyna on 1/6/2023 at 9:56 AM     Received return call from father. He feels parents will be able to assist on d/c. Father aware of pending medicaid. Reports they may need assist with medications on d/c depending on cost.  Discussed [facility] also. Father denies other needs at this time.   Electronically signed by ALVARO Reyna on 1/6/2023 at 4:04 PM

## 2023-01-06 NOTE — PROGRESS NOTES
Patient loss of IV access, unable to get new access. PICC and IR unable to get access. Dr Andrew Schumacher aware.

## 2023-01-06 NOTE — PLAN OF CARE
Problem: Safety - Adult  Goal: Free from fall injury  Outcome: Progressing  Flowsheets (Taken 1/6/2023 1749)  Free From Fall Injury: Instruct family/caregiver on patient safety     Problem: Nutrition Deficit:  Goal: Optimize nutritional status  Outcome: Progressing  Flowsheets  Taken 1/6/2023 1749 by Laron Roa RN  Nutrient intake appropriate for improving, restoring, or maintaining nutritional needs:   Assess nutritional status and recommend course of action   Recommend appropriate diets, oral nutritional supplements, and vitamin/mineral supplements   Monitor oral intake, labs, and treatment plans   Order, calculate, and assess calorie counts as needed  Taken 1/6/2023 1148 by Randell Dee, MS, RD, LD  Nutrient intake appropriate for improving, restoring, or maintaining nutritional needs:   Assess nutritional status and recommend course of action   Monitor oral intake, labs, and treatment plans   Recommend appropriate diets, oral nutritional supplements, and vitamin/mineral supplements   Provide specific nutrition education to patient or family as appropriate

## 2023-01-06 NOTE — PROGRESS NOTES
Comprehensive Nutrition Assessment    Type and Reason for Visit:  Reassess    Nutrition Recommendations/Plan:   Continue general diet   Add Boost Pudding BID - monitor acceptance   Encourage PO intakes as tolerated   Monitor further diet education needs   Monitor nutrition adequacy, pertinent labs, bowel habits, wt changes, and clinical progress     Malnutrition Assessment:  Malnutrition Status:  Severe malnutrition (01/04/23 1206)    Context:  Chronic Illness     Findings of the 6 clinical characteristics of malnutrition:  Energy Intake:  75% or less estimated energy requirements for 1 month or longer  Body Fat Loss:  Severe body fat loss Orbital, Buccal region   Muscle Mass Loss:  Severe muscle mass loss Temples (temporalis), Clavicles (pectoralis & deltoids)    Nutrition Assessment:    Follow up: Pt remains nutritionally compromised AEB poor PO intakes. S/p EGD and colonoscopy with findings of ulcerative esophagitis and severe ulcerative pan colitis. Pt reports appetite slightly improving. Able to eat ~50% of breakfast today, but took pt about 1.5 hours. Encouraged PO intakes as tolerated. Agreeable to trial ONS, RD to add. Briefly discussed low fiber diet. Encouraged compliance to minimize GI symptoms. Pt not interested in discussing, handouts left at bedside. Will continue to monitor. Nutrition Related Findings:    BM x2 on 1/5. Active BS. +2 generalized and +1 BLE edema. Na 132. K 3.2. Phos 2.2. Wound Type: None       Current Nutrition Intake & Therapies:    Average Meal Intake: 51-75%, %  Average Supplements Intake: None Ordered  ADULT DIET; Regular    Anthropometric Measures:  Height: 5' 2\" (157.5 cm)  Ideal Body Weight (IBW): 118 lbs (54 kg)       Current Body Weight: 76 lb (34.5 kg), 64.4 % IBW.  Weight Source: Bed Scale  Current BMI (kg/m2): 13.9                          BMI Categories: Underweight (BMI less than 18.5)    Estimated Daily Nutrient Needs:  Energy Requirements Based On: Kcal/kg (30-35)  Weight Used for Energy Requirements: Ideal  Energy (kcal/day): 3831-0036 kcal  Weight Used for Protein Requirements: Ideal (1.2-1.5 g/kg)  Protein (g/day): 64-81 g  Method Used for Fluid Requirements: 1 ml/kcal  Fluid (ml/day): 6446-7133 mL    Nutrition Diagnosis:   Severe malnutrition related to inadequate protein-energy intake as evidenced by poor intake prior to admission, severe loss of subcutaneous fat, severe muscle loss    Nutrition Interventions:   Food and/or Nutrient Delivery: Continue Current Diet, Start Oral Nutrition Supplement  Nutrition Education/Counseling: Education initiated  Coordination of Nutrition Care: Continue to monitor while inpatient       Goals:     Goals: PO intake 50% or greater, prior to discharge       Nutrition Monitoring and Evaluation:   Behavioral-Environmental Outcomes: Readiness for Change, Beliefs and Attitutes  Food/Nutrient Intake Outcomes: Diet Advancement/Tolerance, Food and Nutrient Intake, Supplement Intake  Physical Signs/Symptoms Outcomes: Biochemical Data, GI Status, Meal Time Behavior, Diarrhea, Nutrition Focused Physical Findings, Weight    Nutrition Education:  Educated on low fiber  Learners: Patient  Readiness: Acceptance  Method: Explanation and Handout  Response: Needs Reinforcement  Contact name and number provided.     Discharge Planning:    Continue current diet, Continue Oral Nutrition Supplement     Pantera Pemberton, MS, RD, LD  Contact: [phone]

## 2023-01-06 NOTE — PROGRESS NOTES
Secure message sent to Andrewsergei Suzanne to let her know patient lost IV access today and PICC nurse was unable to gain access so an IR consult will be needed. Patient resting in bed with family at bedside, no concerns voiced at this time. Call  light in reach.

## 2023-01-07 VITALS
DIASTOLIC BLOOD PRESSURE: 64 MMHG | OXYGEN SATURATION: 100 % | SYSTOLIC BLOOD PRESSURE: 101 MMHG | RESPIRATION RATE: 16 BRPM | TEMPERATURE: 97.4 F | WEIGHT: 76.28 LBS | HEIGHT: 62 IN | HEART RATE: 60 BPM | BODY MASS INDEX: 14.04 KG/M2

## 2023-01-07 LAB
ALBUMIN SERPL-MCNC: 2.4 G/DL (ref 3.4–5)
ANION GAP SERPL CALCULATED.3IONS-SCNC: 11 MMOL/L (ref 3–16)
BUN BLDV-MCNC: 19 MG/DL (ref 7–20)
CALCIUM SERPL-MCNC: 8.6 MG/DL (ref 8.3–10.6)
CHLORIDE BLD-SCNC: 105 MMOL/L (ref 99–110)
CO2: 20 MMOL/L (ref 21–32)
CREAT SERPL-MCNC: 1.4 MG/DL (ref 0.9–1.3)
GFR SERPL CREATININE-BSD FRML MDRD: >60 ML/MIN/{1.73_M2}
GLUCOSE BLD-MCNC: 125 MG/DL (ref 70–99)
HCT VFR BLD CALC: 32.1 % (ref 40.5–52.5)
HEMOGLOBIN: 9.6 G/DL (ref 13.5–17.5)
MAGNESIUM: 2.2 MG/DL (ref 1.8–2.4)
MCH RBC QN AUTO: 21 PG (ref 26–34)
MCHC RBC AUTO-ENTMCNC: 29.9 G/DL (ref 31–36)
MCV RBC AUTO: 70 FL (ref 80–100)
PDW BLD-RTO: 29.4 % (ref 12.4–15.4)
PHOSPHORUS: 2.7 MG/DL (ref 2.5–4.9)
PLATELET # BLD: 378 K/UL (ref 135–450)
PMV BLD AUTO: 8.5 FL (ref 5–10.5)
POTASSIUM SERPL-SCNC: 4.3 MMOL/L (ref 3.5–5.1)
RBC # BLD: 4.59 M/UL (ref 4.2–5.9)
SODIUM BLD-SCNC: 136 MMOL/L (ref 136–145)
WBC # BLD: 6.3 K/UL (ref 4–11)

## 2023-01-07 PROCEDURE — 87506 IADNA-DNA/RNA PROBE TQ 6-11: CPT

## 2023-01-07 PROCEDURE — 83993 ASSAY FOR CALPROTECTIN FECAL: CPT

## 2023-01-07 PROCEDURE — 85027 COMPLETE CBC AUTOMATED: CPT

## 2023-01-07 PROCEDURE — 6370000000 HC RX 637 (ALT 250 FOR IP): Performed by: NURSE PRACTITIONER

## 2023-01-07 PROCEDURE — 83735 ASSAY OF MAGNESIUM: CPT

## 2023-01-07 PROCEDURE — 36415 COLL VENOUS BLD VENIPUNCTURE: CPT

## 2023-01-07 PROCEDURE — 80069 RENAL FUNCTION PANEL: CPT

## 2023-01-07 PROCEDURE — 6370000000 HC RX 637 (ALT 250 FOR IP): Performed by: INTERNAL MEDICINE

## 2023-01-07 RX ORDER — PANTOPRAZOLE SODIUM 40 MG/1
40 TABLET, DELAYED RELEASE ORAL
Qty: 30 TABLET | Refills: 3 | Status: SHIPPED | OUTPATIENT
Start: 2023-01-07

## 2023-01-07 RX ORDER — MESALAMINE 400 MG/1
1600 CAPSULE, DELAYED RELEASE ORAL 3 TIMES DAILY
Qty: 180 CAPSULE | Refills: 1 | Status: SHIPPED | OUTPATIENT
Start: 2023-01-07 | End: 2023-02-06

## 2023-01-07 RX ORDER — PREDNISONE 20 MG/1
40 TABLET ORAL DAILY
Qty: 20 TABLET | Refills: 0 | Status: SHIPPED | OUTPATIENT
Start: 2023-01-08 | End: 2023-01-18

## 2023-01-07 RX ADMIN — FERROUS SULFATE TAB 325 MG (65 MG ELEMENTAL FE) 325 MG: 325 (65 FE) TAB at 09:20

## 2023-01-07 RX ADMIN — PREDNISONE 40 MG: 20 TABLET ORAL at 09:20

## 2023-01-07 RX ADMIN — MESALAMINE 1600 MG: 400 CAPSULE, DELAYED RELEASE ORAL at 09:20

## 2023-01-07 RX ADMIN — PANTOPRAZOLE SODIUM 40 MG: 40 TABLET, DELAYED RELEASE ORAL at 05:47

## 2023-01-07 NOTE — PLAN OF CARE
Problem: Discharge Planning  Goal: Discharge to home or other facility with appropriate resources  Outcome: Progressing     Problem: Safety - Adult  Goal: Free from fall injury  1/6/2023 2205 by Kat Day RN  Outcome: Lisa Fus (Taken 1/6/2023 1930)  Free From Fall Injury: Instruct family/caregiver on patient safety  1/6/2023 1749 by Delonte Meadows RN  Outcome: Progressing  Flowsheets (Taken 1/6/2023 1749)  Free From Fall Injury: Instruct family/caregiver on patient safety     Problem: Nutrition Deficit:  Goal: Optimize nutritional status  1/6/2023 2205 by Kat Day RN  Outcome: Progressing  1/6/2023 1749 by Delonte Meadows RN  Outcome: Progressing  Flowsheets  Taken 1/6/2023 1749 by Delonte Meadows RN  Nutrient intake appropriate for improving, restoring, or maintaining nutritional needs:   Assess nutritional status and recommend course of action   Recommend appropriate diets, oral nutritional supplements, and vitamin/mineral supplements   Monitor oral intake, labs, and treatment plans   Order, calculate, and assess calorie counts as needed  Taken 1/6/2023 1148 by Luis Alberto Valverde, MS, RD, LD  Nutrient intake appropriate for improving, restoring, or maintaining nutritional needs:   Assess nutritional status and recommend course of action   Monitor oral intake, labs, and treatment plans   Recommend appropriate diets, oral nutritional supplements, and vitamin/mineral supplements   Provide specific nutrition education to patient or family as appropriate

## 2023-01-07 NOTE — PROGRESS NOTES
Planning discharge. Clarified with nursing mesalamine at 1600 mg tid and continue iron supplementation as outpatient.   Call if further questions or concerns

## 2023-01-07 NOTE — PROGRESS NOTES
Pt evening shift assessment complete. VSS and medication given as ordered. Pt assessed for comfort needs, assisted to the bathroom and back to bed. Pt does not express any additional needs at this time, resting comfortably in bed.

## 2023-01-07 NOTE — PLAN OF CARE
Problem: Discharge Planning  Goal: Discharge to home or other facility with appropriate resources  Outcome: Completed     Problem: Safety - Adult  Goal: Free from fall injury  Outcome: Completed     Problem: Nutrition Deficit:  Goal: Optimize nutritional status  Outcome: Completed     Problem: Pain  Goal: Verbalizes/displays adequate comfort level or baseline comfort level  Outcome: Completed

## 2023-01-08 LAB — GI BACTERIAL PATHOGENS BY PCR: NORMAL

## 2023-01-08 NOTE — DISCHARGE SUMMARY
Hospital Medicine Discharge Summary    Patient ID: Clarita Beauchamp      Patient's PCP: Bruna Guevara MD    Admit Date: 1/2/2023     Discharge Date: 1/7/2023  ***    Admitting Provider: Joaquin Bowling MD     Discharge Provider: Krupa Kearney MD     Discharge Diagnoses: Active Hospital Problems    Diagnosis     Severe malnutrition ([facility] [address] [E43]      Priority: Medium    Colitis [K52.9]      Priority: Medium       The patient was seen and examined on day of discharge and this discharge summary is in conjunction with any daily progress note from day of discharge. Hospital Course: ***          Physical Exam Performed:     /64   Pulse 60   Temp 97.4 °F (36.3 °C) (Oral)   Resp 16   Ht 5' 2\" (1.575 m)   Wt 76 lb 4.5 oz (34.6 kg)   SpO2 100%   BMI 13.95 kg/m²       General appearance:  No apparent distress, appears stated age and cooperative. HEENT:  Normal cephalic, atraumatic without obvious deformity. Pupils equal, round, and reactive to light. Extra ocular muscles intact. Conjunctivae/corneas clear. Neck: Supple, with full range of motion. No jugular venous distention. Trachea midline. Respiratory:  Normal respiratory effort. Clear to auscultation, bilaterally without Rales/Wheezes/Rhonchi. Cardiovascular:  Regular rate and rhythm with normal S1/S2 without murmurs, rubs or gallops. Abdomen: Soft, non-tender, non-distended with normal bowel sounds. Musculoskeletal:  No clubbing, cyanosis or edema bilaterally. Full range of motion without deformity. Skin: Skin color, texture, turgor normal.  No rashes or lesions. Neurologic:  Neurovascularly intact without any focal sensory/motor deficits. Cranial nerves: II-XII intact, grossly non-focal.  Psychiatric:  Alert and oriented, thought content appropriate, normal insight  Capillary Refill: Brisk,< 3 seconds   Peripheral Pulses: +2 palpable, equal bilaterally       Labs:  For convenience and continuity at follow-up the following most recent labs are provided:      CBC:    Lab Results   Component Value Date/Time    WBC 6.3 01/07/2023 06:59 AM    HGB 9.6 01/07/2023 06:59 AM    HCT 32.1 01/07/2023 06:59 AM     01/07/2023 06:59 AM       Renal:    Lab Results   Component Value Date/Time     01/07/2023 06:59 AM    K 4.3 01/07/2023 06:59 AM    K 5.1 01/02/2023 11:31 AM     01/07/2023 06:59 AM    CO2 20 01/07/2023 06:59 AM    BUN 19 01/07/2023 06:59 AM    CREATININE 1.4 01/07/2023 06:59 AM    CALCIUM 8.6 01/07/2023 06:59 AM    PHOS 2.7 01/07/2023 06:59 AM         Significant Diagnostic Studies    Radiology:   CT ABDOMEN PELVIS W IV CONTRAST Additional Contrast? None   Final Result   Diffuse abnormal colonic wall thickening seen with surrounding injection of   the pericolonic fat, likely secondary to the reported history of ulcerative   colitis. No pericolonic abscess. Fatty liver and cholelithiasis      Mild left inguinal adenopathy, likely reactive.                 Consults:     IP CONSULT TO GI    Disposition:  ***     Condition at Discharge: 8 Susan Elpidio Patient Condition:[id number]}    Discharge Instructions/Follow-up:  ***    Code Status:  Prior ***    Activity: activity as tolerated    Diet: {diet:17026}      Discharge Medications:     Discharge Medication List as of 1/7/2023  1:55 PM             Details   pantoprazole (PROTONIX) 40 MG tablet Take 1 tablet by mouth 2 times daily (before meals), Disp-30 tablet, R-3Normal      predniSONE (DELTASONE) 20 MG tablet Take 2 tablets by mouth daily for 10 days, Disp-20 tablet, R-0Normal      mesalamine (DELZICOL) 400 MG CPDR delayed release capsule Take 4 capsules by mouth 3 times daily, Disp-180 capsule, R-1Normal                Details   ferrous sulfate (IRON 325) 325 (65 Fe) MG tablet Take 1 tablet by mouth 2 times daily, Disp-60 tablet,R-5Normal             Time Spent on discharge: *** in the examination, evaluation, counseling and review of medications and discharge plan.      Signed:    Silverio Guillermo MD   1/7/2023      Thank you Yana Castillo MD for the opportunity to be involved in this patient's care. If you have any questions or concerns, please feel free to contact me at [phone].

## 2023-01-09 LAB — CALPROTECTIN, FECAL: >3000 UG/G

## 2023-01-11 ENCOUNTER — OFFICE VISIT (OUTPATIENT)
Dept: INTERNAL MEDICINE CLINIC | Age: 33
End: 2023-01-11

## 2023-01-11 VITALS
HEIGHT: 62 IN | DIASTOLIC BLOOD PRESSURE: 70 MMHG | OXYGEN SATURATION: 99 % | BODY MASS INDEX: 18.4 KG/M2 | WEIGHT: 100 LBS | HEART RATE: 97 BPM | SYSTOLIC BLOOD PRESSURE: 110 MMHG

## 2023-01-11 DIAGNOSIS — R63.4 WEIGHT LOSS: ICD-10-CM

## 2023-01-11 DIAGNOSIS — D50.0 IRON DEFICIENCY ANEMIA DUE TO CHRONIC BLOOD LOSS: ICD-10-CM

## 2023-01-11 DIAGNOSIS — E43 EDEMA DUE TO MALNUTRITION, DUE TO UNSPECIFIED MALNUTRITION TYPE (HCC): ICD-10-CM

## 2023-01-11 DIAGNOSIS — K51.011 ULCERATIVE PANCOLITIS WITH RECTAL BLEEDING (HCC): Primary | ICD-10-CM

## 2023-01-11 PROCEDURE — 99204 OFFICE O/P NEW MOD 45 MIN: CPT | Performed by: INTERNAL MEDICINE

## 2023-01-11 ASSESSMENT — PATIENT HEALTH QUESTIONNAIRE - PHQ9
SUM OF ALL RESPONSES TO PHQ QUESTIONS 1-9: 0
2. FEELING DOWN, DEPRESSED OR HOPELESS: 0
1. LITTLE INTEREST OR PLEASURE IN DOING THINGS: 0
SUM OF ALL RESPONSES TO PHQ9 QUESTIONS 1 & 2: 0

## 2023-01-11 ASSESSMENT — ENCOUNTER SYMPTOMS
SHORTNESS OF BREATH: 0
COUGH: 0

## 2023-01-11 NOTE — PROGRESS NOTES
Refugio Kerns (:  1990) is a 27 y.o. male, here for evaluation of the following chief complaint(s):  New Patient (No insurance or PCP. ), Foot Swelling (Bilateral foot edema ), and Other (Questions about his iron levels from the Hospital )         ASSESSMENT/PLAN:  1. Ulcerative pancolitis with rectal bleeding ([facility] [address]  2. Iron deficiency anemia due to chronic blood loss  -     CBC with Auto Differential; Future  -     Comprehensive Metabolic Panel; Future  3. Weight loss  4. Edema due to malnutrition, due to unspecified malnutrition type ([address]  #1. History of ulcerative colitis with resulting iron deficiency anemia and malnutrition. Currently is on prednisone mesalamine and Protonix. He will be started on iron today. He was scheduled for follow-up with gastroenterologist in approximately 1 week. 2.  Iron deficiency anemia secondary to #1. He should respond well to iron and will have follow-up blood studies in approximately 6 weeks  3. Edema secondary to low albumin and malnutrition. This should respond to treatment of the ulcerative colitis I did discuss dietary aspects with him. I also discussed the use of leg elevation and if necessary support hose. He should do blood test in approximately 6 weeks order was given for CMP and CBC. He should follow-up with myself in approximately 3 months. He is in the process of applying for Medicaid  No follow-ups on file. Subjective   SUBJECTIVE/OBJECTIVE:  HPI patient in for follow-up of recent hospital stay  through 2023 due to problems of ulcerative colitis that resulted in anemia and malnutrition. Patient has a history of ulcerative colitis dating back to at least 2018. He has been prescribed medicines in the past but usually does not follow through with taking them. Recently became progressively weaker and in the ER he was found to be very anemic and also malnourished.   He did undergo EGD and colonoscopy which documented evidence of inflammatory bowel disease.  He was started on prednisone iron Protonix and mesalamine.  Since being out of the hospital he has felt slightly better but has not started the iron.  He and his mother also state that the mesalamine is overly expensive and they will not be able to afford to stay on that.    Review of Systems   Respiratory:  Negative for cough and shortness of breath.    Cardiovascular:  Positive for leg swelling. Negative for chest pain and palpitations.   All other systems reviewed and are negative.       Objective   Physical Exam  HENT:      Head: Normocephalic.   Cardiovascular:      Rate and Rhythm: Normal rate.      Heart sounds: Normal heart sounds. No murmur heard.  Pulmonary:      Effort: Pulmonary effort is normal.      Breath sounds: Normal breath sounds.   Abdominal:      General: Abdomen is flat. Bowel sounds are normal.      Palpations: Abdomen is soft.   Musculoskeletal:         General: Swelling present.      Cervical back: Normal range of motion.      Comments: He had 2-3+ edema extending up to upper calf and his left leg and lower calf and his right leg.  There is no evidence of skin breakdown   Neurological:      Mental Status: He is alert.                An electronic signature was used to authenticate this note.    --NATE MARTÍNEZ MD

## 2023-01-11 NOTE — PROGRESS NOTES
Gastroenterology Consult Note    Patient:   Karen Garibay   YOB: 1990   Facility:   [facility] [address]   Referring/PCP: Melida Meadows MD  Date:     1/18/2023  Consultant:   Yobany Tristan MD, MD    Subjective: This 27 y.o. male  is seen in consultation regarding \"ulcerative colitis\". Information was obtained from interview of  the patient and the patient's mother and father, examination of the patient, and review of records. I did  update the past medical, surgical, social and / or family history. Patient has a 4+ year history of Ulcerative Colitis, Diagnosed 01/2019, originally treated with prednisone and treated with ASA for a short period, stopping it on his own. He denies rectal bleeding but had diarrhea continued. Recent hospitalization confirmed diagnosis of Ulcerative Colitis as well as a new diagnosis of ulcerative esophagitis. He developed bilateral pedal edema and unilateral left calf edema ~01/10/23. Diarrhea has decreased 10-15 times per day, half of what was happening; no significant bleeding; significant urgency. He denies pyrosis and dysphagia. Prior to Admission medications    Medication Sig Start Date End Date Taking?  Authorizing Provider   pantoprazole (PROTONIX) 40 MG tablet Take 1 tablet by mouth 2 times daily (before meals) 1/7/23  Yes Casey Toney MD   mesalamine (DELZICOL) 400 MG CPDR delayed release capsule Take 4 capsules by mouth 3 times daily 1/7/23 2/6/23 Yes Casey Toney MD   ferrous sulfate (IRON 325) 325 (65 Fe) MG tablet    10/6/20  Yes KATTY Carter - CNP   Prednisone 20mg 40 mg once daily 01/07/23 01/17/23 NO         Allergies: No Known Allergies    Past Medical History:   Diagnosis Date    Cholelithiasis 01/02/2023    See CT    Fatty liver 01/02/2023    See CT    JACQUI (iron deficiency anemia) 10/16/2018    Inguinal lymphadenopathy 01/02/2023    Left, see CT    Malnutrition ([address] 01/05/2023 Ulcerative colitis ([facility] [address] 01/25/2019    Ulcerative esophagitis 01/05/2023     Past Surgical History:   Procedure Laterality Date    COLONOSCOPY N/A 01/25/2019    Pan ulcerative colitis    COLONOSCOPY N/A 01/05/2023    Pan ulcerative colitis    EXTERNAL EAR SURGERY      TYMPANOSTOMY TUBE PLACEMENT      X 2    UPPER GASTROINTESTINAL ENDOSCOPY N/A 01/25/2019    Normal    UPPER GASTROINTESTINAL ENDOSCOPY N/A 01/05/2023    Ulcerative Esophagitis       Social:   Social History     Tobacco Use    Smoking status: Never    Smokeless tobacco: Never   Substance Use Topics    Alcohol use: No     Family:   Family History   Problem Relation Age of Onset    Diabetes Mother     Other Father         GERD    Ulcerative Colitis Father         Required colectomy after failing Remicade       ROS: Pertinent items are noted in HPI.     Objective:   Vital Signs:  /64   Ht 5' 2\" (1.575 m)   Wt 104 lb (47.2 kg)   BMI 19.02 kg/m²      Physical Exam:   General appearance: alert, appears older than stated age, cachectic, cooperative, fatigued, and no distress  Eyes: negative findings: conjunctivae and sclerae normal  Neck: no adenopathy, no carotid bruit, and no JVD  Back:  mild kyphoscolisosis  Lungs: clear to auscultation bilaterally  Heart: regular rate and rhythm, S1, S2 normal, no murmur, click, rub or gallop  Abdomen: soft, non-tender; bowel sounds normal; no masses,  no organomegaly  Extremities: edema 1+ right, 3+ left almost to knee; negative Rebolledo's sign  Pulses: 2+ and symmetric  Skin:  excoriations, lower legs  Lymph nodes: Cervical, supraclavicular, and axillary nodes normal.  Neurologic: Mental status: Alert, oriented, thought content appropriate    Lab and Imaging Review   Lab Results   Component Value Date    WBC 6.3 01/07/2023    HGB 9.6 (L) 01/07/2023    MCV 70.0 (L) 01/07/2023     01/07/2023     01/07/2023    K 4.3 01/07/2023     01/07/2023    CO2 20 (L) 01/07/2023    BUN 19 01/07/2023    CREATININE 1.4 (H) 01/07/2023    GLUCOSE 125 (H) 01/07/2023    PROT 7.9 01/02/2023    LABALBU 2.4 (L) 01/07/2023    CALCIUM 8.6 01/07/2023    AST 26 01/02/2023    ALT 15 01/02/2023    ALKPHOS 114 01/02/2023    BILITOT <0.2 01/02/2023    CHOL 152 01/18/2018    HDL 59 01/18/2018    LDLCALC 82 01/18/2018    TRIG 54 01/18/2018    LABA1C 5.6 10/05/2020    TSH 2.17 10/05/2020       Assessment:     Ulcerative Colitis: pan ulcerative colitis, suspect \"backwash ileitis\" not Crohn's Disease;  Active but improved   GERD / Ulcerative Esophagitis without alarm symptoms  Protein Calorie Malnutrition  Pedal Edema due to #3  Iron Deficiency Anemia due to #1 & #2    Plan:   Resume Prednisone @ 20 mg / D  Stop Mesalamine, substitute Sulfasalazine (cost consideration)  Multivitamin daily; goal 100 mg protein per day   Decrease Pantoprazole to once daily  Check CBC, Albumin before return Office visit in 4 weeks     [address]  [phone]

## 2023-01-11 NOTE — LETTER
[facility]  [phone] [facility]  Phone: [phone]  Fax: [phone]    Kiki Bella MD         January 11, 2023     Patient: Mindy Griffiths   YOB: 1990   Date of Visit: 1/11/2023       To Whom It May Concern: It is my medical opinion that Ambreen Prescott requires a disability parking placard for the following reasons: severe malnutrition     Duration of need: life time     If you have any questions or concerns, please don't hesitate to call.     Sincerely,        Kiki Bella MD

## 2023-01-18 ENCOUNTER — OFFICE VISIT (OUTPATIENT)
Dept: INTERNAL MEDICINE CLINIC | Age: 33
End: 2023-01-18

## 2023-01-18 VITALS
WEIGHT: 104 LBS | HEIGHT: 62 IN | BODY MASS INDEX: 19.14 KG/M2 | DIASTOLIC BLOOD PRESSURE: 64 MMHG | SYSTOLIC BLOOD PRESSURE: 100 MMHG

## 2023-01-18 DIAGNOSIS — E44.0 MODERATE PROTEIN-CALORIE MALNUTRITION (HCC): ICD-10-CM

## 2023-01-18 DIAGNOSIS — D50.0 IRON DEFICIENCY ANEMIA DUE TO CHRONIC BLOOD LOSS: Primary | ICD-10-CM

## 2023-01-18 PROCEDURE — 99215 OFFICE O/P EST HI 40 MIN: CPT | Performed by: INTERNAL MEDICINE

## 2023-01-18 RX ORDER — PANTOPRAZOLE SODIUM 40 MG/1
40 TABLET, DELAYED RELEASE ORAL DAILY
Qty: 30 TABLET | Refills: 3 | Status: SHIPPED | OUTPATIENT
Start: 2023-01-18

## 2023-01-18 RX ORDER — PREDNISONE 20 MG/1
20 TABLET ORAL DAILY
Qty: 30 TABLET | Refills: 0 | Status: SHIPPED | OUTPATIENT
Start: 2023-01-18 | End: 2023-02-17

## 2023-01-18 RX ORDER — SULFASALAZINE 500 MG/1
1000 TABLET ORAL 4 TIMES DAILY
Qty: 240 TABLET | Refills: 0 | Status: SHIPPED | OUTPATIENT
Start: 2023-01-18 | End: 2023-02-17

## 2023-02-09 NOTE — PATIENT INSTRUCTIONS
· Increase dietary caloric intake  · Shoot for 1800 calories/day of healthy foods. Avoid foods with \"empty calories\" (sweets/sodas, etc)  · You should eat 3 meals and two healthy snacks/day  · Boosts nutritional supplements are good on the go snack. Can try those as meal replacement or in addition to a meal if eating less than 50%  · Follow up in 2 months for weight check  · You should follow up with GI for weight loss as well  Patient Education     Body Mass Index: Care Instructions  Your Care Instructions     Body mass index (BMI) can help you see if your weight is raising your risk for health problems. It uses a formula to compare how much you weigh with how tall you are. · A BMI lower than 18.5 is considered underweight. · A BMI between 18.5 and 24.9 is considered healthy. · A BMI between 25 and 29.9 is considered overweight. A BMI of 30 or higher is considered obese. If your BMI is in the normal range, it means that you have a lower risk for weight-related health problems. If your BMI is in the overweight or obese range, you may be at increased risk for weight-related health problems, such as high blood pressure, heart disease, stroke, arthritis or joint pain, and diabetes. If your BMI is in the underweight range, you may be at increased risk for health problems such as fatigue, lower protection (immunity) against illness, muscle loss, bone loss, hair loss, and hormone problems. BMI is just one measure of your risk for weight-related health problems. You may be at higher risk for health problems if you are not active, you eat an unhealthy diet, or you drink too much alcohol or use tobacco products. Follow-up care is a key part of your treatment and safety. Be sure to make and go to all appointments, and call your doctor if you are having problems. It's also a good idea to know your test results and keep a list of the medicines you take. How can you care for yourself at home?   · Practice healthy eating habits. This includes eating plenty of fruits, vegetables, whole grains, lean protein, and low-fat dairy. · If your doctor recommends it, get more exercise. Walking is a good choice. Bit by bit, increase the amount you walk every day. Try for at least 30 minutes on most days of the week. · Do not smoke. Smoking can increase your risk for health problems. If you need help quitting, talk to your doctor about stop-smoking programs and medicines. These can increase your chances of quitting for good. · Limit alcohol to 2 drinks a day for men and 1 drink a day for women. Too much alcohol can cause health problems. If you have a BMI higher than 25  · Your doctor may do other tests to check your risk for weight-related health problems. This may include measuring the distance around your waist. A waist measurement of more than 40 inches in men or 35 inches in women can increase the risk of weight-related health problems. · Talk with your doctor about steps you can take to stay healthy or improve your health. You may need to make lifestyle changes to lose weight and stay healthy, such as changing your diet and getting regular exercise. If you have a BMI lower than 18.5  · Your doctor may do other tests to check your risk for health problems. · Talk with your doctor about steps you can take to stay healthy or improve your health. You may need to make lifestyle changes to gain or maintain weight and stay healthy, such as getting more healthy foods in your diet and doing exercises to build muscle. Where can you learn more? Go to https://candice.[employer]ube [employer]. org and sign in to your [employer] account. Enter S176 in the [facility] box to learn more about \"Body Mass Index: Care Instructions. \"     If you do not have an account, please click on the \"Sign Up Now\" link. Current as of: December 11, 2019               Content Version: 12.5  © 8765-9930 Healthwise, Incorporated.    Care instructions adapted under license by [facility] ([facility]). If you have questions about a medical condition or this instruction, always ask your healthcare professional. [address] any warranty or liability for your use of this information. ·       Patient Education        Eating Healthy Foods: Care Instructions  Your Care Instructions     Eating healthy foods can help lower your risk for disease. Healthy food gives you energy and keeps your heart strong, your brain active, your muscles working, and your bones strong. A healthy diet includes a variety of foods from the basic food groups: grains, vegetables, fruits, milk and milk products, and meat and beans. Some people may eat more of their favorite foods from only one food group and, as a result, miss getting the nutrients they need. So, it is important to pay attention not only to what you eat but also to what you are missing from your diet. You can eat a healthy, balanced diet by making a few small changes. Follow-up care is a key part of your treatment and safety. Be sure to make and go to all appointments, and call your doctor if you are having problems. It's also a good idea to know your test results and keep a list of the medicines you take. How can you care for yourself at home? Look at what you eat  · Keep a food diary for a week or two and record everything you eat or drink. Track the number of servings you eat from each food group. · For a balanced diet every day, eat a variety of:  ? 6 or more ounce-equivalents of grains, such as cereals, breads, crackers, rice, or pasta, every day. An ounce-equivalent is 1 slice of bread, 1 cup of ready-to-eat cereal, or ½ cup of cooked rice, cooked pasta, or cooked cereal.  ? 2½ cups of vegetables, especially:  § Dark-green vegetables such as broccoli and spinach. § Orange vegetables such as carrots and sweet potatoes. § Dry beans (such as reyes and kidney beans) and peas (such as lentils).   ? 2 cups of fresh, frozen, or canned fruit. A small apple or 1 banana or orange equals 1 cup. ? 3 cups of nonfat or low-fat milk, yogurt, or other milk products. ? 5½ ounces of meat and beans, such as chicken, fish, lean meat, beans, nuts, and seeds. One egg, 1 tablespoon of peanut butter, ½ ounce nuts or seeds, or ¼ cup of cooked beans equals 1 ounce of meat. · Learn how to read food labels for serving sizes and ingredients. Fast-food and convenience-food meals often contain few or no fruits or vegetables. Make sure you eat some fruits and vegetables to make the meal more nutritious. · Look at your food diary. For each food group, add up what you have eaten and then divide the total by the number of days. This will give you an idea of how much you are eating from each food group. See if you can find some ways to change your diet to make it more healthy. Start small  · Do not try to make dramatic changes to your diet all at once. You might feel that you are missing out on your favorite foods and then be more likely to fail. · Start slowly, and gradually change your habits. Try some of the following:  ? Use whole wheat bread instead of white bread. ? Use nonfat or low-fat milk instead of whole milk. ? Eat brown rice instead of white rice, and eat whole wheat pasta instead of white-flour pasta. ? Try low-fat cheeses and low-fat yogurt. ? Add more fruits and vegetables to meals and have them for snacks. ? Add lettuce, tomato, cucumber, and onion to sandwiches. ? Add fruit to yogurt and cereal.  Enjoy food  · You can still eat your favorite foods. You just may need to eat less of them. If your favorite foods are high in fat, salt, and sugar, limit how often you eat them, but do not cut them out entirely. · Eat a wide variety of foods. Make healthy choices when eating out  · The type of restaurant you choose can help you make healthy choices.  Even fast-food chains are now offering more low-fat or healthier choices on the menu.  · Choose smaller portions, or take half of your meal home. · When eating out, try:  ? A veggie pizza with a whole wheat crust or grilled chicken (instead of sausage or pepperoni). ? Pasta with roasted vegetables, grilled chicken, or marinara sauce instead of cream sauce. ? A vegetable wrap or grilled chicken wrap. ? Broiled or poached food instead of fried or breaded items. Make healthy choices easy  · Buy packaged, prewashed, ready-to-eat fresh vegetables and fruits, such as baby carrots, salad mixes, and chopped or shredded broccoli and cauliflower. · Buy packaged, presliced fruits, such as melon or pineapple. · Choose 100% fruit or vegetable juice instead of soda. Limit juice intake to 4 to 6 oz (½ to ¾ cup) a day. · Blend low-fat yogurt, fruit juice, and canned or frozen fruit to make a smoothie for breakfast or a snack. Where can you learn more? Go to https://TeamBuy.[employer]. org and sign in to your [employer] account. Enter G568 in the [employer] box to learn more about \"Eating Healthy Foods: Care Instructions. \"     If you do not have an account, please click on the \"Sign Up Now\" link. Current as of: August 22, 2019               Content Version: 12.5  © 6579-8035 Healthwise, Incorporated. Care instructions adapted under license by [facility] ([facility]). If you have questions about a medical condition or this instruction, always ask your healthcare professional. [place] [address] any warranty or liability for your use of this information. GOAL: Patient will ambulate 150 feet with RW and CGAx1, in 4 weeks.

## 2023-02-15 ENCOUNTER — OFFICE VISIT (OUTPATIENT)
Dept: INTERNAL MEDICINE CLINIC | Age: 33
End: 2023-02-15

## 2023-02-15 VITALS
WEIGHT: 121 LBS | HEIGHT: 62 IN | OXYGEN SATURATION: 96 % | HEART RATE: 120 BPM | DIASTOLIC BLOOD PRESSURE: 70 MMHG | BODY MASS INDEX: 22.26 KG/M2 | SYSTOLIC BLOOD PRESSURE: 120 MMHG

## 2023-02-15 DIAGNOSIS — N18.2 STAGE 2 CHRONIC KIDNEY DISEASE: ICD-10-CM

## 2023-02-15 DIAGNOSIS — Z13.1 SCREENING FOR DIABETES MELLITUS (DM): Primary | ICD-10-CM

## 2023-02-15 DIAGNOSIS — R60.0 PEDAL EDEMA: ICD-10-CM

## 2023-02-15 LAB
BILIRUBIN, POC: ABNORMAL
BLOOD URINE, POC: ABNORMAL
CLARITY, POC: CLEAR
COLOR, POC: ABNORMAL
GLUCOSE URINE, POC: ABNORMAL
HBA1C MFR BLD: 5.2 %
KETONES, POC: ABNORMAL
LEUKOCYTE EST, POC: ABNORMAL
NITRITE, POC: ABNORMAL
PH, POC: 5.5
PROTEIN, POC: ABNORMAL
SPECIFIC GRAVITY, POC: 1.02
UROBILINOGEN, POC: 0.2

## 2023-02-15 PROCEDURE — 99214 OFFICE O/P EST MOD 30 MIN: CPT | Performed by: INTERNAL MEDICINE

## 2023-02-15 PROCEDURE — 83036 HEMOGLOBIN GLYCOSYLATED A1C: CPT | Performed by: INTERNAL MEDICINE

## 2023-02-15 PROCEDURE — 93975 VASCULAR STUDY: CPT | Performed by: INTERNAL MEDICINE

## 2023-02-15 PROCEDURE — 81002 URINALYSIS NONAUTO W/O SCOPE: CPT | Performed by: INTERNAL MEDICINE

## 2023-02-15 RX ORDER — SULFASALAZINE 500 MG/1
1000 TABLET ORAL 4 TIMES DAILY
Qty: 240 TABLET | Refills: 5 | Status: SHIPPED | OUTPATIENT
Start: 2023-02-15 | End: 2023-03-17

## 2023-02-15 RX ORDER — PREDNISONE 1 MG/1
5 TABLET ORAL DAILY
Qty: 30 TABLET | Refills: 0 | Status: SHIPPED | OUTPATIENT
Start: 2023-02-15 | End: 2023-03-17

## 2023-02-15 NOTE — PROGRESS NOTES
Gastroenterology Note  Patient:   Davi Billing   YOB: 1990   Facility:   [facility] clinic   Date:     2/15/2023  Consultant:   Chandler Mcdowell MD, MD      Subjective:     27 y.o. male seen for follow up Ulcerative Colitis. Since the change from mesalamine to sulfasalazine and decrease in prednisone to 20 mg / D, he has more formed stools, although some liquid, 10 stools / day, no gross blood, persistent urgency. With  pantoprazole, he has had minimal pyrosis, no dysphagia. He did not decrease dose. He has improved protein intake but is consuming 4-6 soda / day. With regards to weight, he reports gained 17 lbs over 28 days. Review of available records reveals:  02/15/23 121 lb (54.9 kg)   01/18/23 104 lb (47.2 kg)   01/11/23 100 lb (45.4 kg)   01/02/23 76 lb 4.5 oz (34.6 kg)   10/05/20 105 lb (47.6 kg)   12/04/19 113 lb (51.3 kg)   04/15/19 105 lb 6.4 oz (47.8 kg)   03/04/19 107 lb (48.5 kg)   01/25/19 112 lb (50.8 kg)   01/22/19 117 lb 3.2 oz (53.2 kg)   01/16/19 119 lb (54 kg)       Prior to Admission medications    Medication Sig Start Date End Date Taking?  Authorizing Provider   pantoprazole (PROTONIX) 40 MG tablet Take 1 tablet by mouth daily 1/18/23  Yes Chandler Mcdowell MD   predniSONE (DELTASONE) 20 MG tablet Take 1 tablet by mouth daily 1/18/23 2/17/23 Yes Chandler Mcdowell MD   sulfaSALAzine (AZULFIDINE) 500 MG tablet Take 2 tablets by mouth 4 times daily 1/18/23 2/17/23 Yes Chandler Mcdowell MD   ferrous sulfate (IRON 325) 325 (65 Fe) MG tablet Take 1 tablet by mouth 2 times daily 10/6/20  Yes KATTY Putnam - CNP      Allergies: No Known Allergies  Past Medical History:   Diagnosis Date    Cholelithiasis 01/02/2023    See CT    Fatty liver 01/02/2023    See CT    JACQUI (iron deficiency anemia) 10/16/2018    Inguinal lymphadenopathy 01/02/2023    Left, see CT    Malnutrition ([facility] [address] 01/05/2023    Non-functioning kidney 01/02/2023 See CT    Ulcerative colitis ([facility] [address] 01/25/2019    Ulcerative esophagitis 01/05/2023     Past Surgical History:   Procedure Laterality Date    COLONOSCOPY N/A 01/25/2019    Pan ulcerative colitis    COLONOSCOPY N/A 01/05/2023    Pan ulcerative colitis    EXTERNAL EAR SURGERY      TYMPANOSTOMY TUBE PLACEMENT      X 2    UPPER GASTROINTESTINAL ENDOSCOPY N/A 01/25/2019    Normal    UPPER GASTROINTESTINAL ENDOSCOPY N/A 01/05/2023    Ulcerative Esophagitis     Social:   Social History     Tobacco Use    Smoking status: Never    Smokeless tobacco: Never   Substance Use Topics    Alcohol use: No     Family:   Family History   Problem Relation Age of Onset    Diabetes Mother     Other Father         GERD    Ulcerative Colitis Father         Required colectomy after failing Remicade       ROS: Pertinent items are noted in HPI, Past Medical/Surgical History    Objective:   Vital Signs:  /70   Pulse (!) 120   Ht 5' 1.5\" (1.562 m)   Wt 121 lb (54.9 kg)   SpO2 96%   BMI 22.49 kg/m²      Physical Exam:   General appearance: alert, appears stated age, cooperative, and no distress  Lungs: clear to auscultation bilaterally  Heart: regular rate and rhythm, S1, S2 normal, no murmur, click, rub or gallop  Abdomen: soft, non-tender; bowel sounds normal; no masses,  no organomegaly  Extremities 3+ pitting edema, L > R    Lab and Imaging Review   Lab Results   Component Value Date    WBC 6.3 01/07/2023    HGB 9.6 (L) 01/07/2023    MCV 70.0 (L) 01/07/2023     01/07/2023     01/07/2023    K 4.3 01/07/2023     01/07/2023    CO2 20 (L) 01/07/2023    BUN 19 01/07/2023    CREATININE 1.4 (H) 01/07/2023    GLUCOSE 125 (H) 01/07/2023    PROT 7.9 01/02/2023    LABALBU 2.4 (L) 01/07/2023    CALCIUM 8.6 01/07/2023    AST 26 01/02/2023    ALT 15 01/02/2023    ALKPHOS 114 01/02/2023    BILITOT <0.2 01/02/2023    CHOL 152 01/18/2018    HDL 59 01/18/2018    LDLCALC 82 01/18/2018    TRIG 54 01/18/2018    LABA1C 5.2 02/15/2023 TSH 2.17 10/05/2020     2/10/23 W6.5 H8.6 [id number] Albumin 3.5  2/15/2023 A1c 5.2   2/15/2023 UA Trace Protein  01/02/23 CT A&P No retroperitoneal or pelvic lymphadenopathy    Assessment:     Ulcerative Colitis Gaining Remission  Esophagitis Apparently adequately controlled  Iron Deficiency Anemia Increased MCV is encouraging  Bilateral lower extremities edema, L. R, concerning for lymphedema although CT argues against that. With increased albumin would have anticipated decrease. Left Kidney not visualized on CT and Creat 1.4 but only trace protein    Plan:   Continue Sulfasalazine  2. Decrease Prednisone to 10 mg / D as 5mgx2 /D   3. Continue MVI  4. Decrease Pantoprazole to 40 mg/ D   5. Continue FeSO4  6. Venous US  7. Decrease Na intake  8.  Return Clinic 4 weeks    [address]  [phone]

## 2023-02-21 LAB — ALBUMIN: 3.5

## 2023-02-24 ENCOUNTER — HOSPITAL ENCOUNTER (OUTPATIENT)
Dept: VASCULAR LAB | Age: 33
Discharge: HOME | End: 2023-02-24

## 2023-02-24 DIAGNOSIS — R60.0 PEDAL EDEMA: ICD-10-CM

## 2023-02-24 PROCEDURE — 93970 EXTREMITY STUDY: CPT

## 2023-03-02 NOTE — TELEPHONE ENCOUNTER
Since lowering the prednisone to 10 mg daily he is having more problems with his bowels. Asking if he can increase to 10 mg twice a day?

## 2023-03-03 RX ORDER — PREDNISONE 20 MG/1
20 TABLET ORAL DAILY
Qty: 30 TABLET | Refills: 2 | Status: SHIPPED | OUTPATIENT
Start: 2023-03-03 | End: 2023-04-02

## 2023-03-15 ENCOUNTER — OFFICE VISIT (OUTPATIENT)
Dept: INTERNAL MEDICINE CLINIC | Age: 33
End: 2023-03-15

## 2023-03-15 VITALS
WEIGHT: 114 LBS | HEART RATE: 123 BPM | HEIGHT: 62 IN | SYSTOLIC BLOOD PRESSURE: 100 MMHG | DIASTOLIC BLOOD PRESSURE: 80 MMHG | OXYGEN SATURATION: 97 % | BODY MASS INDEX: 20.98 KG/M2

## 2023-03-15 DIAGNOSIS — K51.011 ULCERATIVE PANCOLITIS WITH RECTAL BLEEDING (HCC): ICD-10-CM

## 2023-03-15 DIAGNOSIS — D50.0 IRON DEFICIENCY ANEMIA DUE TO CHRONIC BLOOD LOSS: ICD-10-CM

## 2023-03-15 DIAGNOSIS — N18.2 STAGE 2 CHRONIC KIDNEY DISEASE: Primary | ICD-10-CM

## 2023-03-15 DIAGNOSIS — E43 EDEMA DUE TO MALNUTRITION, DUE TO UNSPECIFIED MALNUTRITION TYPE (HCC): ICD-10-CM

## 2023-03-15 NOTE — PROGRESS NOTES
Gastroenterology Note  Patient:   Branson Nyhan   YOB: 1990   Facility:   [facility] clinic   Date:     3/15/2023  Consultant:   Enrique Camara MD, MD      Subjective:     27 y.o. male seen for Ulcerative Colitis. Patient has been altering his medications and is apparently taking Prednisone 20 mg twice Daily and FeSO4 once daily, Sulfasalzine 500mg x 2 three, sometimes 4 times per day,And MVI  Diarrheal frequency is presently apparently down to 3 or 4 times / Day, sees no gross blood, and urgency has decreased but is still an issue. With regards to weight,Review of available records reveals:  03/15/23 114 lb (51.7 kg)   02/15/23 121 lb (54.9 kg)   01/18/23 104 lb (47.2 kg)   01/11/23 100 lb (45.4 kg)   01/02/23 76 lb 4.5 oz (34.6 kg)   10/05/20 105 lb (47.6 kg)   12/04/19 113 lb (51.3 kg)   04/15/19 105 lb 6.4 oz (47.8 kg)   03/04/19 107 lb (48.5 kg)   01/25/19 112 lb (50.8 kg)   01/22/19 117 lb 3.2 oz (53.2 kg)   01/16/19 119 lb (54 kg)   10/16/18 124 lb (56.2 kg)   05/24/18 127 lb 12.8 oz (58 kg)   05/17/18 126 lb 12.8 oz (57.5 kg)   04/16/18 132 lb (59.9 kg)   04/09/18 129 lb (58.5 kg)   01/18/18 143 lb (64.9 kg)   06/05/17 174 lb (78.9 kg)   06/09/16 174 lb (78.9 kg)   03/16/16 167 lb (75.8 kg)   01/14/16 160 lb (72.6 kg)   01/14/13 174 lb 12.8 oz (79.3 kg)   06/04/12 159 lb (72.1 kg)   05/21/12 156 lb (70.8 kg)   08/19/10 189 lb (85.7 kg)       Prior to Admission medications    Medication Sig Start Date End Date Taking?  Authorizing Provider   predniSONE (DELTASONE) 20 MG tablet Take 1 tablet by mouth daily  Patient taking differently: Take 20 mg by mouth daily Taking 20 mg Twice a day 3/3/23 4/2/23 Yes Stone Anna MD   sulfaSALAzine (AZULFIDINE) 500 MG tablet Take 2 tablets by mouth 4 times daily  Patient taking differently: Take 1,000 mg by mouth 4 times daily Does not take 4 times a day 2/15/23 3/17/23 Yes Enrique Camara MD   pantoprazole (PROTONIX) 40 MG tablet Take 1 tablet by mouth daily 1/18/23  Yes Romana Watts MD   ferrous sulfate (IRON 325) 325 (65 Fe) MG tablet Take 1 tablet by mouth 2 times daily  Patient taking differently: Take 325 mg by mouth 2 times daily For the last 5 days taking one a day 10/6/20  Yes Kristy Neither, APRN - CNP      Allergies: No Known Allergies  Past Medical History:   Diagnosis Date    Cholelithiasis 01/02/2023    See CT    Fatty liver 01/02/2023    See CT    JACQUI (iron deficiency anemia) 10/16/2018    Inguinal lymphadenopathy 01/02/2023    Left, see CT    Malnutrition ([address] 01/05/2023    Non-functioning kidney 01/02/2023    See CT    Ulcerative colitis ([facility] [address] 01/25/2019    Ulcerative esophagitis 01/05/2023     Past Surgical History:   Procedure Laterality Date    COLONOSCOPY N/A 01/25/2019    Pan ulcerative colitis    COLONOSCOPY N/A 01/05/2023    Pan ulcerative colitis    EXTERNAL EAR SURGERY      TYMPANOSTOMY TUBE PLACEMENT      X 2    UPPER GASTROINTESTINAL ENDOSCOPY N/A 01/25/2019    Normal    UPPER GASTROINTESTINAL ENDOSCOPY N/A 01/05/2023    Ulcerative Esophagitis     Social:   Social History     Tobacco Use    Smoking status: Never    Smokeless tobacco: Never   Substance Use Topics    Alcohol use: No     Family:   Family History   Problem Relation Age of Onset    Diabetes Mother     Other Father         GERD    Ulcerative Colitis Father         Required colectomy after failing Remicade       ROS: Pertinent items are noted in HPI, Past Medical/Surgical History    Objective:   Vital Signs:  /80   Pulse (!) 123   Ht 5' 2\" (1.575 m)   Wt 114 lb (51.7 kg)   SpO2 97%   BMI 20.85 kg/m²      Physical Exam:   General appearance: alert, appears stated age, cooperative, and no distress  Lungs: clear to auscultation bilaterally  Heart: regular rate and rhythm, S1, S2 normal, no murmur, click, rub or gallop  Abdomen: soft, non-tender; bowel sounds normal; no masses,  no organomegaly  Extremities: Trace right, 1+ left pedal edema    Lab and Imaging Review   Lab Results   Component Value Date    WBC 6.3 01/07/2023    HGB 9.6 (L) 01/07/2023    MCV 70.0 (L) 01/07/2023     01/07/2023     01/07/2023    K 4.3 01/07/2023     01/07/2023    CO2 20 (L) 01/07/2023    BUN 19 01/07/2023    CREATININE 1.4 (H) 01/07/2023    GLUCOSE 125 (H) 01/07/2023    PROT 7.9 01/02/2023    LABALBU 3.5 (L) 02/10/2023    CALCIUM 8.6 01/07/2023    AST 26 01/02/2023    ALT 15 01/02/2023    ALKPHOS 114 01/02/2023    BILITOT <0.2 01/02/2023    CHOL 152 01/18/2018    HDL 59 01/18/2018    LDLCALC 82 01/18/2018    TRIG 54 01/18/2018    LABA1C 5.2 02/15/2023    TSH 2.17 10/05/2020       Assessment:     Ulcerative Colitis with Iron Deficiency Anemia , Apparently less activity  Asymptomatic GERD    Plan:   Prednisone 20 mg AM on odd days; 40 mg (2x20) AM on even days  Continue Sulfasalazine 500mg x3, or hopefully x 4 per day  Continue FeSO4 once daily in AM  Continue once daily MVI  Patient declines use of steroid enemas  CBC Albumin BMP CRP in 4 weeks; Office visit 04/12/23  Finish proton pump inhibitor; do not refill    [address]  [phone]

## 2023-05-10 ENCOUNTER — OFFICE VISIT (OUTPATIENT)
Dept: INTERNAL MEDICINE CLINIC | Age: 33
End: 2023-05-10

## 2023-05-10 VITALS
OXYGEN SATURATION: 98 % | BODY MASS INDEX: 23.92 KG/M2 | SYSTOLIC BLOOD PRESSURE: 110 MMHG | DIASTOLIC BLOOD PRESSURE: 64 MMHG | WEIGHT: 130 LBS | HEART RATE: 112 BPM | HEIGHT: 62 IN

## 2023-05-10 DIAGNOSIS — N18.2 STAGE 2 CHRONIC KIDNEY DISEASE: ICD-10-CM

## 2023-05-10 DIAGNOSIS — E43 EDEMA DUE TO MALNUTRITION, DUE TO UNSPECIFIED MALNUTRITION TYPE (HCC): ICD-10-CM

## 2023-05-10 DIAGNOSIS — D50.0 IRON DEFICIENCY ANEMIA DUE TO CHRONIC BLOOD LOSS: ICD-10-CM

## 2023-05-10 DIAGNOSIS — K51.011 ULCERATIVE PANCOLITIS WITH RECTAL BLEEDING (HCC): Primary | ICD-10-CM

## 2023-05-10 PROCEDURE — 99213 OFFICE O/P EST LOW 20 MIN: CPT | Performed by: INTERNAL MEDICINE

## 2023-05-10 RX ORDER — PREDNISONE 1 MG/1
10 TABLET ORAL DAILY
Qty: 60 TABLET | Refills: 0 | Status: SHIPPED | OUTPATIENT
Start: 2023-05-10 | End: 2023-06-09

## 2023-05-10 RX ORDER — PREDNISONE 20 MG/1
TABLET ORAL
Qty: 30 TABLET | Refills: 0 | OUTPATIENT
Start: 2023-05-10

## 2023-05-10 RX ORDER — LANSOPRAZOLE 15 MG/1
15 CAPSULE, DELAYED RELEASE ORAL DAILY
Qty: 90 CAPSULE | Refills: 1 | Status: SHIPPED | OUTPATIENT
Start: 2023-05-10

## 2023-05-10 NOTE — PROGRESS NOTES
Gastroenterology Note  Patient:   Isai Doan   YOB: 1990   Facility:   [facility] clinic   Date:     5/10/2023  Consultant:   Lindsey Delgadillo MD, MD      Subjective:     27 y.o. male seen for Ulcerative Colitis. He reports 3-5 non liquid, non bloody stools daily. His energy levels have improved and he is more interested in life, sleeping better. With regards to weight, Review of available records reveals:  05/10/23 130 lb (59 kg)   04/12/23 115 lb (52.2 kg)   03/15/23 114 lb (51.7 kg)   02/15/23 121 lb (54.9 kg)   01/18/23 104 lb (47.2 kg)   01/11/23 100 lb (45.4 kg)   01/02/23 76 lb 4.5 oz (34.6 kg)       He is presently taking lansoprazole 15 mg / D. He reports return of heartburn if off medication for 3 days    Prior to Admission medications    Medication Sig Start Date End Date Taking?  Authorizing Provider   Multiple Vitamin (MULTIVITAMIN ADULT) TABS Take by mouth   Yes Historical Provider, MD   loperamide (IMODIUM) 2 MG capsule Take 1 capsule by mouth 4 times daily as needed for Diarrhea Taking for the last 2 weeks   Yes Historical Provider, MD   predniSONE (DELTASONE) 20 MG tablet Take 1 tablet by mouth daily Taking one a day for the last month 4/12/23  Yes Lindsey Delgadillo MD   sulfaSALAzine (AZULFIDINE) 500 MG tablet Take 2 tablets by mouth 4 times daily  Patient taking differently: Take 2 tablets by mouth 4 times daily Does not take 4 times a day, takes 3 times a day 2/15/23 5/10/23 Yes Lindsey Delgadillo MD   ferrous sulfate (IRON 325) 325 (65 Fe) MG tablet Take 1 tablet by mouth 2 times daily  Patient not taking: Reported on 4/12/2023 10/6/20   KATTY Bush - CNP      Allergies: No Known Allergies  Past Medical History:   Diagnosis Date    Cholelithiasis 01/02/2023    See CT    Fatty liver 01/02/2023    See CT    JACQUI (iron deficiency anemia) 10/16/2018    Inguinal lymphadenopathy 01/02/2023    Left, see CT    Malnutrition ([address]

## 2023-06-23 ENCOUNTER — OFFICE VISIT (OUTPATIENT)
Dept: FAMILY MEDICINE CLINIC | Age: 33
End: 2023-06-23

## 2023-06-23 VITALS
RESPIRATION RATE: 14 BRPM | BODY MASS INDEX: 26.13 KG/M2 | TEMPERATURE: 97 F | WEIGHT: 142 LBS | HEIGHT: 62 IN | OXYGEN SATURATION: 99 % | HEART RATE: 88 BPM | SYSTOLIC BLOOD PRESSURE: 100 MMHG | DIASTOLIC BLOOD PRESSURE: 64 MMHG

## 2023-06-23 DIAGNOSIS — Z13.220 SCREENING FOR LIPID DISORDERS: ICD-10-CM

## 2023-06-23 DIAGNOSIS — Z76.89 ENCOUNTER TO ESTABLISH CARE WITH NEW DOCTOR: Primary | ICD-10-CM

## 2023-06-23 DIAGNOSIS — Z87.898 HISTORY OF PREDIABETES: ICD-10-CM

## 2023-06-23 DIAGNOSIS — Z53.20 HIV SCREENING DECLINED: ICD-10-CM

## 2023-06-23 DIAGNOSIS — Z00.00 ANNUAL PHYSICAL EXAM: ICD-10-CM

## 2023-06-23 DIAGNOSIS — Z53.20 SCREENING FOR HEPATITIS C DECLINED: ICD-10-CM

## 2023-06-23 DIAGNOSIS — D50.9 MICROCYTIC ANEMIA: ICD-10-CM

## 2023-06-23 DIAGNOSIS — K51.818 OTHER ULCERATIVE COLITIS WITH OTHER COMPLICATION (HCC): ICD-10-CM

## 2023-06-23 SDOH — ECONOMIC STABILITY: FOOD INSECURITY: WITHIN THE PAST 12 MONTHS, THE FOOD YOU BOUGHT JUST DIDN'T LAST AND YOU DIDN'T HAVE MONEY TO GET MORE.: NEVER TRUE

## 2023-06-23 SDOH — ECONOMIC STABILITY: INCOME INSECURITY: HOW HARD IS IT FOR YOU TO PAY FOR THE VERY BASICS LIKE FOOD, HOUSING, MEDICAL CARE, AND HEATING?: NOT HARD AT ALL

## 2023-06-23 SDOH — ECONOMIC STABILITY: FOOD INSECURITY: WITHIN THE PAST 12 MONTHS, YOU WORRIED THAT YOUR FOOD WOULD RUN OUT BEFORE YOU GOT MONEY TO BUY MORE.: NEVER TRUE

## 2023-06-23 SDOH — ECONOMIC STABILITY: HOUSING INSECURITY
IN THE LAST 12 MONTHS, WAS THERE A TIME WHEN YOU DID NOT HAVE A STEADY PLACE TO SLEEP OR SLEPT IN A SHELTER (INCLUDING NOW)?: NO

## 2023-06-23 ASSESSMENT — PATIENT HEALTH QUESTIONNAIRE - PHQ9
1. LITTLE INTEREST OR PLEASURE IN DOING THINGS: 0
2. FEELING DOWN, DEPRESSED OR HOPELESS: 0
SUM OF ALL RESPONSES TO PHQ QUESTIONS 1-9: 0
SUM OF ALL RESPONSES TO PHQ9 QUESTIONS 1 & 2: 0

## 2023-06-23 ASSESSMENT — ANXIETY QUESTIONNAIRES
1. FEELING NERVOUS, ANXIOUS, OR ON EDGE: 0
2. NOT BEING ABLE TO STOP OR CONTROL WORRYING: 0
4. TROUBLE RELAXING: 0
7. FEELING AFRAID AS IF SOMETHING AWFUL MIGHT HAPPEN: 0
GAD7 TOTAL SCORE: 0
6. BECOMING EASILY ANNOYED OR IRRITABLE: 0
IF YOU CHECKED OFF ANY PROBLEMS ON THIS QUESTIONNAIRE, HOW DIFFICULT HAVE THESE PROBLEMS MADE IT FOR YOU TO DO YOUR WORK, TAKE CARE OF THINGS AT HOME, OR GET ALONG WITH OTHER PEOPLE: NOT DIFFICULT AT ALL
3. WORRYING TOO MUCH ABOUT DIFFERENT THINGS: 0
5. BEING SO RESTLESS THAT IT IS HARD TO SIT STILL: 0

## 2023-06-23 ASSESSMENT — ENCOUNTER SYMPTOMS
VOMITING: 0
CONSTIPATION: 0
ABDOMINAL PAIN: 0
DIARRHEA: 0
WHEEZING: 0
NAUSEA: 0
BACK PAIN: 0
SHORTNESS OF BREATH: 0
TROUBLE SWALLOWING: 0
COUGH: 0

## 2023-07-07 LAB
AVERAGE GLUCOSE: NORMAL
BASOPHILS ABSOLUTE: 0 /ΜL
BASOPHILS RELATIVE PERCENT: 0 %
CHOLESTEROL, TOTAL: 178 MG/DL
CHOLESTEROL/HDL RATIO: NORMAL
EOSINOPHILS ABSOLUTE: 0.2 /ΜL
EOSINOPHILS RELATIVE PERCENT: 2 %
HBA1C MFR BLD: 5.4 %
HCT VFR BLD CALC: 39.1 % (ref 41–53)
HDLC SERPL-MCNC: 67 MG/DL (ref 35–70)
HEMOGLOBIN: 11.2 G/DL (ref 13.5–17.5)
LDL CHOLESTEROL CALCULATED: 92 MG/DL (ref 0–160)
LYMPHOCYTES ABSOLUTE: 1.9 /ΜL
LYMPHOCYTES RELATIVE PERCENT: 19 %
MCH RBC QN AUTO: 20.8 PG
MCHC RBC AUTO-ENTMCNC: 28.6 G/DL
MCV RBC AUTO: 73 FL
MONOCYTES ABSOLUTE: 0.9 /ΜL
MONOCYTES RELATIVE PERCENT: 9 %
NEUTROPHILS ABSOLUTE: 6.6 /ΜL
NEUTROPHILS RELATIVE PERCENT: 70 %
NONHDLC SERPL-MCNC: NORMAL MG/DL
PDW BLD-RTO: 17.2 %
PLATELET # BLD: 311 K/ΜL
PMV BLD AUTO: ABNORMAL FL
RBC # BLD: 5.38 10^6/ΜL
TRIGL SERPL-MCNC: 107 MG/DL
VLDLC SERPL CALC-MCNC: 19 MG/DL
WBC # BLD: 9.7 10^3/ML

## 2023-07-10 LAB
A/G RATIO: NORMAL
ALBUMIN SERPL-MCNC: 4.5 G/DL
ALP BLD-CCNC: 0.1 U/L
ALT SERPL-CCNC: 10 U/L
AST SERPL-CCNC: 14 U/L
BILIRUB SERPL-MCNC: 0.2 MG/DL (ref 0.1–1.4)
BILIRUBIN DIRECT: NORMAL
BILIRUBIN, INDIRECT: NORMAL
BUN BLDV-MCNC: 21 MG/DL
CALCIUM SERPL-MCNC: 9.7 MG/DL
CHLORIDE BLD-SCNC: 106 MMOL/L
CO2: 21 MMOL/L
CREAT SERPL-MCNC: 1.43 MG/DL
EGFR: 66
GLOBULIN: NORMAL
GLUCOSE BLD-MCNC: 92 MG/DL
POTASSIUM SERPL-SCNC: 4.8 MMOL/L
PROTEIN TOTAL: 7 G/DL
SODIUM BLD-SCNC: 143 MMOL/L

## 2023-07-12 ENCOUNTER — OFFICE VISIT (OUTPATIENT)
Dept: INTERNAL MEDICINE CLINIC | Age: 33
End: 2023-07-12

## 2023-07-12 VITALS
WEIGHT: 142 LBS | OXYGEN SATURATION: 98 % | HEIGHT: 62 IN | DIASTOLIC BLOOD PRESSURE: 60 MMHG | BODY MASS INDEX: 26.13 KG/M2 | SYSTOLIC BLOOD PRESSURE: 100 MMHG | HEART RATE: 80 BPM

## 2023-07-12 DIAGNOSIS — D50.0 IRON DEFICIENCY ANEMIA DUE TO CHRONIC BLOOD LOSS: ICD-10-CM

## 2023-07-12 DIAGNOSIS — N18.2 STAGE 2 CHRONIC KIDNEY DISEASE: ICD-10-CM

## 2023-07-12 DIAGNOSIS — K51.011 ULCERATIVE PANCOLITIS WITH RECTAL BLEEDING (HCC): Primary | ICD-10-CM

## 2023-07-12 PROCEDURE — 99214 OFFICE O/P EST MOD 30 MIN: CPT | Performed by: INTERNAL MEDICINE

## 2023-07-12 RX ORDER — PREDNISONE 5 MG/1
5 TABLET ORAL DAILY
Qty: 30 TABLET | Refills: 0 | Status: SHIPPED | OUTPATIENT
Start: 2023-07-12 | End: 2023-08-11

## 2023-08-09 ENCOUNTER — OFFICE VISIT (OUTPATIENT)
Dept: INTERNAL MEDICINE CLINIC | Age: 33
End: 2023-08-09

## 2023-08-09 VITALS
DIASTOLIC BLOOD PRESSURE: 74 MMHG | HEIGHT: 62 IN | WEIGHT: 140 LBS | HEART RATE: 84 BPM | SYSTOLIC BLOOD PRESSURE: 120 MMHG | BODY MASS INDEX: 25.76 KG/M2 | OXYGEN SATURATION: 98 %

## 2023-08-09 DIAGNOSIS — E43 EDEMA DUE TO MALNUTRITION, DUE TO UNSPECIFIED MALNUTRITION TYPE (HCC): ICD-10-CM

## 2023-08-09 DIAGNOSIS — N18.2 STAGE 2 CHRONIC KIDNEY DISEASE: ICD-10-CM

## 2023-08-09 DIAGNOSIS — D50.0 IRON DEFICIENCY ANEMIA DUE TO CHRONIC BLOOD LOSS: ICD-10-CM

## 2023-08-09 DIAGNOSIS — K51.011 ULCERATIVE PANCOLITIS WITH RECTAL BLEEDING (HCC): Primary | ICD-10-CM

## 2023-08-09 PROCEDURE — 99214 OFFICE O/P EST MOD 30 MIN: CPT | Performed by: INTERNAL MEDICINE

## 2023-08-09 RX ORDER — PREDNISONE 5 MG/1
10 TABLET ORAL DAILY
Qty: 60 TABLET | Refills: 0 | Status: SHIPPED
Start: 2023-08-09 | End: 2023-09-08

## 2023-08-09 NOTE — PROGRESS NOTES
Gastroenterology Note  Patient:   Kristen Hawk   YOB: 1990   Facility:   [facility] clinic   Date:     8/9/2023  Consultant:   Ally Fontana MD, MD      Subjective:     27 y.o. male seen for Ulcerative Colitis  At last visit, 4 weeks ago, his prednisone dose was decreased from 7.5 to 5 mg / D. He decreased his sulfasalazine to 2 gms / D. He reports that starting 4 days after most recent visit he started a flare: 6-8 diarrheal stools, rarely blood, significant tenesmus, sometimes incontinence. With regards to weight, he reports stable / unchanged. Review of available records reveals:  08/09/23 140 lb (63.5 kg)   07/12/23 142 lb (64.4 kg)   06/23/23 142 lb (64.4 kg)   06/14/23 138 lb (62.6 kg)   05/10/23 130 lb (59 kg)   04/12/23 115 lb (52.2 kg)   03/15/23 114 lb (51.7 kg)   02/15/23 121 lb (54.9 kg)   01/18/23 104 lb (47.2 kg)   01/11/23 100 lb (45.4 kg)   01/02/23 76 lb 4.5 oz (34.6 kg)       Prior to Admission medications    Medication Sig Start Date End Date Taking?  Authorizing Provider   predniSONE (DELTASONE) 5 MG tablet Take 1 tablet by mouth daily 7/12/23 8/11/23 Yes Ally Fontana MD   lansoprazole (PREVACID) 15 MG delayed release capsule Take 1 capsule by mouth daily 5/10/23  Yes Ally Fontana MD   Multiple Vitamin (MULTIVITAMIN ADULT) TABS Take by mouth   Yes Historical Provider, MD   loperamide (IMODIUM) 2 MG capsule Take 1 capsule by mouth 4 times daily as needed for Diarrhea Taking for the last 2 weeks   Yes Historical Provider, MD   sulfaSALAzine (AZULFIDINE) 500 MG tablet Take 2 tablets by mouth 4 times daily  Patient taking differently: Take 2 tablets by mouth 4 times daily Takes twice a day 2/15/23 8/9/23 Yes Ally Fontana MD   ferrous sulfate (IRON 325) 325 (65 Fe) MG tablet Take 1 tablet by mouth 2 times daily  Patient not taking: Reported on 7/12/2023 10/6/20   Lizett Lu, KATTY - CNP      Allergies: No Known

## 2023-09-13 ENCOUNTER — OFFICE VISIT (OUTPATIENT)
Dept: INTERNAL MEDICINE CLINIC | Age: 33
End: 2023-09-13
Payer: COMMERCIAL

## 2023-09-13 VITALS
SYSTOLIC BLOOD PRESSURE: 100 MMHG | HEIGHT: 62 IN | OXYGEN SATURATION: 99 % | WEIGHT: 137 LBS | DIASTOLIC BLOOD PRESSURE: 60 MMHG | BODY MASS INDEX: 25.21 KG/M2 | HEART RATE: 89 BPM

## 2023-09-13 DIAGNOSIS — N18.2 STAGE 2 CHRONIC KIDNEY DISEASE: ICD-10-CM

## 2023-09-13 DIAGNOSIS — D50.0 IRON DEFICIENCY ANEMIA DUE TO CHRONIC BLOOD LOSS: ICD-10-CM

## 2023-09-13 DIAGNOSIS — K51.011 ULCERATIVE PANCOLITIS WITH RECTAL BLEEDING (HCC): Primary | ICD-10-CM

## 2023-09-13 PROCEDURE — 99214 OFFICE O/P EST MOD 30 MIN: CPT | Performed by: INTERNAL MEDICINE

## 2023-09-13 RX ORDER — PREDNISONE 5 MG/1
10 TABLET ORAL DAILY
Qty: 60 TABLET | Refills: 0 | Status: SHIPPED | OUTPATIENT
Start: 2023-09-13 | End: 2023-10-13

## 2023-09-13 RX ORDER — HYDROCORTISONE 100 MG/60ML
100 SUSPENSION RECTAL NIGHTLY
Qty: 28 EACH | Refills: 0 | Status: SHIPPED | OUTPATIENT
Start: 2023-09-13 | End: 2023-10-11

## 2023-09-13 NOTE — PROGRESS NOTES
Gastroenterology Note  Patient:   Patricia Puentes   YOB: 1990   Facility:   [facility]   Date:     9/13/2023  Consultant:   Hazel Cardenas MD, MD      Subjective:     27 y.o. male seen for ulcerative colitis. At last visit, 5 weeks ago, prednisone was increased to 10 mg/D and sulfasalazine to 3 gms. He reports ~28 loose stools per week, without gross blood, with significant urgency, with incontinence. He denies fatigue fatigue and is able to work 8 hours as . Pyrosis is not an issue    Prior to Admission medications    Medication Sig Start Date End Date Taking?  Authorizing Provider   lansoprazole (PREVACID) 15 MG delayed release capsule Take 1 capsule by mouth daily 5/10/23  Yes Hazel Cardenas MD   Multiple Vitamin (MULTIVITAMIN ADULT) TABS Take by mouth   Yes Historical Provider, MD   loperamide (IMODIUM) 2 MG capsule Take 1 capsule by mouth 4 times daily as needed for Diarrhea Taking for the last 2 weeks   Yes Historical Provider, MD   sulfaSALAzine (AZULFIDINE) 500 MG tablet Take 2 tablets by mouth 4 times daily  Patient taking differently: Take 2 tablets by mouth 4 times daily Taking 3 pills twice a day 2/15/23 9/13/23 Yes Hazel Cardenas MD   ferrous sulfate (IRON 325) 325 (65 Fe) MG tablet Take 1 tablet by mouth 2 times daily  Patient not taking: Reported on 7/12/2023 10/6/20   KATTY Kuo - CNP      Allergies: No Known Allergies  Past Medical History:   Diagnosis Date    Cholelithiasis 01/02/2023    See CT    Colitis     Fatty liver 01/02/2023    See CT    JACQUI (iron deficiency anemia) 10/16/2018    Inguinal lymphadenopathy 01/02/2023    Left, see CT    Malnutrition ([address]) 01/05/2023    Non-functioning kidney 01/02/2023    See CT    Ulcerative colitis ([address]) 01/25/2019    Ulcerative esophagitis 01/05/2023     Past Surgical History:   Procedure Laterality Date    COLONOSCOPY N/A 01/25/2019    Pan ulcerative colitis

## 2023-09-27 ENCOUNTER — TELEPHONE (OUTPATIENT)
Dept: INTERNAL MEDICINE CLINIC | Age: 33
End: 2023-09-27

## 2023-09-27 DIAGNOSIS — K51.018 ULCERATIVE PANCOLITIS WITH OTHER COMPLICATION (HCC): Primary | ICD-10-CM

## 2023-09-27 NOTE — TELEPHONE ENCOUNTER
Not doing well with Steroid therapy     Spoke to Dr. Moses Nichols and [facility]

## 2023-10-11 ENCOUNTER — OFFICE VISIT (OUTPATIENT)
Dept: INTERNAL MEDICINE CLINIC | Age: 33
End: 2023-10-11

## 2023-10-11 VITALS — HEIGHT: 62 IN | WEIGHT: 136 LBS | BODY MASS INDEX: 25.03 KG/M2

## 2023-10-11 DIAGNOSIS — D50.0 IRON DEFICIENCY ANEMIA DUE TO CHRONIC BLOOD LOSS: ICD-10-CM

## 2023-10-11 DIAGNOSIS — N18.2 STAGE 2 CHRONIC KIDNEY DISEASE: ICD-10-CM

## 2023-10-11 DIAGNOSIS — K51.018 ULCERATIVE PANCOLITIS WITH OTHER COMPLICATION (HCC): Primary | ICD-10-CM

## 2023-10-11 PROCEDURE — 99214 OFFICE O/P EST MOD 30 MIN: CPT | Performed by: INTERNAL MEDICINE

## 2023-10-11 RX ORDER — PREDNISONE 5 MG/1
10 TABLET ORAL DAILY
Qty: 60 TABLET | Refills: 2 | Status: SHIPPED | OUTPATIENT
Start: 2023-10-11 | End: 2024-01-09

## 2023-10-11 NOTE — PROGRESS NOTES
Gastroenterology Note  Patient:   Rosetta King   YOB: 1990   Facility:   [facility]   Date:     10/11/2023  Consultant:   Meri Santiago MD, MD      Subjective:     27 y.o. male seen for follow up of Ulcerative Colitis . At last visit, 4 weeks ago, prednisone was continued at 10 mg/D and sulfasalazine to 3 gms. He reports continued  loose stools, perhaps ~25,  per week, without gross blood, with less urgency, with incontinence. He denies fatigue fatigue and is able to work 8 hours as . He could not afford cortenemas. With regards to weight, he reports stable / unchanged. Review of available records reveals:  10/11/23 136 lb (61.7 kg)   09/13/23 137 lb (62.1 kg)   08/09/23 140 lb (63.5 kg)   07/12/23 142 lb (64.4 kg)   06/23/23 142 lb (64.4 kg)   06/14/23 138 lb (62.6 kg)   05/10/23 130 lb (59 kg)   04/12/23 115 lb (52.2 kg)   03/15/23 114 lb (51.7 kg)   02/15/23 121 lb (54.9 kg)   01/18/23 104 lb (47.2 kg)   01/11/23 100 lb (45.4 kg)   01/02/23 76 lb 4.5 oz (34.6 kg)   10/05/20 105 lb (47.6 kg)         Prior to Admission medications    Medication Sig Start Date End Date Taking?  Authorizing Provider   predniSONE (DELTASONE) 5 MG tablet Take 2 tablets by mouth daily 9/13/23 10/13/23 Yes Meri Santiago MD   lansoprazole (PREVACID) 15 MG delayed release capsule Take 1 capsule by mouth daily 5/10/23  Yes Meri Santiago MD   Multiple Vitamin (MULTIVITAMIN ADULT) TABS Take by mouth   Yes ProviderSandra MD   loperamide (IMODIUM) 2 MG capsule Take 1 capsule by mouth 4 times daily as needed for Diarrhea Taking for the last 2 weeks   Yes Sandra Burleson MD   sulfaSALAzine (AZULFIDINE) 500 MG tablet Take 2 tablets by mouth 4 times daily  Patient taking differently: Take 2 tablets by mouth 4 times daily Taking 6 in the morning before he goes to work 2/15/23 9/13/23  Meri Santiago MD   ferrous sulfate (IRON 325) 325 (65 Fe) MG tablet

## 2023-10-26 NOTE — PROGRESS NOTES
Report called to inpatient RN, pt name placed in transport system waiting for transport to take pt back to inpatient room BUE 4/5, HIPS 3-/5, Rt knee 3/5, Lt knee 4-/5. Lt ankle 3-/5

## 2023-11-08 ENCOUNTER — OFFICE VISIT (OUTPATIENT)
Dept: INTERNAL MEDICINE CLINIC | Age: 33
End: 2023-11-08

## 2023-11-08 VITALS
DIASTOLIC BLOOD PRESSURE: 60 MMHG | BODY MASS INDEX: 26.06 KG/M2 | SYSTOLIC BLOOD PRESSURE: 100 MMHG | WEIGHT: 138 LBS | HEIGHT: 61 IN

## 2023-11-08 DIAGNOSIS — K51.018 ULCERATIVE PANCOLITIS WITH OTHER COMPLICATION (HCC): Primary | ICD-10-CM

## 2023-11-08 DIAGNOSIS — N18.2 STAGE 2 CHRONIC KIDNEY DISEASE: ICD-10-CM

## 2023-11-08 PROCEDURE — 99213 OFFICE O/P EST LOW 20 MIN: CPT | Performed by: INTERNAL MEDICINE

## 2023-11-08 RX ORDER — PREDNISONE 5 MG/1
5 TABLET ORAL DAILY
Qty: 30 TABLET | Refills: 0 | Status: SHIPPED | OUTPATIENT
Start: 2023-11-08 | End: 2023-12-08

## 2023-11-08 NOTE — PROGRESS NOTES
CKD: Stabl      Plan:   Patient does not wish to start entyvio at present. He believes that he can taper off prednisone and \"be fine. \" While I doubt this is the case, he has agreed that if he flares witha  taper, then we start entyvio. Despite previous agreement to do so, he has not taken iron.   3. Labs before RTC 4 weeks      [address] [facility], [address] [facility]  [phone]

## 2023-11-14 ENCOUNTER — TELEPHONE (OUTPATIENT)
Dept: INTERNAL MEDICINE CLINIC | Age: 33
End: 2023-11-14

## 2023-11-14 NOTE — TELEPHONE ENCOUNTER
Mail order pharmacy called in asking for clarification on the dosing for Entyvio, stating it was not a complete Rx. I informed them Pt was not starting the Entyvio according to chart notes and they stated if he does decide to start the medication a new Rx would need to be sent.

## 2023-12-02 LAB
BASOPHILS ABSOLUTE: 0 /ΜL
BASOPHILS RELATIVE PERCENT: 1 %
EOSINOPHILS ABSOLUTE: 0.2 /ΜL
EOSINOPHILS RELATIVE PERCENT: 2 %
HCT VFR BLD CALC: 40.7 % (ref 41–53)
HEMOGLOBIN: 11.9 G/DL (ref 13.5–17.5)
LYMPHOCYTES ABSOLUTE: 2.2 /ΜL
LYMPHOCYTES RELATIVE PERCENT: 29 %
MCH RBC QN AUTO: 21.8 PG
MCHC RBC AUTO-ENTMCNC: 28.3 G/DL
MCV RBC AUTO: 75 FL
MONOCYTES ABSOLUTE: 0.8 /ΜL
MONOCYTES RELATIVE PERCENT: 10 %
NEUTROPHILS ABSOLUTE: 4.4 /ΜL
NEUTROPHILS RELATIVE PERCENT: 58 %
PLATELET # BLD: 352 K/ΜL
PMV BLD AUTO: ABNORMAL FL
RBC # BLD: 5.45 10^6/ΜL
WBC # BLD: 7.8 10^3/ML

## 2023-12-06 ENCOUNTER — OFFICE VISIT (OUTPATIENT)
Dept: INTERNAL MEDICINE CLINIC | Age: 33
End: 2023-12-06

## 2023-12-06 VITALS
DIASTOLIC BLOOD PRESSURE: 60 MMHG | BODY MASS INDEX: 24.17 KG/M2 | WEIGHT: 128 LBS | SYSTOLIC BLOOD PRESSURE: 100 MMHG | HEIGHT: 61 IN

## 2023-12-06 DIAGNOSIS — N18.2 STAGE 2 CHRONIC KIDNEY DISEASE: ICD-10-CM

## 2023-12-06 DIAGNOSIS — K51.018 ULCERATIVE PANCOLITIS WITH OTHER COMPLICATION (HCC): Primary | ICD-10-CM

## 2023-12-06 DIAGNOSIS — D50.0 IRON DEFICIENCY ANEMIA DUE TO CHRONIC BLOOD LOSS: ICD-10-CM

## 2023-12-06 PROCEDURE — 99214 OFFICE O/P EST MOD 30 MIN: CPT | Performed by: INTERNAL MEDICINE

## 2024-02-02 ENCOUNTER — TELEPHONE (OUTPATIENT)
Dept: INTERNAL MEDICINE CLINIC | Age: 34
End: 2024-02-02

## 2024-02-02 RX ORDER — PREDNISONE 5 MG/1
TABLET ORAL
Qty: 30 TABLET | Refills: 0 | OUTPATIENT
Start: 2024-02-02

## 2024-02-02 NOTE — TELEPHONE ENCOUNTER
Pt's mother called in asking for a refill on the patients prednisone. Refill was also in the inbox from pharm. I denied it do to chart from 12/6/23 Dr. Medina stated Patient was to STOP taking prednisone at that time. Left message on patient cell phone VM.

## 2024-02-05 ENCOUNTER — TELEPHONE (OUTPATIENT)
Dept: INTERNAL MEDICINE CLINIC | Age: 34
End: 2024-02-05

## 2024-02-05 NOTE — TELEPHONE ENCOUNTER
I spoke to Dr. Medina and he would like to see him in the clinic this Wednesday. Message left on mother's voicemail

## 2024-02-05 NOTE — TELEPHONE ENCOUNTER
Jerod is having problems with his bowls. Asking if he can get back on Prednisone because he states if he does not have it he can not go to work due to accidents. Mother states he has a poor diet.

## 2024-02-06 NOTE — TELEPHONE ENCOUNTER
I spoke to Dr. Medina and he would like to see him in the clinic.   Called mom's phone and his phone and no answer or VM set up    Thursday at 12:15 AM

## 2024-02-08 ENCOUNTER — OFFICE VISIT (OUTPATIENT)
Dept: INTERNAL MEDICINE CLINIC | Age: 34
End: 2024-02-08

## 2024-02-08 VITALS
HEIGHT: 61 IN | DIASTOLIC BLOOD PRESSURE: 60 MMHG | SYSTOLIC BLOOD PRESSURE: 90 MMHG | WEIGHT: 128 LBS | HEART RATE: 72 BPM | OXYGEN SATURATION: 100 % | BODY MASS INDEX: 24.17 KG/M2

## 2024-02-08 DIAGNOSIS — K51.018 ULCERATIVE PANCOLITIS WITH OTHER COMPLICATION (HCC): Primary | ICD-10-CM

## 2024-02-08 DIAGNOSIS — D50.0 IRON DEFICIENCY ANEMIA DUE TO CHRONIC BLOOD LOSS: ICD-10-CM

## 2024-02-08 DIAGNOSIS — N18.2 STAGE 2 CHRONIC KIDNEY DISEASE: ICD-10-CM

## 2024-02-08 PROCEDURE — 99214 OFFICE O/P EST MOD 30 MIN: CPT | Performed by: INTERNAL MEDICINE

## 2024-02-08 RX ORDER — PREDNISONE 2.5 MG/1
2.5 TABLET ORAL DAILY
Qty: 30 TABLET | Refills: 2 | Status: SHIPPED | OUTPATIENT
Start: 2024-02-08 | End: 2024-05-08

## 2024-02-08 RX ORDER — PREDNISONE 5 MG/1
2.5 TABLET ORAL DAILY
Qty: 30 TABLET | Refills: 1 | Status: CANCELLED | OUTPATIENT
Start: 2024-02-08

## 2024-02-08 RX ORDER — PREDNISONE 5 MG/1
TABLET ORAL
COMMUNITY
Start: 2024-01-18 | End: 2024-02-08 | Stop reason: DRUGHIGH

## 2024-02-08 NOTE — PROGRESS NOTES
56.2 kg (124 lb)   05/24/18 58 kg (127 lb 12.8 oz)   05/17/18 57.5 kg (126 lb 12.8 oz)   04/16/18 59.9 kg (132 lb)   04/09/18 58.5 kg (129 lb)   01/18/18 64.9 kg (143 lb)   06/05/17 78.9 kg (174 lb)   06/09/16 78.9 kg (174 lb)   03/16/16 75.8 kg (167 lb)   01/14/16 72.6 kg (160 lb)   01/14/13 79.3 kg (174 lb 12.8 oz)   06/04/12 72.1 kg (159 lb)   05/21/12 70.8 kg (156 lb)   08/19/10 85.7 kg (189 lb)       Prior to Admission medications    Medication Sig Start Date End Date Taking? Authorizing Provider   predniSONE (DELTASONE) 5 MG tablet TAKE 4 TABLETS BY MOUTH ONCE DAILY 1/18/24  Yes Sandra Burleson MD   sulfaSALAzine (AZULFIDINE) 500 MG tablet Take 2 tablets by mouth 4 times daily Taking 6 in the morning before he goes to work 12/12/23 6/9/24 Yes Macho Nunn MD   lansoprazole (PREVACID) 15 MG delayed release capsule Take 1 capsule by mouth daily 5/10/23  Yes Lewis Medina MD   loperamide (IMODIUM) 2 MG capsule Take 1 capsule by mouth 4 times daily as needed for Diarrhea Taking for the last 2 weeks   Yes Sandra Burleson MD   Multiple Vitamin (MULTIVITAMIN ADULT) TABS Take by mouth  Patient not taking: Reported on 2/8/2024    Sandra Burleson MD      Allergies: No Known Allergies  Past Medical History:   Diagnosis Date    Cholelithiasis 01/02/2023    See CT    Colitis     Fatty liver 01/02/2023    See CT    JACQUI (iron deficiency anemia) 10/16/2018    Inguinal lymphadenopathy 01/02/2023    Left, see CT    Malnutrition (HCC) 01/05/2023    Non-functioning kidney 01/02/2023    See CT    Ulcerative colitis (HCC) 01/25/2019    Ulcerative esophagitis 01/05/2023     Past Surgical History:   Procedure Laterality Date    COLONOSCOPY N/A 01/25/2019    Pan ulcerative colitis    COLONOSCOPY N/A 01/05/2023    Pan ulcerative colitis    EXTERNAL EAR SURGERY      TYMPANOSTOMY TUBE PLACEMENT      X 2    UPPER GASTROINTESTINAL ENDOSCOPY N/A 01/25/2019    Normal    UPPER GASTROINTESTINAL ENDOSCOPY N/A

## 2024-03-06 ENCOUNTER — OFFICE VISIT (OUTPATIENT)
Dept: INTERNAL MEDICINE CLINIC | Age: 34
End: 2024-03-06

## 2024-03-06 VITALS
DIASTOLIC BLOOD PRESSURE: 60 MMHG | HEIGHT: 61 IN | BODY MASS INDEX: 23.98 KG/M2 | SYSTOLIC BLOOD PRESSURE: 90 MMHG | WEIGHT: 127 LBS

## 2024-03-06 DIAGNOSIS — K51.018 ULCERATIVE PANCOLITIS WITH OTHER COMPLICATION (HCC): Primary | ICD-10-CM

## 2024-03-06 PROCEDURE — 99214 OFFICE O/P EST MOD 30 MIN: CPT | Performed by: INTERNAL MEDICINE

## 2024-03-06 RX ORDER — PREDNISONE 5 MG/1
15 TABLET ORAL DAILY
Qty: 90 TABLET | Refills: 1 | Status: SHIPPED | OUTPATIENT
Start: 2024-03-06 | End: 2024-05-05

## 2024-03-06 NOTE — PROGRESS NOTES
mouth 4 times daily Taking 6 in the morning before he goes to work 12/12/23 6/9/24 Yes Macho Nunn MD   lansoprazole (PREVACID) 15 MG delayed release capsule Take 1 capsule by mouth daily 5/10/23  Yes Lewis Medina MD   loperamide (IMODIUM) 2 MG capsule Take 1 capsule by mouth 4 times daily as needed for Diarrhea Taking for the last 2 weeks   Yes Provider, MD Sandra      Allergies: No Known Allergies  Past Medical History:   Diagnosis Date    Cholelithiasis 01/02/2023    See CT    Colitis     Fatty liver 01/02/2023    See CT    JACQUI (iron deficiency anemia) 10/16/2018    Inguinal lymphadenopathy 01/02/2023    Left, see CT    Malnutrition (HCC) 01/05/2023    Non-functioning kidney 01/02/2023    See CT    Ulcerative colitis (HCC) 01/25/2019    Ulcerative esophagitis 01/05/2023     Past Surgical History:   Procedure Laterality Date    COLONOSCOPY N/A 01/25/2019    Pan ulcerative colitis    COLONOSCOPY N/A 01/05/2023    Pan ulcerative colitis    EXTERNAL EAR SURGERY      TYMPANOSTOMY TUBE PLACEMENT      X 2    UPPER GASTROINTESTINAL ENDOSCOPY N/A 01/25/2019    Normal    UPPER GASTROINTESTINAL ENDOSCOPY N/A 01/05/2023    Ulcerative Esophagitis     Social:   Social History     Tobacco Use    Smoking status: Never    Smokeless tobacco: Never   Substance Use Topics    Alcohol use: No     Family:   Family History   Problem Relation Age of Onset    Diabetes Mother     Other Father         GERD    Ulcerative Colitis Father         Required colectomy after failing Remicade    Breast Cancer Maternal Grandmother     Diabetes type 2  Maternal Grandfather     Hypertension Maternal Grandfather        ROS: Pertinent items are noted in HPI, Past Medical/Surgical History    Objective:   Vital Signs:  BP 90/60 (Site: Left Upper Arm, Position: Sitting)   Ht 1.549 m (5' 1\")   Wt 57.6 kg (127 lb)   BMI 24.00 kg/m²      Physical Exam:   General appearance: alert, appears stated age, cooperative, and no distress  Lungs:

## 2024-03-18 ENCOUNTER — TELEPHONE (OUTPATIENT)
Dept: INTERNAL MEDICINE CLINIC | Age: 34
End: 2024-03-18

## 2024-03-18 DIAGNOSIS — K51.00 CHRONIC PANCOLONIC ULCERATIVE COLITIS (HCC): Primary | ICD-10-CM

## 2024-03-20 ENCOUNTER — TELEPHONE (OUTPATIENT)
Dept: INTERNAL MEDICINE CLINIC | Age: 34
End: 2024-03-20

## 2024-03-20 NOTE — TELEPHONE ENCOUNTER
Ruth from the infusion center left a message that Jerod is refusing to get the infusion now. I called and left Jerod to call the clinic.

## 2024-04-17 ENCOUNTER — TELEPHONE (OUTPATIENT)
Dept: INTERNAL MEDICINE CLINIC | Age: 34
End: 2024-04-17

## 2024-04-17 NOTE — TELEPHONE ENCOUNTER
Rep for Ent[facility] called asking about shipping out medication. I explained that jerod has not had the first one yet. They are putting a hold on the shipment for now. If Jerod decides to get the infusion we will need to call [phone]

## 2024-04-22 RX ORDER — PREDNISONE 5 MG/1
15 TABLET ORAL DAILY
Qty: 90 TABLET | Refills: 0 | Status: SHIPPED | OUTPATIENT
Start: 2024-04-22 | End: 2024-06-21

## 2024-04-22 NOTE — TELEPHONE ENCOUNTER
Requested Prescriptions     Pending Prescriptions Disp Refills    predniSONE (DELTASONE) 5 MG tablet 90 tablet 1     Sig: Take 3 tablets by mouth daily      Will schedule a follow up with Dr. Medina.

## 2024-05-29 RX ORDER — PREDNISONE 5 MG/1
15 TABLET ORAL DAILY
Qty: 90 TABLET | Refills: 0 | Status: SHIPPED | OUTPATIENT
Start: 2024-05-29 | End: 2024-07-28

## 2024-05-29 RX ORDER — PREDNISONE 5 MG/1
15 TABLET ORAL DAILY
Qty: 90 TABLET | Refills: 0 | OUTPATIENT
Start: 2024-05-29

## 2024-06-25 DIAGNOSIS — K51.018 ULCERATIVE PANCOLITIS WITH OTHER COMPLICATION (HCC): ICD-10-CM

## 2024-06-25 DIAGNOSIS — N18.2 STAGE 2 CHRONIC KIDNEY DISEASE: ICD-10-CM

## 2024-06-25 DIAGNOSIS — D50.0 IRON DEFICIENCY ANEMIA DUE TO CHRONIC BLOOD LOSS: ICD-10-CM

## 2024-06-26 RX ORDER — PREDNISONE 5 MG/1
15 TABLET ORAL DAILY
Qty: 90 TABLET | Refills: 0 | Status: SHIPPED | OUTPATIENT
Start: 2024-06-26 | End: 2024-08-25

## 2024-06-26 NOTE — TELEPHONE ENCOUNTER
6/26/2024  CBC & Creqt nonted  Will review in 1 week  MICHELLE Medina Jr. MD       [facility]  [phone]

## 2024-07-03 ENCOUNTER — OFFICE VISIT (OUTPATIENT)
Dept: INTERNAL MEDICINE CLINIC | Age: 34
End: 2024-07-03

## 2024-07-03 VITALS
WEIGHT: 148 LBS | SYSTOLIC BLOOD PRESSURE: 133 MMHG | OXYGEN SATURATION: 100 % | HEIGHT: 61 IN | BODY MASS INDEX: 27.94 KG/M2 | HEART RATE: 97 BPM | DIASTOLIC BLOOD PRESSURE: 80 MMHG

## 2024-07-03 DIAGNOSIS — D50.0 IRON DEFICIENCY ANEMIA DUE TO CHRONIC BLOOD LOSS: ICD-10-CM

## 2024-07-03 DIAGNOSIS — K51.00 CHRONIC PANCOLONIC ULCERATIVE COLITIS (HCC): Primary | ICD-10-CM

## 2024-07-03 PROCEDURE — 99214 OFFICE O/P EST MOD 30 MIN: CPT | Performed by: INTERNAL MEDICINE

## 2024-07-03 NOTE — PROGRESS NOTES
Gastroenterology Note  Patient:   Lewis Reddy   YOB: 1990   Facility:   [facility]   Date:     7/3/2024  Consultant:   Lewis Medina MD, MD      Subjective:     33 y.o. male seen for Ulcerative Colitis  His last visit was 4 months ago at which time he was having 10 BM / D. Prednisone was increased to 1`5 mg per day. He was to start biologic, which he did not do. He was to follow up in 4 weeks which he did not do.      He reports that on no sulfasalazine and prednisone 15 mg / D he has had no diarrhea and no blood. With regards to weight, he reports gained 10+ lbs. Review of available records reveals:  07/03/24 67.1 kg (148 lb)   03/06/24 57.6 kg (127 lb)   02/08/24 58.1 kg (128 lb)   12/06/23 58.1 kg (128 lb)   11/08/23 62.6 kg (138 lb)   10/11/23 61.7 kg (136 lb)   09/13/23 62.1 kg (137 lb)   08/09/23 63.5 kg (140 lb)   07/12/23 64.4 kg (142 lb)   06/23/23 64.4 kg (142 lb)   06/14/23 62.6 kg (138 lb)   05/10/23 59 kg (130 lb)   04/12/23 52.2 kg (115 lb)   03/15/23 51.7 kg (114 lb)   02/15/23 54.9 kg (121 lb)   01/18/23 47.2 kg (104 lb)   01/11/23 45.4 kg (100 lb)   01/02/23 34.6 kg (76 lb 4.5 oz)   10/05/20 47.6 kg (105 lb)   12/04/19 51.3 kg (113 lb)   04/15/19 47.8 kg (105 lb 6.4 oz)   03/04/19 48.5 kg (107 lb)   01/25/19 50.8 kg (112 lb)   01/22/19 53.2 kg (117 lb 3.2 oz)   01/16/19 54 kg (119 lb)   10/16/18 56.2 kg (124 lb)   05/24/18 58 kg (127 lb 12.8 oz)   05/17/18 57.5 kg (126 lb 12.8 oz)   04/16/18 59.9 kg (132 lb)   04/09/18 58.5 kg (129 lb)   01/18/18 64.9 kg (143 lb)   06/05/17 78.9 kg (174 lb)   06/09/16 78.9 kg (174 lb)   03/16/16 75.8 kg (167 lb)   01/14/16 72.6 kg (160 lb)   01/14/13 79.3 kg (174 lb 12.8 oz)   06/04/12 72.1 kg (159 lb)   05/21/12 70.8 kg (156 lb)   08/19/10 85.7 kg (189 lb)     ++++++++++++++++++++++++++++    Prior to Admission medications    Medication Sig Start Date End Date Taking? Authorizing Provider

## 2024-07-09 ENCOUNTER — TELEPHONE (OUTPATIENT)
Dept: INTERNAL MEDICINE CLINIC | Age: 34
End: 2024-07-09

## 2024-07-09 NOTE — TELEPHONE ENCOUNTER
Jerod called into the clinic and states he is ready to start the Entvyo infusions. I called the Pharmacy and they have 0, and 2. I spoke to Ruth at the infusion center and she will also reach out to Jerod to get him scheduled.

## 2024-07-12 ENCOUNTER — HOSPITAL ENCOUNTER (OUTPATIENT)
Dept: NURSING | Age: 34
Setting detail: INFUSION SERIES
Discharge: HOME | End: 2024-07-12
Payer: COMMERCIAL

## 2024-07-12 VITALS
HEART RATE: 70 BPM | SYSTOLIC BLOOD PRESSURE: 116 MMHG | RESPIRATION RATE: 16 BRPM | TEMPERATURE: 96.8 F | BODY MASS INDEX: 27.94 KG/M2 | HEIGHT: 61 IN | WEIGHT: 148 LBS | DIASTOLIC BLOOD PRESSURE: 73 MMHG | OXYGEN SATURATION: 100 %

## 2024-07-12 DIAGNOSIS — K51.00 CHRONIC PANCOLONIC ULCERATIVE COLITIS (HCC): Primary | ICD-10-CM

## 2024-07-12 PROCEDURE — 96365 THER/PROPH/DIAG IV INF INIT: CPT

## 2024-07-12 PROCEDURE — 99211 OFF/OP EST MAY X REQ PHY/QHP: CPT

## 2024-07-12 RX ORDER — ACETAMINOPHEN 325 MG/1
650 TABLET ORAL EVERY 6 HOURS PRN
COMMUNITY

## 2024-07-12 ASSESSMENT — PAIN - FUNCTIONAL ASSESSMENT: PAIN_FUNCTIONAL_ASSESSMENT: 0-10

## 2024-07-12 NOTE — PROGRESS NOTES
Pt awake, alert, VSS. Pt with mother, pt pleasant, tolerated IV without difficulty. Pt without complaints.    Rice, and take tylenol every 4 hours as needed for pain.

## 2024-07-12 NOTE — PROGRESS NOTES
Pt awake, alert, infusion complete. VSS. Discharge instructions given. Pt without complaints, IV dc'd, pt discharged to home.

## 2024-07-26 ENCOUNTER — HOSPITAL ENCOUNTER (OUTPATIENT)
Dept: NURSING | Age: 34
Setting detail: INFUSION SERIES
Discharge: HOME | End: 2024-07-26
Payer: COMMERCIAL

## 2024-07-26 VITALS
OXYGEN SATURATION: 99 % | DIASTOLIC BLOOD PRESSURE: 84 MMHG | HEIGHT: 61 IN | RESPIRATION RATE: 16 BRPM | SYSTOLIC BLOOD PRESSURE: 125 MMHG | HEART RATE: 112 BPM | WEIGHT: 148 LBS | BODY MASS INDEX: 27.94 KG/M2 | TEMPERATURE: 96.8 F

## 2024-07-26 DIAGNOSIS — K51.00 CHRONIC PANCOLONIC ULCERATIVE COLITIS (HCC): Primary | ICD-10-CM

## 2024-07-26 PROCEDURE — 96365 THER/PROPH/DIAG IV INF INIT: CPT

## 2024-07-26 ASSESSMENT — PAIN - FUNCTIONAL ASSESSMENT: PAIN_FUNCTIONAL_ASSESSMENT: NONE - DENIES PAIN

## 2024-07-26 NOTE — PROGRESS NOTES
Pt tolerates infusion well. Follow up appointment made for 4 weeks. Discharged to home in stable condition

## 2024-07-26 NOTE — PROGRESS NOTES
Pt arrives and requests I look at lower left leg large ecchymotic area noted with 3 + swelling noted states painful to touch but denies increase in pain with activity. Sates hit the leg on bed frame several days ago but didn't hurt too bad. Due to delayed mental status mother called she was not aware of condition. States patient does not have a primary physician. She wants patient seen by physician. I instructed possibly urgent care or emergency room. Patient and mother discuss and will follow up after Entyvio infusion with one of these options

## 2024-07-29 NOTE — TELEPHONE ENCOUNTER
Jerod called asking for a refill on his prednisone. Spoke to Dr. Medina and he gave a verbal order for the patient to take 10 mg daily and follow up after next infusion.       Prednisone 10 mg pending

## 2024-08-01 RX ORDER — PREDNISONE 10 MG/1
TABLET ORAL
Qty: 30 TABLET | Refills: 1 | OUTPATIENT
Start: 2024-08-01 | End: 2024-08-08

## 2024-08-14 ENCOUNTER — TELEPHONE (OUTPATIENT)
Dept: INTERNAL MEDICINE CLINIC | Age: 34
End: 2024-08-14

## 2024-08-20 ENCOUNTER — TELEPHONE (OUTPATIENT)
Dept: INTERNAL MEDICINE CLINIC | Age: 34
End: 2024-08-20

## 2024-08-20 NOTE — TELEPHONE ENCOUNTER
Spoke to [facility]akedel[facility] and Entyvio will be shipped next day by [facility]  Scheduled on the 23 rd for his infusion.

## 2024-08-23 ENCOUNTER — HOSPITAL ENCOUNTER (OUTPATIENT)
Dept: NURSING | Age: 34
Setting detail: INFUSION SERIES
Discharge: HOME | End: 2024-08-23
Payer: COMMERCIAL

## 2024-08-23 VITALS
SYSTOLIC BLOOD PRESSURE: 141 MMHG | BODY MASS INDEX: 27.94 KG/M2 | OXYGEN SATURATION: 100 % | TEMPERATURE: 96.8 F | HEIGHT: 61 IN | DIASTOLIC BLOOD PRESSURE: 93 MMHG | WEIGHT: 148 LBS | HEART RATE: 104 BPM | RESPIRATION RATE: 16 BRPM

## 2024-08-23 DIAGNOSIS — K51.00 CHRONIC PANCOLONIC ULCERATIVE COLITIS (HCC): Primary | ICD-10-CM

## 2024-08-23 PROCEDURE — 96413 CHEMO IV INFUSION 1 HR: CPT

## 2024-08-23 ASSESSMENT — PAIN - FUNCTIONAL ASSESSMENT: PAIN_FUNCTIONAL_ASSESSMENT: NONE - DENIES PAIN

## 2024-08-23 NOTE — PROGRESS NOTES
Pt tolerates infusion well follow up appointment made for 8 weeks. Discharged to home in stable condition

## 2024-09-26 ENCOUNTER — TELEPHONE (OUTPATIENT)
Dept: INTERNAL MEDICINE CLINIC | Age: 34
End: 2024-09-26

## 2024-09-26 DIAGNOSIS — D50.0 IRON DEFICIENCY ANEMIA DUE TO CHRONIC BLOOD LOSS: Primary | ICD-10-CM

## 2024-10-02 ENCOUNTER — TELEPHONE (OUTPATIENT)
Dept: INTERNAL MEDICINE CLINIC | Age: 34
End: 2024-10-02

## 2024-10-02 DIAGNOSIS — K51.00 CHRONIC PANCOLONIC ULCERATIVE COLITIS (HCC): Primary | ICD-10-CM

## 2024-10-07 NOTE — TELEPHONE ENCOUNTER
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 Will refer to [place] [facility].

## 2024-10-18 ENCOUNTER — HOSPITAL ENCOUNTER (OUTPATIENT)
Dept: NURSING | Age: 34
Setting detail: INFUSION SERIES
Discharge: HOME | End: 2024-10-18
Payer: COMMERCIAL

## 2024-10-18 VITALS
OXYGEN SATURATION: 100 % | HEIGHT: 61 IN | HEART RATE: 104 BPM | BODY MASS INDEX: 27.19 KG/M2 | SYSTOLIC BLOOD PRESSURE: 122 MMHG | TEMPERATURE: 97.2 F | DIASTOLIC BLOOD PRESSURE: 66 MMHG | WEIGHT: 144 LBS | RESPIRATION RATE: 16 BRPM

## 2024-10-18 DIAGNOSIS — K51.00 CHRONIC PANCOLONIC ULCERATIVE COLITIS (HCC): Primary | ICD-10-CM

## 2024-10-18 PROCEDURE — 96365 THER/PROPH/DIAG IV INF INIT: CPT

## 2024-10-18 PROCEDURE — 96413 CHEMO IV INFUSION 1 HR: CPT

## 2024-10-18 RX ORDER — PREDNISONE 1 MG/1
1 TABLET ORAL DAILY
COMMUNITY
End: 2024-10-23 | Stop reason: ALTCHOICE

## 2024-10-18 ASSESSMENT — PAIN - FUNCTIONAL ASSESSMENT: PAIN_FUNCTIONAL_ASSESSMENT: NONE - DENIES PAIN

## 2024-10-22 RX ORDER — PREDNISONE 5 MG/1
10 TABLET ORAL DAILY
Qty: 30 TABLET | Refills: 0 | Status: CANCELLED | OUTPATIENT
Start: 2024-10-22 | End: 2024-12-21

## 2024-10-22 NOTE — TELEPHONE ENCOUNTER
Requested Prescriptions     Pending Prescriptions Disp Refills    predniSONE (DELTASONE) 5 MG tablet 30 tablet 0     Sig: Take 2 tablets by mouth daily

## 2024-10-23 ENCOUNTER — OFFICE VISIT (OUTPATIENT)
Dept: INTERNAL MEDICINE CLINIC | Age: 34
End: 2024-10-23

## 2024-10-23 VITALS
OXYGEN SATURATION: 98 % | SYSTOLIC BLOOD PRESSURE: 122 MMHG | HEIGHT: 64 IN | HEART RATE: 86 BPM | BODY MASS INDEX: 25.27 KG/M2 | DIASTOLIC BLOOD PRESSURE: 87 MMHG | WEIGHT: 148 LBS

## 2024-10-23 DIAGNOSIS — N18.2 STAGE 2 CHRONIC KIDNEY DISEASE: ICD-10-CM

## 2024-10-23 DIAGNOSIS — D50.0 IRON DEFICIENCY ANEMIA DUE TO CHRONIC BLOOD LOSS: ICD-10-CM

## 2024-10-23 DIAGNOSIS — E44.0 MODERATE PROTEIN-CALORIE MALNUTRITION (HCC): ICD-10-CM

## 2024-10-23 DIAGNOSIS — K51.00 CHRONIC PANCOLONIC ULCERATIVE COLITIS (HCC): Primary | ICD-10-CM

## 2024-10-23 PROCEDURE — 99214 OFFICE O/P EST MOD 30 MIN: CPT | Performed by: INTERNAL MEDICINE

## 2024-10-23 RX ORDER — CALCIUM CARBONATE 500 MG/1
1 TABLET, CHEWABLE ORAL DAILY
COMMUNITY

## 2024-10-23 ASSESSMENT — PATIENT HEALTH QUESTIONNAIRE - PHQ9
SUM OF ALL RESPONSES TO PHQ QUESTIONS 1-9: 0
1. LITTLE INTEREST OR PLEASURE IN DOING THINGS: NOT AT ALL
2. FEELING DOWN, DEPRESSED OR HOPELESS: NOT AT ALL
SUM OF ALL RESPONSES TO PHQ QUESTIONS 1-9: 0
SUM OF ALL RESPONSES TO PHQ QUESTIONS 1-9: 0
SUM OF ALL RESPONSES TO PHQ9 QUESTIONS 1 & 2: 0
SUM OF ALL RESPONSES TO PHQ QUESTIONS 1-9: 0

## 2024-10-23 NOTE — PROGRESS NOTES
Gastroenterology Note  Patient:   Lewis Reddy   YOB: 1990   Facility:   [facility]   Date:     10/23/2024  Consultant:   Lewis Medina MD, MD      Subjective:     33 y.o. male seen for Ulcerative Colitis    Patient has been taking vedolizumab  and reports doing better. No blood per rectum, Diarrhea not an issue. Energy OK and working full time. Pyrosis is well controlled with only Tums, no PPI   With regards to weight, he reports stable / unchanged.  Review of available records reveals:  10/23/24 67.1 kg (148 lb)   10/18/24 65.3 kg (144 lb)   08/23/24 67.1 kg (148 lb)   07/26/24 67.1 kg (148 lb)   07/12/24 67.1 kg (148 lb)   07/03/24 67.1 kg (148 lb)   03/06/24 57.6 kg (127 lb)   02/08/24 58.1 kg (128 lb)   12/06/23 58.1 kg (128 lb)   11/08/23 62.6 kg (138 lb)   10/11/23 61.7 kg (136 lb)   09/13/23 62.1 kg (137 lb)   08/09/23 63.5 kg (140 lb)   07/12/23 64.4 kg (142 lb)   06/23/23 64.4 kg (142 lb)   06/14/23 62.6 kg (138 lb)   05/10/23 59 kg (130 lb)   04/12/23 52.2 kg (115 lb)   03/15/23 51.7 kg (114 lb)   02/15/23 54.9 kg (121 lb)   01/18/23 47.2 kg (104 lb)   01/11/23 45.4 kg (100 lb)   01/02/23 34.6 kg (76 lb 4.5 oz)   10/05/20 47.6 kg (105 lb)   12/04/19 51.3 kg (113 lb)   04/15/19 47.8 kg (105 lb 6.4 oz)   03/04/19 48.5 kg (107 lb)   01/25/19 50.8 kg (112 lb)   01/22/19 53.2 kg (117 lb 3.2 oz)   01/16/19 54 kg (119 lb)   10/16/18 56.2 kg (124 lb)   05/24/18 58 kg (127 lb 12.8 oz)   05/17/18 57.5 kg (126 lb 12.8 oz)   04/16/18 59.9 kg (132 lb)   04/09/18 58.5 kg (129 lb)   01/18/18 64.9 kg (143 lb)   06/05/17 78.9 kg (174 lb)   06/09/16 78.9 kg (174 lb)   03/16/16 75.8 kg (167 lb)   01/14/16 72.6 kg (160 lb)   01/14/13 79.3 kg (174 lb 12.8 oz)   06/04/12 72.1 kg (159 lb)   05/21/12 70.8 kg (156 lb)   08/19/10 85.7 kg (189 lb)       Prior to Admission medications    Medication Sig Start Date End Date Taking? Authorizing Provider   calcium

## 2024-11-25 NOTE — TELEPHONE ENCOUNTER
Requested Prescriptions     Pending Prescriptions Disp Refills    predniSONE (DELTASONE) 10 MG tablet 30 tablet 1     Sig: One daily    Pt is asking to continue with the 10mg stating that he is having more diarrhea with the 5 mg.

## 2024-12-04 RX ORDER — PREDNISONE 10 MG/1
TABLET ORAL
Qty: 30 TABLET | Refills: 1 | Status: SHIPPED | OUTPATIENT
Start: 2024-12-04

## 2024-12-13 ENCOUNTER — HOSPITAL ENCOUNTER (OUTPATIENT)
Dept: NURSING | Age: 34
Setting detail: INFUSION SERIES
Discharge: HOME | End: 2024-12-13

## 2025-02-05 ENCOUNTER — OFFICE VISIT (OUTPATIENT)
Dept: INTERNAL MEDICINE CLINIC | Age: 35
End: 2025-02-05

## 2025-02-05 VITALS
WEIGHT: 142 LBS | HEIGHT: 64 IN | OXYGEN SATURATION: 98 % | BODY MASS INDEX: 24.24 KG/M2 | HEART RATE: 100 BPM | DIASTOLIC BLOOD PRESSURE: 83 MMHG | SYSTOLIC BLOOD PRESSURE: 121 MMHG

## 2025-02-05 DIAGNOSIS — K51.00 CHRONIC PANCOLONIC ULCERATIVE COLITIS (HCC): Primary | ICD-10-CM

## 2025-02-05 DIAGNOSIS — K51.00 CHRONIC PANCOLONIC ULCERATIVE COLITIS (HCC): ICD-10-CM

## 2025-02-05 DIAGNOSIS — D50.0 IRON DEFICIENCY ANEMIA DUE TO CHRONIC BLOOD LOSS: ICD-10-CM

## 2025-02-05 DIAGNOSIS — N18.2 STAGE 2 CHRONIC KIDNEY DISEASE: ICD-10-CM

## 2025-02-05 PROCEDURE — 99214 OFFICE O/P EST MOD 30 MIN: CPT | Performed by: INTERNAL MEDICINE

## 2025-02-05 RX ORDER — PREDNISONE 10 MG/1
TABLET ORAL
Qty: 90 TABLET | Refills: 0 | Status: SHIPPED | OUTPATIENT
Start: 2025-02-05

## 2025-02-05 NOTE — PROGRESS NOTES
Gastroenterology Note  Patient:   Lewis Reddy   YOB: 1990   Facility:   [facility]   Date:     2/5/2025  Consultant:   Lewis Medina MD, MD      Subjective:     34 y.o. male seen for Ulcerative Colitis, Dx 01/2019  Patient was doing reasonable well on combination of Entyvio and prednisone 10 mg / D.  HIs last infusion was 10/18/24 but he missed his 8 week infusion because of insurance and $$$ problems.    At present , Prednisone 10 mg daily, Imodium 1-3 per day, 10 diarrheal stools weekly and no blood although he has urgency. With regards to weight, he reports stable / unchanged.  Review of available records reveals:  02/05/25 64.4 kg (142 lb)   10/23/24 67.1 kg (148 lb)   10/18/24 65.3 kg (144 lb)   08/23/24 67.1 kg (148 lb)   07/26/24 67.1 kg (148 lb)   07/12/24 67.1 kg (148 lb)   07/03/24 67.1 kg (148 lb)   03/06/24 57.6 kg (127 lb)   02/08/24 58.1 kg (128 lb)   12/06/23 58.1 kg (128 lb)          Prior to Admission medications    Medication Sig Start Date End Date Taking? Authorizing Provider   predniSONE (DELTASONE) 10 MG tablet One daily 12/4/24  Yes Lewis Medina MD   acetaminophen (TYLENOL) 325 MG tablet Take 2 tablets by mouth every 6 hours as needed for Pain   Yes ProviderSandra MD   loperamide (IMODIUM) 2 MG capsule Take 1 capsule by mouth 4 times daily as needed for Diarrhea Taking for the last 2 weeks   Yes ProviderSandra MD   calcium carbonate (TUMS) 500 MG chewable tablet Take 1 tablet by mouth daily  Patient not taking: Reported on 2/5/2025    ProviderSandra MD      Allergies:   Allergies   Allergen Reactions    Magnesium-Containing Compounds      Increased bowel movements.     Past Medical History:   Diagnosis Date    Cholelithiasis 01/02/2023    See CT    Colitis     Fatty liver 01/02/2023    See CT    JACQUI (iron deficiency anemia) 10/16/2018    Inguinal lymphadenopathy 01/02/2023    Left, see CT

## 2025-03-12 ENCOUNTER — TELEPHONE (OUTPATIENT)
Dept: INTERNAL MEDICINE CLINIC | Age: 35
End: 2025-03-12

## 2025-03-12 NOTE — TELEPHONE ENCOUNTER
Jerod called in stating that he has been having increased diarrhea.     States he has not been able to get the labs done.       Now wants to get back on the Entyvio

## 2025-03-13 ENCOUNTER — TELEPHONE (OUTPATIENT)
Dept: INTERNAL MEDICINE CLINIC | Age: 35
End: 2025-03-13

## 2025-03-13 DIAGNOSIS — K51.00 CHRONIC PANCOLONIC ULCERATIVE COLITIS (HCC): Primary | ICD-10-CM

## 2025-03-13 DIAGNOSIS — D50.0 IRON DEFICIENCY ANEMIA DUE TO CHRONIC BLOOD LOSS: ICD-10-CM

## 2025-03-13 NOTE — TELEPHONE ENCOUNTER
Spoke to Ruth at the infusion center and she is going to get the prior Auth for the Entyvio and reach out to Jerod to get him scheduled,

## 2025-03-13 NOTE — TELEPHONE ENCOUNTER
Ruth short and Jerod needs to have these labs drawn before he can start the Entyvio again.     CBC with Diff, CMP, Iron TIBC and CRP and Quant TB gold.

## 2025-04-06 LAB
ALBUMIN: 3.9 G/DL
ALP BLD-CCNC: 80 U/L
ALT SERPL-CCNC: 14 U/L
ANION GAP SERPL CALCULATED.3IONS-SCNC: ABNORMAL MMOL/L
AST SERPL-CCNC: 11 U/L
BILIRUB SERPL-MCNC: 0.2 MG/DL (ref 0.1–1.4)
BUN BLDV-MCNC: 24 MG/DL
CALCIUM SERPL-MCNC: 9.2 MG/DL
CHLORIDE BLD-SCNC: 108 MMOL/L
CO2: 21 MMOL/L
CREAT SERPL-MCNC: 1.35 MG/DL
GFR, ESTIMATED: 70
GLUCOSE BLD-MCNC: 152 MG/DL
POTASSIUM SERPL-SCNC: 4.3 MMOL/L
SODIUM BLD-SCNC: 152 MMOL/L
TOTAL PROTEIN: 6 G/DL (ref 6.4–8.2)

## 2025-04-07 ENCOUNTER — TELEPHONE (OUTPATIENT)
Dept: NURSING | Age: 35
End: 2025-04-07

## 2025-04-07 ENCOUNTER — TELEPHONE (OUTPATIENT)
Dept: INTERNAL MEDICINE CLINIC | Age: 35
End: 2025-04-07

## 2025-04-07 LAB
BASOPHILS ABSOLUTE: 0 /ΜL
BASOPHILS RELATIVE PERCENT: 0 %
EOSINOPHILS ABSOLUTE: 0 /ΜL
EOSINOPHILS RELATIVE PERCENT: 0 %
HCT VFR BLD CALC: 31 % (ref 41–53)
HEMOGLOBIN: 8.5 G/DL (ref 13.5–17.5)
LYMPHOCYTES ABSOLUTE: 0.4 /ΜL
LYMPHOCYTES RELATIVE PERCENT: 0.4 %
MCH RBC QN AUTO: 19.9 PG
MCHC RBC AUTO-ENTMCNC: 27.4 G/DL
MCV RBC AUTO: 73 FL
MONOCYTES ABSOLUTE: 0.4 /ΜL
MONOCYTES RELATIVE PERCENT: 0.4 %
NEUTROPHILS ABSOLUTE: 7.9 /ΜL
NEUTROPHILS RELATIVE PERCENT: 7.9 %
PDW BLD-RTO: 17 %
PLATELET # BLD: 289 K/ΜL
PMV BLD AUTO: ABNORMAL FL
RBC # BLD: 4.27 10^6/ΜL
WBC # BLD: 8.8 10^3/ML

## 2025-04-07 NOTE — TELEPHONE ENCOUNTER
Dr. Medina is out of town. Patient had his labs drawn and I called Dr. Medina. He suggest orders be put in for an iron infusion. He can get this done when he gets his entyvio infusion.       Labs scanned into media from [facility].     Iron is 16 range   Iron saturation is 5

## 2025-04-07 NOTE — TELEPHONE ENCOUNTER
Jerod called stating he got his labs drawn on Saturday. This RN called [facility] and got the lab results faxed to [facility]. Upon receiving lab results, noted many levels were either low or high, and the iron saturation was noted as \"alert\". Lab results were then faxed to the free care clinic, Ilene SNIDER was notified from the clinic and she states she was getting in contact with Dr. Medina. They will inform this infusion center of next steps.

## 2025-04-09 ENCOUNTER — FOLLOWUP TELEPHONE ENCOUNTER (OUTPATIENT)
Dept: INTERNAL MEDICINE CLINIC | Age: 35
End: 2025-04-09

## 2025-04-09 ENCOUNTER — RESULTS FOLLOW-UP (OUTPATIENT)
Dept: INTERNAL MEDICINE CLINIC | Age: 35
End: 2025-04-09

## 2025-04-09 DIAGNOSIS — K51.00 CHRONIC PANCOLONIC ULCERATIVE COLITIS (HCC): ICD-10-CM

## 2025-04-09 DIAGNOSIS — D50.0 IRON DEFICIENCY ANEMIA DUE TO CHRONIC BLOOD LOSS: ICD-10-CM

## 2025-04-09 LAB
IRON: 16
TOTAL IRON BINDING CAPACITY: 318

## 2025-04-09 NOTE — PROGRESS NOTES
4/9/2025  Lab Results   Component Value Date/Time    WBC 8.8 04/07/2025 12:00 AM    HGB 8.5 04/07/2025 12:00 AM    MCV 73 04/07/2025 12:00 AM     04/07/2025 12:00 AM     Lab Results   Component Value Date/Time    HGB 8.5 04/07/2025 12:00 AM    HGB 9.9 06/25/2024 10:15 AM    HGB 9.9 06/24/2024 03:36 PM    HGB 11.9 12/02/2023 12:00 AM    HGB 11.2 07/07/2023 12:00 AM    HGB 9.6 01/07/2023 06:59 AM    HGB 10.2 01/06/2023 07:50 AM    HGB 10.5 01/05/2023 06:51 AM    HGB 10.5 01/04/2023 07:37 AM    HGB 10.7 01/03/2023 08:08 PM    HGB 5.5 01/03/2023 10:26 AM    HGB 7.4 01/02/2023 11:31 AM    HGB 7.1 10/05/2020 02:05 PM    HGB 12.8 10/16/2018 03:34 PM     Lab Results   Component Value Date/Time    IRON 16 04/06/2025 12:00 AM    TIBC 318 04/06/2025 12:00 AM     Iron/TIBC 5% Saturation      Patient clearly has Iron Deficiency Anemia from Chronic GI Blood Loss and is reluctant to take oral iron.  Will Add iron infusion to next dose of ugo Medina Jr. MD       [facility]  [phone]

## 2025-04-14 ENCOUNTER — TELEPHONE (OUTPATIENT)
Dept: FAMILY MEDICINE CLINIC | Age: 35
End: 2025-04-14

## 2025-04-14 DIAGNOSIS — Z11.1 TUBERCULOSIS SCREENING: Primary | ICD-10-CM

## 2025-04-15 ENCOUNTER — CLINICAL SUPPORT (OUTPATIENT)
Dept: FAMILY MEDICINE CLINIC | Age: 35
End: 2025-04-15
Payer: COMMERCIAL

## 2025-04-15 PROCEDURE — 86580 TB INTRADERMAL TEST: CPT | Performed by: STUDENT IN AN ORGANIZED HEALTH CARE EDUCATION/TRAINING PROGRAM

## 2025-04-17 ENCOUNTER — RESULTS FOLLOW-UP (OUTPATIENT)
Dept: FAMILY MEDICINE CLINIC | Age: 35
End: 2025-04-17

## 2025-04-17 ENCOUNTER — CLINICAL SUPPORT (OUTPATIENT)
Dept: FAMILY MEDICINE CLINIC | Age: 35
End: 2025-04-17

## 2025-04-17 LAB
INDURATION: 0
TB SKIN TEST: NORMAL

## 2025-04-17 RX ORDER — PREDNISONE 10 MG/1
TABLET ORAL
Qty: 90 TABLET | Refills: 0 | Status: SHIPPED | OUTPATIENT
Start: 2025-04-17

## 2025-04-17 NOTE — TELEPHONE ENCOUNTER
Patient will be getting a call from [facility] to schedule Entyvio and venofer infusions

## 2025-04-17 NOTE — TELEPHONE ENCOUNTER
Asking for a refill on his prednisone.   Unable to get the Entyvio at the infusion center due to insurance.     Working with Jaime at Insurance  [phone]    Requested Prescriptions     Pending Prescriptions Disp Refills    predniSONE (DELTASONE) 10 MG tablet 90 tablet 0     Sig: One daily

## 2025-05-13 ENCOUNTER — TELEPHONE (OUTPATIENT)
Dept: INTERNAL MEDICINE CLINIC | Age: 35
End: 2025-05-13

## 2025-05-13 NOTE — TELEPHONE ENCOUNTER
Jerod stopped in the office today and states he is having trouble getting help with FMLA for missing work due to Ulcerative colitis.     I was able to call [facility] at [phone]  And file a claim for missing random dates due to symptoms from 04/21/205 through 04/20/2026    Clain # is [id number]    I expalined to Patient that he will receive a packet in the mail that he will fill out and there will be a form for the Doctor to fill out. This  needs to be turned back in within 15 days from when received.     Patient will need to call [phone] if he is going to miss work.

## 2025-06-18 RX ORDER — PREDNISONE 10 MG/1
TABLET ORAL
Qty: 90 TABLET | Refills: 0 | Status: CANCELLED | OUTPATIENT
Start: 2025-06-18

## 2025-06-18 NOTE — TELEPHONE ENCOUNTER
Next infusion is scheduled for Friday. He is about out prednisone. He is asking for a refill on prednisone..      Requested Prescriptions     Pending Prescriptions Disp Refills    predniSONE (DELTASONE) 10 MG tablet 90 tablet 0     Sig: One daily

## 2025-06-23 RX ORDER — PREDNISONE 10 MG/1
TABLET ORAL
Qty: 90 TABLET | Refills: 0 | Status: SHIPPED | OUTPATIENT
Start: 2025-06-23

## 2025-08-20 ENCOUNTER — OFFICE VISIT (OUTPATIENT)
Dept: INTERNAL MEDICINE CLINIC | Age: 35
End: 2025-08-20

## 2025-08-20 VITALS
OXYGEN SATURATION: 99 % | DIASTOLIC BLOOD PRESSURE: 90 MMHG | HEIGHT: 61 IN | BODY MASS INDEX: 28.89 KG/M2 | HEART RATE: 95 BPM | SYSTOLIC BLOOD PRESSURE: 134 MMHG | WEIGHT: 153 LBS

## 2025-08-20 DIAGNOSIS — K51.00 CHRONIC PANCOLONIC ULCERATIVE COLITIS (HCC): Primary | ICD-10-CM

## 2025-08-20 DIAGNOSIS — N18.31 STAGE 3A CHRONIC KIDNEY DISEASE (HCC): ICD-10-CM

## 2025-08-20 DIAGNOSIS — Z13.1 SCREENING FOR DIABETES MELLITUS (DM): ICD-10-CM

## 2025-08-20 DIAGNOSIS — D50.0 IRON DEFICIENCY ANEMIA DUE TO CHRONIC BLOOD LOSS: ICD-10-CM

## 2025-08-20 DIAGNOSIS — R60.0 PEDAL EDEMA: ICD-10-CM

## 2025-08-20 LAB
BILIRUBIN, URINE: NEGATIVE
BLOOD, URINE: NEGATIVE
CLARITY, UA: CLEAR
COLOR, UA: NORMAL
GLUCOSE URINE: 100
HBA1C MFR BLD: 6.1 %
KETONES, URINE: NEGATIVE
LEUKOCYTE ESTERASE, URINE: NEGATIVE
NITRITE, URINE: NEGATIVE
PH UA: 5.5 (ref 4.5–8)
PROTEIN UA: NEGATIVE
SPECIFIC GRAVITY UA: 1.02 (ref 1–1.03)
UROBILINOGEN, URINE: NORMAL

## 2025-08-20 PROCEDURE — 83036 HEMOGLOBIN GLYCOSYLATED A1C: CPT | Performed by: INTERNAL MEDICINE

## 2025-08-20 RX ORDER — OMEPRAZOLE 20 MG/1
20 CAPSULE, DELAYED RELEASE ORAL
Qty: 30 CAPSULE | Refills: 2 | Status: ON HOLD | OUTPATIENT
Start: 2025-08-20

## 2025-08-22 ENCOUNTER — TELEPHONE (OUTPATIENT)
Dept: INTERNAL MEDICINE CLINIC | Age: 35
End: 2025-08-22

## 2025-08-27 ENCOUNTER — HOSPITAL ENCOUNTER (INPATIENT)
Age: 35
LOS: 8 days | Discharge: NURSING, REHAB OR LONG-TERM CARE | DRG: 300 | End: 2025-09-04
Attending: STUDENT IN AN ORGANIZED HEALTH CARE EDUCATION/TRAINING PROGRAM | Admitting: STUDENT IN AN ORGANIZED HEALTH CARE EDUCATION/TRAINING PROGRAM
Payer: COMMERCIAL

## 2025-08-27 ENCOUNTER — APPOINTMENT (OUTPATIENT)
Dept: CT IMAGING | Age: 35
DRG: 300 | End: 2025-08-27
Payer: COMMERCIAL

## 2025-08-27 ENCOUNTER — APPOINTMENT (OUTPATIENT)
Dept: GENERAL RADIOLOGY | Age: 35
DRG: 300 | End: 2025-08-27
Payer: COMMERCIAL

## 2025-08-27 DIAGNOSIS — T38.0X5A MYOPATHY DUE TO THERAPEUTIC USE OF CORTICOSTEROID: ICD-10-CM

## 2025-08-27 DIAGNOSIS — S22.43XA CLOSED FRACTURE OF MULTIPLE RIBS OF BOTH SIDES, INITIAL ENCOUNTER: ICD-10-CM

## 2025-08-27 DIAGNOSIS — K08.9 DENTAL DISEASE: ICD-10-CM

## 2025-08-27 DIAGNOSIS — G72.0 MYOPATHY DUE TO THERAPEUTIC USE OF CORTICOSTEROID: ICD-10-CM

## 2025-08-27 DIAGNOSIS — R53.1 GENERALIZED WEAKNESS: Primary | ICD-10-CM

## 2025-08-27 DIAGNOSIS — D50.9 MICROCYTIC ANEMIA: ICD-10-CM

## 2025-08-27 DIAGNOSIS — S32.591A OTHER CLOSED FRACTURE OF RIGHT PUBIS, INITIAL ENCOUNTER (HCC): ICD-10-CM

## 2025-08-27 PROBLEM — M62.81 PROXIMAL MUSCLE WEAKNESS: Status: ACTIVE | Noted: 2025-08-27

## 2025-08-27 LAB
ALBUMIN SERPL-MCNC: 4.1 G/DL (ref 3.4–5)
ALBUMIN/GLOB SERPL: 1.7 {RATIO} (ref 1.1–2.2)
ALP SERPL-CCNC: 115 U/L (ref 40–129)
ALT SERPL-CCNC: 27 U/L (ref 10–40)
AMORPH SED URNS QL MICRO: NORMAL /HPF
ANION GAP SERPL CALCULATED.3IONS-SCNC: 12 MMOL/L (ref 3–16)
ANISOCYTOSIS BLD QL SMEAR: ABNORMAL
APAP SERPL-MCNC: <5 UG/ML (ref 10–30)
AST SERPL-CCNC: 22 U/L (ref 15–37)
BASE EXCESS BLDV CALC-SCNC: -2.1 MMOL/L (ref -3–3)
BASOPHILS # BLD: 0 K/UL (ref 0–0.2)
BASOPHILS NFR BLD: 0.2 %
BILIRUB SERPL-MCNC: 0.3 MG/DL (ref 0–1)
BILIRUB UR QL STRIP.AUTO: NEGATIVE
BUN SERPL-MCNC: 21 MG/DL (ref 7–20)
CALCIUM SERPL-MCNC: 9.5 MG/DL (ref 8.3–10.6)
CHLORIDE SERPL-SCNC: 109 MMOL/L (ref 99–110)
CK SERPL-CCNC: 30 U/L (ref 39–308)
CLARITY UR: CLEAR
CO2 BLDV-SCNC: 23 MMOL/L
CO2 SERPL-SCNC: 21 MMOL/L (ref 21–32)
COHGB MFR BLDV: 2.7 % (ref 0–1.5)
COLOR UR: YELLOW
CREAT SERPL-MCNC: 1.2 MG/DL (ref 0.9–1.3)
DEPRECATED RDW RBC AUTO: 20.5 % (ref 12.4–15.4)
EKG ATRIAL RATE: 69 BPM
EKG DIAGNOSIS: NORMAL
EKG P AXIS: 8 DEGREES
EKG P-R INTERVAL: 108 MS
EKG Q-T INTERVAL: 358 MS
EKG QRS DURATION: 74 MS
EKG QTC CALCULATION (BAZETT): 383 MS
EKG R AXIS: 12 DEGREES
EKG T AXIS: 2 DEGREES
EKG VENTRICULAR RATE: 69 BPM
EOSINOPHIL # BLD: 0 K/UL (ref 0–0.6)
EOSINOPHIL NFR BLD: 0.4 %
EPI CELLS #/AREA URNS HPF: NORMAL /HPF (ref 0–5)
GFR SERPLBLD CREATININE-BSD FMLA CKD-EPI: 80 ML/MIN/{1.73_M2}
GLUCOSE SERPL-MCNC: 111 MG/DL (ref 70–99)
GLUCOSE UR STRIP.AUTO-MCNC: NEGATIVE MG/DL
HCO3 BLDV-SCNC: 22.1 MMOL/L (ref 23–29)
HCT VFR BLD AUTO: 30.5 % (ref 40.5–52.5)
HGB BLD-MCNC: 9.3 G/DL (ref 13.5–17.5)
HGB UR QL STRIP.AUTO: NEGATIVE
INR PPP: 1.19 (ref 0.86–1.14)
KETONES UR STRIP.AUTO-MCNC: NEGATIVE MG/DL
LEUKOCYTE ESTERASE UR QL STRIP.AUTO: NEGATIVE
LYMPHOCYTES # BLD: 1.3 K/UL (ref 1–5.1)
LYMPHOCYTES NFR BLD: 12.9 %
MCH RBC QN AUTO: 21.2 PG (ref 26–34)
MCHC RBC AUTO-ENTMCNC: 30.5 G/DL (ref 31–36)
MCV RBC AUTO: 69.3 FL (ref 80–100)
METHGB MFR BLDV: 0.3 %
MICROCYTES BLD QL SMEAR: ABNORMAL
MONOCYTES # BLD: 0.6 K/UL (ref 0–1.3)
MONOCYTES NFR BLD: 5.8 %
NEUTROPHILS # BLD: 8 K/UL (ref 1.7–7.7)
NEUTROPHILS NFR BLD: 80.7 %
NITRITE UR QL STRIP.AUTO: NEGATIVE
NT-PROBNP SERPL-MCNC: 91 PG/ML (ref 0–124)
O2 THERAPY: ABNORMAL
OVALOCYTES BLD QL SMEAR: ABNORMAL
PATH INTERP BLD-IMP: NORMAL
PATH INTERP BLD-IMP: YES
PCO2 BLDV: 35.5 MMHG (ref 40–50)
PH BLDV: 7.41 [PH] (ref 7.35–7.45)
PH UR STRIP.AUTO: 6 [PH] (ref 5–8)
PLATELET # BLD AUTO: 225 K/UL (ref 135–450)
PMV BLD AUTO: 8.7 FL (ref 5–10.5)
PO2 BLDV: 40.2 MMHG (ref 25–40)
POIKILOCYTOSIS BLD QL SMEAR: ABNORMAL
POTASSIUM SERPL-SCNC: 3.6 MMOL/L (ref 3.5–5.1)
PROT SERPL-MCNC: 6.5 G/DL (ref 6.4–8.2)
PROT UR STRIP.AUTO-MCNC: ABNORMAL MG/DL
PROTHROMBIN TIME: 15.4 SEC (ref 12.1–14.9)
RBC # BLD AUTO: 4.4 M/UL (ref 4.2–5.9)
RBC #/AREA URNS HPF: NORMAL /HPF (ref 0–4)
SAO2 % BLDV: 76 %
SLIDE REVIEW: ABNORMAL
SODIUM SERPL-SCNC: 142 MMOL/L (ref 136–145)
SP GR UR STRIP.AUTO: 1.02 (ref 1–1.03)
TROPONIN, HIGH SENSITIVITY: 21 NG/L (ref 0–22)
TSH SERPL DL<=0.005 MIU/L-ACNC: 2.43 UIU/ML (ref 0.27–4.2)
UA COMPLETE W REFLEX CULTURE PNL UR: ABNORMAL
UA DIPSTICK W REFLEX MICRO PNL UR: YES
URN SPEC COLLECT METH UR: ABNORMAL
UROBILINOGEN UR STRIP-ACNC: 0.2 E.U./DL
WBC # BLD AUTO: 9.9 K/UL (ref 4–11)
WBC #/AREA URNS HPF: NORMAL /HPF (ref 0–5)

## 2025-08-27 PROCEDURE — 72128 CT CHEST SPINE W/O DYE: CPT

## 2025-08-27 PROCEDURE — 96374 THER/PROPH/DIAG INJ IV PUSH: CPT

## 2025-08-27 PROCEDURE — 2500000003 HC RX 250 WO HCPCS: Performed by: STUDENT IN AN ORGANIZED HEALTH CARE EDUCATION/TRAINING PROGRAM

## 2025-08-27 PROCEDURE — 84484 ASSAY OF TROPONIN QUANT: CPT

## 2025-08-27 PROCEDURE — 93010 ELECTROCARDIOGRAM REPORT: CPT | Performed by: INTERNAL MEDICINE

## 2025-08-27 PROCEDURE — 85025 COMPLETE CBC W/AUTO DIFF WBC: CPT

## 2025-08-27 PROCEDURE — 1200000000 HC SEMI PRIVATE

## 2025-08-27 PROCEDURE — 93005 ELECTROCARDIOGRAM TRACING: CPT

## 2025-08-27 PROCEDURE — 6370000000 HC RX 637 (ALT 250 FOR IP)

## 2025-08-27 PROCEDURE — 85610 PROTHROMBIN TIME: CPT

## 2025-08-27 PROCEDURE — 70450 CT HEAD/BRAIN W/O DYE: CPT

## 2025-08-27 PROCEDURE — 71045 X-RAY EXAM CHEST 1 VIEW: CPT

## 2025-08-27 PROCEDURE — 82550 ASSAY OF CK (CPK): CPT

## 2025-08-27 PROCEDURE — 6360000002 HC RX W HCPCS: Performed by: STUDENT IN AN ORGANIZED HEALTH CARE EDUCATION/TRAINING PROGRAM

## 2025-08-27 PROCEDURE — 81001 URINALYSIS AUTO W/SCOPE: CPT

## 2025-08-27 PROCEDURE — 70486 CT MAXILLOFACIAL W/O DYE: CPT

## 2025-08-27 PROCEDURE — 99285 EMERGENCY DEPT VISIT HI MDM: CPT

## 2025-08-27 PROCEDURE — 80143 DRUG ASSAY ACETAMINOPHEN: CPT

## 2025-08-27 PROCEDURE — 72125 CT NECK SPINE W/O DYE: CPT

## 2025-08-27 PROCEDURE — 74176 CT ABD & PELVIS W/O CONTRAST: CPT

## 2025-08-27 PROCEDURE — 80053 COMPREHEN METABOLIC PANEL: CPT

## 2025-08-27 PROCEDURE — 83880 ASSAY OF NATRIURETIC PEPTIDE: CPT

## 2025-08-27 PROCEDURE — 82803 BLOOD GASES ANY COMBINATION: CPT

## 2025-08-27 PROCEDURE — 84443 ASSAY THYROID STIM HORMONE: CPT

## 2025-08-27 PROCEDURE — 6360000002 HC RX W HCPCS

## 2025-08-27 PROCEDURE — 73521 X-RAY EXAM HIPS BI 2 VIEWS: CPT

## 2025-08-27 RX ORDER — KETOROLAC TROMETHAMINE 30 MG/ML
15 INJECTION, SOLUTION INTRAMUSCULAR; INTRAVENOUS ONCE
Status: COMPLETED | OUTPATIENT
Start: 2025-08-27 | End: 2025-08-27

## 2025-08-27 RX ORDER — POTASSIUM CHLORIDE 1500 MG/1
40 TABLET, EXTENDED RELEASE ORAL PRN
Status: DISCONTINUED | OUTPATIENT
Start: 2025-08-27 | End: 2025-09-04 | Stop reason: HOSPADM

## 2025-08-27 RX ORDER — ENOXAPARIN SODIUM 100 MG/ML
40 INJECTION SUBCUTANEOUS DAILY
Status: DISCONTINUED | OUTPATIENT
Start: 2025-08-27 | End: 2025-08-29

## 2025-08-27 RX ORDER — MAGNESIUM SULFATE IN WATER 40 MG/ML
2000 INJECTION, SOLUTION INTRAVENOUS PRN
Status: DISCONTINUED | OUTPATIENT
Start: 2025-08-27 | End: 2025-09-04 | Stop reason: HOSPADM

## 2025-08-27 RX ORDER — ACETAMINOPHEN 650 MG/1
650 SUPPOSITORY RECTAL EVERY 6 HOURS PRN
Status: DISCONTINUED | OUTPATIENT
Start: 2025-08-27 | End: 2025-09-04 | Stop reason: HOSPADM

## 2025-08-27 RX ORDER — PANTOPRAZOLE SODIUM 40 MG/1
40 TABLET, DELAYED RELEASE ORAL
Status: DISCONTINUED | OUTPATIENT
Start: 2025-08-28 | End: 2025-09-04 | Stop reason: HOSPADM

## 2025-08-27 RX ORDER — SODIUM CHLORIDE 9 MG/ML
INJECTION, SOLUTION INTRAVENOUS PRN
Status: DISCONTINUED | OUTPATIENT
Start: 2025-08-27 | End: 2025-09-04 | Stop reason: HOSPADM

## 2025-08-27 RX ORDER — LOPERAMIDE HYDROCHLORIDE 2 MG/1
2 CAPSULE ORAL 4 TIMES DAILY PRN
Status: DISCONTINUED | OUTPATIENT
Start: 2025-08-27 | End: 2025-09-04 | Stop reason: HOSPADM

## 2025-08-27 RX ORDER — ACETAMINOPHEN 325 MG/1
650 TABLET ORAL EVERY 6 HOURS PRN
Status: DISCONTINUED | OUTPATIENT
Start: 2025-08-27 | End: 2025-09-04 | Stop reason: HOSPADM

## 2025-08-27 RX ORDER — ONDANSETRON 4 MG/1
4 TABLET, ORALLY DISINTEGRATING ORAL EVERY 8 HOURS PRN
Status: DISCONTINUED | OUTPATIENT
Start: 2025-08-27 | End: 2025-09-04 | Stop reason: HOSPADM

## 2025-08-27 RX ORDER — POLYETHYLENE GLYCOL 3350 17 G/17G
17 POWDER, FOR SOLUTION ORAL DAILY PRN
Status: DISCONTINUED | OUTPATIENT
Start: 2025-08-27 | End: 2025-09-04 | Stop reason: HOSPADM

## 2025-08-27 RX ORDER — SODIUM CHLORIDE 0.9 % (FLUSH) 0.9 %
5-40 SYRINGE (ML) INJECTION PRN
Status: DISCONTINUED | OUTPATIENT
Start: 2025-08-27 | End: 2025-09-04 | Stop reason: HOSPADM

## 2025-08-27 RX ORDER — ONDANSETRON 2 MG/ML
4 INJECTION INTRAMUSCULAR; INTRAVENOUS EVERY 6 HOURS PRN
Status: DISCONTINUED | OUTPATIENT
Start: 2025-08-27 | End: 2025-09-04 | Stop reason: HOSPADM

## 2025-08-27 RX ORDER — SODIUM CHLORIDE 0.9 % (FLUSH) 0.9 %
5-40 SYRINGE (ML) INJECTION EVERY 12 HOURS SCHEDULED
Status: DISCONTINUED | OUTPATIENT
Start: 2025-08-27 | End: 2025-09-04 | Stop reason: HOSPADM

## 2025-08-27 RX ORDER — POTASSIUM CHLORIDE 7.45 MG/ML
10 INJECTION INTRAVENOUS PRN
Status: DISCONTINUED | OUTPATIENT
Start: 2025-08-27 | End: 2025-09-04 | Stop reason: HOSPADM

## 2025-08-27 RX ORDER — IOPAMIDOL 755 MG/ML
75 INJECTION, SOLUTION INTRAVASCULAR
Status: DISCONTINUED | OUTPATIENT
Start: 2025-08-27 | End: 2025-09-04 | Stop reason: HOSPADM

## 2025-08-27 RX ADMIN — AMOXICILLIN AND CLAVULANATE POTASSIUM 1 TABLET: 875; 125 TABLET, FILM COATED ORAL at 18:24

## 2025-08-27 RX ADMIN — Medication 10 ML: at 21:33

## 2025-08-27 RX ADMIN — KETOROLAC TROMETHAMINE 15 MG: 30 INJECTION, SOLUTION INTRAMUSCULAR at 18:19

## 2025-08-27 RX ADMIN — ENOXAPARIN SODIUM 40 MG: 100 INJECTION SUBCUTANEOUS at 21:33

## 2025-08-27 ASSESSMENT — PAIN DESCRIPTION - ORIENTATION: ORIENTATION: RIGHT

## 2025-08-27 ASSESSMENT — PAIN - FUNCTIONAL ASSESSMENT: PAIN_FUNCTIONAL_ASSESSMENT: 0-10

## 2025-08-27 ASSESSMENT — PAIN SCALES - GENERAL: PAINLEVEL_OUTOF10: 8

## 2025-08-27 ASSESSMENT — PAIN DESCRIPTION - LOCATION: LOCATION: FACE;LEG

## 2025-08-28 ENCOUNTER — APPOINTMENT (OUTPATIENT)
Dept: VASCULAR LAB | Age: 35
DRG: 300 | End: 2025-08-28
Payer: COMMERCIAL

## 2025-08-28 LAB
25(OH)D3 SERPL-MCNC: 15.1 NG/ML
ANION GAP SERPL CALCULATED.3IONS-SCNC: 12 MMOL/L (ref 3–16)
APTT BLD: 39.8 SEC (ref 22.8–35.8)
BASOPHILS # BLD: 0 K/UL (ref 0–0.2)
BASOPHILS NFR BLD: 0.1 %
BUN SERPL-MCNC: 16 MG/DL (ref 7–20)
CA-I BLD-SCNC: 1.14 MMOL/L (ref 1.12–1.32)
CALCIUM SERPL-MCNC: 9 MG/DL (ref 8.3–10.6)
CHLORIDE SERPL-SCNC: 106 MMOL/L (ref 99–110)
CO2 SERPL-SCNC: 21 MMOL/L (ref 21–32)
CREAT SERPL-MCNC: 1.1 MG/DL (ref 0.9–1.3)
CRP SERPL-MCNC: 100 MG/L (ref 0–5.1)
DEPRECATED RDW RBC AUTO: 19.9 % (ref 12.4–15.4)
EOSINOPHIL # BLD: 0 K/UL (ref 0–0.6)
EOSINOPHIL NFR BLD: 0.2 %
ERYTHROCYTE [SEDIMENTATION RATE] IN BLOOD BY WESTERGREN METHOD: 42 MM/HR (ref 0–15)
FIBRINOGEN PPP-MCNC: 510 MG/DL (ref 220–516)
GFR SERPLBLD CREATININE-BSD FMLA CKD-EPI: 89 ML/MIN/{1.73_M2}
GLUCOSE SERPL-MCNC: 95 MG/DL (ref 70–99)
HCT VFR BLD AUTO: 30.6 % (ref 40.5–52.5)
HGB BLD-MCNC: 9.4 G/DL (ref 13.5–17.5)
LYMPHOCYTES # BLD: 0.7 K/UL (ref 1–5.1)
LYMPHOCYTES NFR BLD: 7.9 %
MAGNESIUM SERPL-MCNC: 1.79 MG/DL (ref 1.8–2.4)
MCH RBC QN AUTO: 21.2 PG (ref 26–34)
MCHC RBC AUTO-ENTMCNC: 30.7 G/DL (ref 31–36)
MCV RBC AUTO: 68.9 FL (ref 80–100)
MONOCYTES # BLD: 0.5 K/UL (ref 0–1.3)
MONOCYTES NFR BLD: 5.4 %
NEUTROPHILS # BLD: 7.5 K/UL (ref 1.7–7.7)
NEUTROPHILS NFR BLD: 86.4 %
PATH INTERP BLD-IMP: NO
PH BLDV: 7.45 [PH] (ref 7.35–7.45)
PLATELET # BLD AUTO: 185 K/UL (ref 135–450)
PMV BLD AUTO: 8.5 FL (ref 5–10.5)
POTASSIUM SERPL-SCNC: 3.4 MMOL/L (ref 3.5–5.1)
RBC # BLD AUTO: 4.44 M/UL (ref 4.2–5.9)
SODIUM SERPL-SCNC: 139 MMOL/L (ref 136–145)
WBC # BLD AUTO: 8.7 K/UL (ref 4–11)

## 2025-08-28 PROCEDURE — 85730 THROMBOPLASTIN TIME PARTIAL: CPT

## 2025-08-28 PROCEDURE — 83993 ASSAY FOR CALPROTECTIN FECAL: CPT

## 2025-08-28 PROCEDURE — 85652 RBC SED RATE AUTOMATED: CPT

## 2025-08-28 PROCEDURE — 87449 NOS EACH ORGANISM AG IA: CPT

## 2025-08-28 PROCEDURE — 2500000003 HC RX 250 WO HCPCS: Performed by: STUDENT IN AN ORGANIZED HEALTH CARE EDUCATION/TRAINING PROGRAM

## 2025-08-28 PROCEDURE — 82653 EL-1 FECAL QUANTITATIVE: CPT

## 2025-08-28 PROCEDURE — 6370000000 HC RX 637 (ALT 250 FOR IP): Performed by: STUDENT IN AN ORGANIZED HEALTH CARE EDUCATION/TRAINING PROGRAM

## 2025-08-28 PROCEDURE — 85384 FIBRINOGEN ACTIVITY: CPT

## 2025-08-28 PROCEDURE — 97165 OT EVAL LOW COMPLEX 30 MIN: CPT

## 2025-08-28 PROCEDURE — 97530 THERAPEUTIC ACTIVITIES: CPT

## 2025-08-28 PROCEDURE — 86140 C-REACTIVE PROTEIN: CPT

## 2025-08-28 PROCEDURE — 87324 CLOSTRIDIUM AG IA: CPT

## 2025-08-28 PROCEDURE — 82330 ASSAY OF CALCIUM: CPT

## 2025-08-28 PROCEDURE — 51798 US URINE CAPACITY MEASURE: CPT

## 2025-08-28 PROCEDURE — 82306 VITAMIN D 25 HYDROXY: CPT

## 2025-08-28 PROCEDURE — 1200000000 HC SEMI PRIVATE

## 2025-08-28 PROCEDURE — 87506 IADNA-DNA/RNA PROBE TQ 6-11: CPT

## 2025-08-28 PROCEDURE — 85025 COMPLETE CBC W/AUTO DIFF WBC: CPT

## 2025-08-28 PROCEDURE — 97116 GAIT TRAINING THERAPY: CPT

## 2025-08-28 PROCEDURE — 97162 PT EVAL MOD COMPLEX 30 MIN: CPT

## 2025-08-28 PROCEDURE — 6360000002 HC RX W HCPCS: Performed by: STUDENT IN AN ORGANIZED HEALTH CARE EDUCATION/TRAINING PROGRAM

## 2025-08-28 PROCEDURE — 36415 COLL VENOUS BLD VENIPUNCTURE: CPT

## 2025-08-28 PROCEDURE — 80048 BASIC METABOLIC PNL TOTAL CA: CPT

## 2025-08-28 PROCEDURE — 83735 ASSAY OF MAGNESIUM: CPT

## 2025-08-28 RX ADMIN — Medication 10 ML: at 07:53

## 2025-08-28 RX ADMIN — ENOXAPARIN SODIUM 40 MG: 100 INJECTION SUBCUTANEOUS at 07:53

## 2025-08-28 RX ADMIN — POTASSIUM CHLORIDE 40 MEQ: 1500 TABLET, EXTENDED RELEASE ORAL at 07:53

## 2025-08-28 RX ADMIN — PANTOPRAZOLE SODIUM 40 MG: 40 TABLET, DELAYED RELEASE ORAL at 05:59

## 2025-08-28 ASSESSMENT — PAIN DESCRIPTION - DESCRIPTORS: DESCRIPTORS: ACHING;DISCOMFORT;SORE

## 2025-08-28 ASSESSMENT — PAIN SCALES - GENERAL
PAINLEVEL_OUTOF10: 5
PAINLEVEL_OUTOF10: 0

## 2025-08-28 ASSESSMENT — PAIN DESCRIPTION - ORIENTATION: ORIENTATION: RIGHT

## 2025-08-28 ASSESSMENT — PAIN DESCRIPTION - LOCATION: LOCATION: HIP;KNEE

## 2025-08-29 ENCOUNTER — APPOINTMENT (OUTPATIENT)
Dept: VASCULAR LAB | Age: 35
DRG: 300 | End: 2025-08-29
Payer: COMMERCIAL

## 2025-08-29 PROBLEM — R53.1 GENERALIZED WEAKNESS: Status: ACTIVE | Noted: 2025-08-29

## 2025-08-29 LAB
ANION GAP SERPL CALCULATED.3IONS-SCNC: 10 MMOL/L (ref 3–16)
ANTI-XA UNFRAC HEPARIN: 0.31 IU/ML (ref 0.3–0.7)
ANTI-XA UNFRAC HEPARIN: 0.58 IU/ML (ref 0.3–0.7)
APTT BLD: 40.9 SEC (ref 22.8–35.8)
BASOPHILS # BLD: 0 K/UL (ref 0–0.2)
BASOPHILS NFR BLD: 0.2 %
BUN SERPL-MCNC: 13 MG/DL (ref 7–20)
C DIFF TOX A+B STL QL IA: NORMAL
C DIFF TOX GENS STL QL NAA+PROBE: ABNORMAL
CALCIUM SERPL-MCNC: 9 MG/DL (ref 8.3–10.6)
CHLORIDE SERPL-SCNC: 108 MMOL/L (ref 99–110)
CO2 SERPL-SCNC: 20 MMOL/L (ref 21–32)
CREAT SERPL-MCNC: 1.3 MG/DL (ref 0.9–1.3)
DEPRECATED RDW RBC AUTO: 20.3 % (ref 12.4–15.4)
DEPRECATED RDW RBC AUTO: 20.6 % (ref 12.4–15.4)
ECHO BSA: 1.73 M2
EOSINOPHIL # BLD: 0.1 K/UL (ref 0–0.6)
EOSINOPHIL NFR BLD: 0.8 %
GFR SERPLBLD CREATININE-BSD FMLA CKD-EPI: 73 ML/MIN/{1.73_M2}
GI PATHOGENS PNL STL NAA+PROBE: NORMAL
GLUCOSE SERPL-MCNC: 152 MG/DL (ref 70–99)
HCT VFR BLD AUTO: 27.4 % (ref 40.5–52.5)
HCT VFR BLD AUTO: 32.4 % (ref 40.5–52.5)
HGB BLD-MCNC: 8.3 G/DL (ref 13.5–17.5)
HGB BLD-MCNC: 9.6 G/DL (ref 13.5–17.5)
IMM RETICS NFR: 0.37 % (ref 0.21–0.37)
INR PPP: 1.31 (ref 0.86–1.14)
LDH SERPL L TO P-CCNC: 269 U/L (ref 100–190)
LYMPHOCYTES # BLD: 0.9 K/UL (ref 1–5.1)
LYMPHOCYTES NFR BLD: 7.8 %
MCH RBC QN AUTO: 20.9 PG (ref 26–34)
MCH RBC QN AUTO: 21.2 PG (ref 26–34)
MCHC RBC AUTO-ENTMCNC: 29.8 G/DL (ref 31–36)
MCHC RBC AUTO-ENTMCNC: 30.2 G/DL (ref 31–36)
MCV RBC AUTO: 70.1 FL (ref 80–100)
MCV RBC AUTO: 70.1 FL (ref 80–100)
MONOCYTES # BLD: 0.7 K/UL (ref 0–1.3)
MONOCYTES NFR BLD: 6.1 %
NEUTROPHILS # BLD: 9.4 K/UL (ref 1.7–7.7)
NEUTROPHILS NFR BLD: 85.1 %
ORGANISM: ABNORMAL
PATH INTERP BLD-IMP: NORMAL
PLATELET # BLD AUTO: 164 K/UL (ref 135–450)
PLATELET # BLD AUTO: 264 K/UL (ref 135–450)
PMV BLD AUTO: 8.4 FL (ref 5–10.5)
PMV BLD AUTO: 8.5 FL (ref 5–10.5)
POTASSIUM SERPL-SCNC: 4.1 MMOL/L (ref 3.5–5.1)
PROTHROMBIN TIME: 16.5 SEC (ref 12.1–14.9)
RBC # BLD AUTO: 3.9 M/UL (ref 4.2–5.9)
RBC # BLD AUTO: 4.62 M/UL (ref 4.2–5.9)
RETICS # AUTO: 0.06 M/UL
RETICS/RBC NFR AUTO: 1.33 % (ref 0.5–2.18)
SODIUM SERPL-SCNC: 138 MMOL/L (ref 136–145)
WBC # BLD AUTO: 11 K/UL (ref 4–11)
WBC # BLD AUTO: 16.3 K/UL (ref 4–11)

## 2025-08-29 PROCEDURE — 6360000002 HC RX W HCPCS: Performed by: STUDENT IN AN ORGANIZED HEALTH CARE EDUCATION/TRAINING PROGRAM

## 2025-08-29 PROCEDURE — 1200000000 HC SEMI PRIVATE

## 2025-08-29 PROCEDURE — 85520 HEPARIN ASSAY: CPT

## 2025-08-29 PROCEDURE — 87329 GIARDIA AG IA: CPT

## 2025-08-29 PROCEDURE — 87493 C DIFF AMPLIFIED PROBE: CPT

## 2025-08-29 PROCEDURE — 87328 CRYPTOSPORIDIUM AG IA: CPT

## 2025-08-29 PROCEDURE — 80048 BASIC METABOLIC PNL TOTAL CA: CPT

## 2025-08-29 PROCEDURE — 83615 LACTATE (LD) (LDH) ENZYME: CPT

## 2025-08-29 PROCEDURE — 85027 COMPLETE CBC AUTOMATED: CPT

## 2025-08-29 PROCEDURE — 85730 THROMBOPLASTIN TIME PARTIAL: CPT

## 2025-08-29 PROCEDURE — 93970 EXTREMITY STUDY: CPT

## 2025-08-29 PROCEDURE — 93970 EXTREMITY STUDY: CPT | Performed by: SURGERY

## 2025-08-29 PROCEDURE — 85025 COMPLETE CBC W/AUTO DIFF WBC: CPT

## 2025-08-29 PROCEDURE — 36415 COLL VENOUS BLD VENIPUNCTURE: CPT

## 2025-08-29 PROCEDURE — 99222 1ST HOSP IP/OBS MODERATE 55: CPT | Performed by: SURGERY

## 2025-08-29 PROCEDURE — 85610 PROTHROMBIN TIME: CPT

## 2025-08-29 PROCEDURE — 2500000003 HC RX 250 WO HCPCS: Performed by: STUDENT IN AN ORGANIZED HEALTH CARE EDUCATION/TRAINING PROGRAM

## 2025-08-29 PROCEDURE — 6370000000 HC RX 637 (ALT 250 FOR IP): Performed by: NURSE PRACTITIONER

## 2025-08-29 PROCEDURE — 85045 AUTOMATED RETICULOCYTE COUNT: CPT

## 2025-08-29 PROCEDURE — 51798 US URINE CAPACITY MEASURE: CPT

## 2025-08-29 PROCEDURE — 6370000000 HC RX 637 (ALT 250 FOR IP): Performed by: STUDENT IN AN ORGANIZED HEALTH CARE EDUCATION/TRAINING PROGRAM

## 2025-08-29 PROCEDURE — 6360000002 HC RX W HCPCS: Performed by: NURSE PRACTITIONER

## 2025-08-29 PROCEDURE — 87336 ENTAMOEB HIST DISPR AG IA: CPT

## 2025-08-29 RX ORDER — PREDNISONE 20 MG/1
40 TABLET ORAL DAILY
Status: DISCONTINUED | OUTPATIENT
Start: 2025-08-29 | End: 2025-08-30

## 2025-08-29 RX ORDER — HEPARIN SODIUM 1000 [USP'U]/ML
80 INJECTION, SOLUTION INTRAVENOUS; SUBCUTANEOUS ONCE
Status: DISCONTINUED | OUTPATIENT
Start: 2025-08-29 | End: 2025-08-29

## 2025-08-29 RX ORDER — HEPARIN SODIUM 1000 [USP'U]/ML
80 INJECTION, SOLUTION INTRAVENOUS; SUBCUTANEOUS PRN
Status: DISCONTINUED | OUTPATIENT
Start: 2025-08-29 | End: 2025-08-30

## 2025-08-29 RX ORDER — HEPARIN SODIUM 1000 [USP'U]/ML
40 INJECTION, SOLUTION INTRAVENOUS; SUBCUTANEOUS PRN
Status: DISCONTINUED | OUTPATIENT
Start: 2025-08-29 | End: 2025-08-30

## 2025-08-29 RX ORDER — HEPARIN SODIUM 10000 [USP'U]/100ML
5-30 INJECTION, SOLUTION INTRAVENOUS CONTINUOUS
Status: DISCONTINUED | OUTPATIENT
Start: 2025-08-29 | End: 2025-08-30

## 2025-08-29 RX ADMIN — ACETAMINOPHEN 650 MG: 325 TABLET ORAL at 04:01

## 2025-08-29 RX ADMIN — ENOXAPARIN SODIUM 40 MG: 100 INJECTION SUBCUTANEOUS at 09:41

## 2025-08-29 RX ADMIN — Medication 10 ML: at 09:42

## 2025-08-29 RX ADMIN — HEPARIN SODIUM AND DEXTROSE 18 UNITS/KG/HR: 10000; 5 INJECTION INTRAVENOUS at 14:01

## 2025-08-29 RX ADMIN — Medication 10 ML: at 23:45

## 2025-08-29 RX ADMIN — PREDNISONE 40 MG: 20 TABLET ORAL at 17:48

## 2025-08-29 ASSESSMENT — PAIN - FUNCTIONAL ASSESSMENT: PAIN_FUNCTIONAL_ASSESSMENT: 0-10

## 2025-08-29 ASSESSMENT — PAIN SCALES - GENERAL
PAINLEVEL_OUTOF10: 7
PAINLEVEL_OUTOF10: 0

## 2025-08-29 ASSESSMENT — PAIN DESCRIPTION - DESCRIPTORS: DESCRIPTORS: ACHING

## 2025-08-29 ASSESSMENT — PAIN DESCRIPTION - LOCATION: LOCATION: OTHER (COMMENT)

## 2025-08-30 ENCOUNTER — APPOINTMENT (OUTPATIENT)
Dept: GENERAL RADIOLOGY | Age: 35
DRG: 300 | End: 2025-08-30
Payer: COMMERCIAL

## 2025-08-30 LAB
ANION GAP SERPL CALCULATED.3IONS-SCNC: 13 MMOL/L (ref 3–16)
ANTI-XA UNFRAC HEPARIN: 0.15 IU/ML (ref 0.3–0.7)
ANTI-XA UNFRAC HEPARIN: <0.1 IU/ML (ref 0.3–0.7)
BASOPHILS # BLD: 0 K/UL (ref 0–0.2)
BASOPHILS NFR BLD: 0.1 %
BUN SERPL-MCNC: 13 MG/DL (ref 7–20)
CALCIUM SERPL-MCNC: 9.5 MG/DL (ref 8.3–10.6)
CHLORIDE SERPL-SCNC: 103 MMOL/L (ref 99–110)
CO2 SERPL-SCNC: 23 MMOL/L (ref 21–32)
CREAT SERPL-MCNC: 1 MG/DL (ref 0.9–1.3)
CRYPTOSP AG STL QL IA: NORMAL
DEPRECATED RDW RBC AUTO: 20.7 % (ref 12.4–15.4)
E HISTOLYT AG STL QL IA: NORMAL
EOSINOPHIL # BLD: 0 K/UL (ref 0–0.6)
EOSINOPHIL NFR BLD: 0 %
G LAMBLIA AG STL QL IA: NORMAL
GFR SERPLBLD CREATININE-BSD FMLA CKD-EPI: >90 ML/MIN/{1.73_M2}
GLUCOSE SERPL-MCNC: 174 MG/DL (ref 70–99)
HCT VFR BLD AUTO: 29.2 % (ref 40.5–52.5)
HGB BLD-MCNC: 9 G/DL (ref 13.5–17.5)
LYMPHOCYTES # BLD: 0.3 K/UL (ref 1–5.1)
LYMPHOCYTES NFR BLD: 1.6 %
MCH RBC QN AUTO: 21.3 PG (ref 26–34)
MCHC RBC AUTO-ENTMCNC: 30.9 G/DL (ref 31–36)
MCV RBC AUTO: 69 FL (ref 80–100)
MONOCYTES # BLD: 0.5 K/UL (ref 0–1.3)
MONOCYTES NFR BLD: 3 %
NEUTROPHILS # BLD: 16.1 K/UL (ref 1.7–7.7)
NEUTROPHILS NFR BLD: 95.3 %
PATH INTERP BLD-IMP: NO
PLATELET # BLD AUTO: 271 K/UL (ref 135–450)
PLATELET BLD QL SMEAR: ADEQUATE
PMV BLD AUTO: 8.7 FL (ref 5–10.5)
POTASSIUM SERPL-SCNC: 4.5 MMOL/L (ref 3.5–5.1)
RBC # BLD AUTO: 4.24 M/UL (ref 4.2–5.9)
SLIDE REVIEW: ABNORMAL
SODIUM SERPL-SCNC: 139 MMOL/L (ref 136–145)
WBC # BLD AUTO: 16.9 K/UL (ref 4–11)

## 2025-08-30 PROCEDURE — 85025 COMPLETE CBC W/AUTO DIFF WBC: CPT

## 2025-08-30 PROCEDURE — 6370000000 HC RX 637 (ALT 250 FOR IP): Performed by: NURSE PRACTITIONER

## 2025-08-30 PROCEDURE — 2500000003 HC RX 250 WO HCPCS: Performed by: STUDENT IN AN ORGANIZED HEALTH CARE EDUCATION/TRAINING PROGRAM

## 2025-08-30 PROCEDURE — 6370000000 HC RX 637 (ALT 250 FOR IP): Performed by: STUDENT IN AN ORGANIZED HEALTH CARE EDUCATION/TRAINING PROGRAM

## 2025-08-30 PROCEDURE — 80048 BASIC METABOLIC PNL TOTAL CA: CPT

## 2025-08-30 PROCEDURE — 6360000002 HC RX W HCPCS: Performed by: NURSE PRACTITIONER

## 2025-08-30 PROCEDURE — 71045 X-RAY EXAM CHEST 1 VIEW: CPT

## 2025-08-30 PROCEDURE — 36415 COLL VENOUS BLD VENIPUNCTURE: CPT

## 2025-08-30 PROCEDURE — 85520 HEPARIN ASSAY: CPT

## 2025-08-30 PROCEDURE — 1200000000 HC SEMI PRIVATE

## 2025-08-30 RX ORDER — PREDNISONE 20 MG/1
20 TABLET ORAL DAILY
Status: DISCONTINUED | OUTPATIENT
Start: 2025-08-31 | End: 2025-08-31

## 2025-08-30 RX ORDER — LACTOBACILLUS RHAMNOSUS GG 10B CELL
1 CAPSULE ORAL
Status: DISCONTINUED | OUTPATIENT
Start: 2025-08-31 | End: 2025-09-04 | Stop reason: HOSPADM

## 2025-08-30 RX ORDER — VANCOMYCIN HYDROCHLORIDE 125 MG/1
125 CAPSULE ORAL EVERY 6 HOURS SCHEDULED
Status: DISCONTINUED | OUTPATIENT
Start: 2025-08-30 | End: 2025-09-04 | Stop reason: HOSPADM

## 2025-08-30 RX ADMIN — APIXABAN 10 MG: 5 TABLET, FILM COATED ORAL at 19:23

## 2025-08-30 RX ADMIN — APIXABAN 10 MG: 5 TABLET, FILM COATED ORAL at 11:31

## 2025-08-30 RX ADMIN — VANCOMYCIN HYDROCHLORIDE 125 MG: 125 CAPSULE ORAL at 18:15

## 2025-08-30 RX ADMIN — VANCOMYCIN HYDROCHLORIDE 125 MG: 125 CAPSULE ORAL at 12:33

## 2025-08-30 RX ADMIN — PREDNISONE 40 MG: 20 TABLET ORAL at 10:41

## 2025-08-30 RX ADMIN — HEPARIN SODIUM AND DEXTROSE 20 UNITS/KG/HR: 10000; 5 INJECTION INTRAVENOUS at 07:31

## 2025-08-30 RX ADMIN — HEPARIN SODIUM 2800 UNITS: 1000 INJECTION INTRAVENOUS; SUBCUTANEOUS at 07:34

## 2025-08-30 RX ADMIN — Medication 10 ML: at 19:23

## 2025-08-30 RX ADMIN — PANTOPRAZOLE SODIUM 40 MG: 40 TABLET, DELAYED RELEASE ORAL at 07:34

## 2025-08-30 ASSESSMENT — PAIN SCALES - GENERAL: PAINLEVEL_OUTOF10: 0

## 2025-08-31 LAB
ANION GAP SERPL CALCULATED.3IONS-SCNC: 13 MMOL/L (ref 3–16)
BILIRUB UR QL STRIP.AUTO: NEGATIVE
BUN SERPL-MCNC: 17 MG/DL (ref 7–20)
CALCIUM SERPL-MCNC: 9.3 MG/DL (ref 8.3–10.6)
CALPROTECTIN STL-MCNT: 670 UG/G
CHLORIDE SERPL-SCNC: 106 MMOL/L (ref 99–110)
CLARITY UR: CLEAR
CO2 SERPL-SCNC: 22 MMOL/L (ref 21–32)
COLOR UR: YELLOW
CREAT SERPL-MCNC: 1.2 MG/DL (ref 0.9–1.3)
DEPRECATED RDW RBC AUTO: 20.2 % (ref 12.4–15.4)
EPI CELLS #/AREA URNS HPF: ABNORMAL /HPF (ref 0–5)
GFR SERPLBLD CREATININE-BSD FMLA CKD-EPI: 80 ML/MIN/{1.73_M2}
GLUCOSE SERPL-MCNC: 144 MG/DL (ref 70–99)
GLUCOSE UR STRIP.AUTO-MCNC: 250 MG/DL
HCT VFR BLD AUTO: 28.8 % (ref 40.5–52.5)
HGB BLD-MCNC: 8.8 G/DL (ref 13.5–17.5)
HGB UR QL STRIP.AUTO: NEGATIVE
KETONES UR STRIP.AUTO-MCNC: NEGATIVE MG/DL
LEUKOCYTE ESTERASE UR QL STRIP.AUTO: NEGATIVE
MCH RBC QN AUTO: 20.9 PG (ref 26–34)
MCHC RBC AUTO-ENTMCNC: 30.6 G/DL (ref 31–36)
MCV RBC AUTO: 68.4 FL (ref 80–100)
MISCELLANEOUS LAB TEST ORDER: NORMAL
MUCOUS THREADS #/AREA URNS LPF: ABNORMAL /LPF
NITRITE UR QL STRIP.AUTO: NEGATIVE
PATH INTERP BLD-IMP: NO
PH UR STRIP.AUTO: 6.5 [PH] (ref 5–8)
PLATELET # BLD AUTO: 309 K/UL (ref 135–450)
PMV BLD AUTO: 8.4 FL (ref 5–10.5)
POTASSIUM SERPL-SCNC: 4.3 MMOL/L (ref 3.5–5.1)
PROCALCITONIN SERPL IA-MCNC: 3.02 NG/ML (ref 0–0.15)
PROT UR STRIP.AUTO-MCNC: ABNORMAL MG/DL
RBC # BLD AUTO: 4.21 M/UL (ref 4.2–5.9)
RBC #/AREA URNS HPF: ABNORMAL /HPF (ref 0–4)
SODIUM SERPL-SCNC: 141 MMOL/L (ref 136–145)
SP GR UR STRIP.AUTO: 1.01 (ref 1–1.03)
UA DIPSTICK W REFLEX MICRO PNL UR: YES
URN SPEC COLLECT METH UR: ABNORMAL
UROBILINOGEN UR STRIP-ACNC: 0.2 E.U./DL
WBC # BLD AUTO: 17.7 K/UL (ref 4–11)
WBC #/AREA URNS HPF: ABNORMAL /HPF (ref 0–5)

## 2025-08-31 PROCEDURE — 6370000000 HC RX 637 (ALT 250 FOR IP): Performed by: STUDENT IN AN ORGANIZED HEALTH CARE EDUCATION/TRAINING PROGRAM

## 2025-08-31 PROCEDURE — 6370000000 HC RX 637 (ALT 250 FOR IP): Performed by: NURSE PRACTITIONER

## 2025-08-31 PROCEDURE — 80048 BASIC METABOLIC PNL TOTAL CA: CPT

## 2025-08-31 PROCEDURE — 81001 URINALYSIS AUTO W/SCOPE: CPT

## 2025-08-31 PROCEDURE — 2500000003 HC RX 250 WO HCPCS: Performed by: STUDENT IN AN ORGANIZED HEALTH CARE EDUCATION/TRAINING PROGRAM

## 2025-08-31 PROCEDURE — 84145 PROCALCITONIN (PCT): CPT

## 2025-08-31 PROCEDURE — 6370000000 HC RX 637 (ALT 250 FOR IP): Performed by: INTERNAL MEDICINE

## 2025-08-31 PROCEDURE — 87086 URINE CULTURE/COLONY COUNT: CPT

## 2025-08-31 PROCEDURE — 92526 ORAL FUNCTION THERAPY: CPT

## 2025-08-31 PROCEDURE — 85027 COMPLETE CBC AUTOMATED: CPT

## 2025-08-31 PROCEDURE — 1200000000 HC SEMI PRIVATE

## 2025-08-31 PROCEDURE — 92610 EVALUATE SWALLOWING FUNCTION: CPT

## 2025-08-31 RX ORDER — PREDNISONE 10 MG/1
10 TABLET ORAL DAILY
Status: DISCONTINUED | OUTPATIENT
Start: 2025-09-01 | End: 2025-09-04 | Stop reason: HOSPADM

## 2025-08-31 RX ORDER — LEVOFLOXACIN 500 MG/1
750 TABLET, FILM COATED ORAL EVERY 24 HOURS
Status: COMPLETED | OUTPATIENT
Start: 2025-08-31 | End: 2025-09-04

## 2025-08-31 RX ADMIN — LEVOFLOXACIN 750 MG: 500 TABLET, FILM COATED ORAL at 09:58

## 2025-08-31 RX ADMIN — VANCOMYCIN HYDROCHLORIDE 125 MG: 125 CAPSULE ORAL at 18:34

## 2025-08-31 RX ADMIN — VANCOMYCIN HYDROCHLORIDE 125 MG: 125 CAPSULE ORAL at 23:36

## 2025-08-31 RX ADMIN — VANCOMYCIN HYDROCHLORIDE 125 MG: 125 CAPSULE ORAL at 13:16

## 2025-08-31 RX ADMIN — Medication 10 ML: at 09:58

## 2025-08-31 RX ADMIN — CARBAMIDE PEROXIDE 5 DROP: 65 SOLUTION/ DROPS TOPICAL at 20:57

## 2025-08-31 RX ADMIN — Medication 1 CAPSULE: at 09:58

## 2025-08-31 RX ADMIN — PREDNISONE 20 MG: 20 TABLET ORAL at 09:58

## 2025-08-31 RX ADMIN — PANTOPRAZOLE SODIUM 40 MG: 40 TABLET, DELAYED RELEASE ORAL at 06:20

## 2025-08-31 RX ADMIN — VANCOMYCIN HYDROCHLORIDE 125 MG: 125 CAPSULE ORAL at 00:45

## 2025-08-31 RX ADMIN — VANCOMYCIN HYDROCHLORIDE 125 MG: 125 CAPSULE ORAL at 06:20

## 2025-08-31 RX ADMIN — APIXABAN 10 MG: 5 TABLET, FILM COATED ORAL at 09:58

## 2025-08-31 RX ADMIN — APIXABAN 10 MG: 5 TABLET, FILM COATED ORAL at 20:57

## 2025-08-31 ASSESSMENT — PAIN DESCRIPTION - LOCATION: LOCATION: ABDOMEN;BACK

## 2025-08-31 ASSESSMENT — PAIN SCALES - GENERAL
PAINLEVEL_OUTOF10: 2
PAINLEVEL_OUTOF10: 0

## 2025-09-01 LAB
ANION GAP SERPL CALCULATED.3IONS-SCNC: 13 MMOL/L (ref 3–16)
BACTERIA UR CULT: NORMAL
BUN SERPL-MCNC: 19 MG/DL (ref 7–20)
CALCIUM SERPL-MCNC: 9.7 MG/DL (ref 8.3–10.6)
CHLORIDE SERPL-SCNC: 107 MMOL/L (ref 99–110)
CO2 SERPL-SCNC: 22 MMOL/L (ref 21–32)
CREAT SERPL-MCNC: 1.1 MG/DL (ref 0.9–1.3)
DEPRECATED RDW RBC AUTO: 20.4 % (ref 12.4–15.4)
ELASTASE PANC STL-MCNT: 172 UG/G
FERRITIN SERPL IA-MCNC: 106 NG/ML (ref 30–400)
GFR SERPLBLD CREATININE-BSD FMLA CKD-EPI: 89 ML/MIN/{1.73_M2}
GLUCOSE SERPL-MCNC: 104 MG/DL (ref 70–99)
HCT VFR BLD AUTO: 28.6 % (ref 40.5–52.5)
HGB BLD-MCNC: 8.6 G/DL (ref 13.5–17.5)
IRON SATN MFR SERPL: 9 % (ref 20–50)
IRON SERPL-MCNC: 25 UG/DL (ref 59–158)
MCH RBC QN AUTO: 21 PG (ref 26–34)
MCHC RBC AUTO-ENTMCNC: 30.2 G/DL (ref 31–36)
MCV RBC AUTO: 69.6 FL (ref 80–100)
PATH INTERP BLD-IMP: NO
PLATELET # BLD AUTO: 275 K/UL (ref 135–450)
PMV BLD AUTO: 8 FL (ref 5–10.5)
POTASSIUM SERPL-SCNC: 4.4 MMOL/L (ref 3.5–5.1)
RBC # BLD AUTO: 4.11 M/UL (ref 4.2–5.9)
SODIUM SERPL-SCNC: 142 MMOL/L (ref 136–145)
TIBC SERPL-MCNC: 265 UG/DL (ref 260–445)
WBC # BLD AUTO: 11.9 K/UL (ref 4–11)

## 2025-09-01 PROCEDURE — 83550 IRON BINDING TEST: CPT

## 2025-09-01 PROCEDURE — 6370000000 HC RX 637 (ALT 250 FOR IP): Performed by: INTERNAL MEDICINE

## 2025-09-01 PROCEDURE — 83540 ASSAY OF IRON: CPT

## 2025-09-01 PROCEDURE — 36415 COLL VENOUS BLD VENIPUNCTURE: CPT

## 2025-09-01 PROCEDURE — 1200000000 HC SEMI PRIVATE

## 2025-09-01 PROCEDURE — 82728 ASSAY OF FERRITIN: CPT

## 2025-09-01 PROCEDURE — 6370000000 HC RX 637 (ALT 250 FOR IP): Performed by: STUDENT IN AN ORGANIZED HEALTH CARE EDUCATION/TRAINING PROGRAM

## 2025-09-01 PROCEDURE — 85027 COMPLETE CBC AUTOMATED: CPT

## 2025-09-01 PROCEDURE — 80048 BASIC METABOLIC PNL TOTAL CA: CPT

## 2025-09-01 PROCEDURE — 6370000000 HC RX 637 (ALT 250 FOR IP): Performed by: NURSE PRACTITIONER

## 2025-09-01 RX ORDER — LEVOFLOXACIN 750 MG/1
750 TABLET, FILM COATED ORAL EVERY 24 HOURS
Qty: 3 TABLET | Refills: 0
Start: 2025-09-02 | End: 2025-09-05

## 2025-09-01 RX ORDER — PREDNISONE 10 MG/1
10 TABLET ORAL DAILY
Qty: 30 TABLET | Refills: 0
Start: 2025-09-02 | End: 2025-10-02

## 2025-09-01 RX ORDER — LACTOBACILLUS RHAMNOSUS GG 10B CELL
1 CAPSULE ORAL
Qty: 60 CAPSULE | Refills: 1
Start: 2025-09-02

## 2025-09-01 RX ORDER — VANCOMYCIN HYDROCHLORIDE 125 MG/1
125 CAPSULE ORAL 4 TIMES DAILY
Qty: 40 CAPSULE | Refills: 0
Start: 2025-09-01 | End: 2025-09-11

## 2025-09-01 RX ADMIN — PREDNISONE 10 MG: 10 TABLET ORAL at 08:50

## 2025-09-01 RX ADMIN — Medication 1 CAPSULE: at 08:49

## 2025-09-01 RX ADMIN — VANCOMYCIN HYDROCHLORIDE 125 MG: 125 CAPSULE ORAL at 18:27

## 2025-09-01 RX ADMIN — LEVOFLOXACIN 750 MG: 500 TABLET, FILM COATED ORAL at 08:49

## 2025-09-01 RX ADMIN — VANCOMYCIN HYDROCHLORIDE 125 MG: 125 CAPSULE ORAL at 12:11

## 2025-09-01 RX ADMIN — PANTOPRAZOLE SODIUM 40 MG: 40 TABLET, DELAYED RELEASE ORAL at 06:06

## 2025-09-01 RX ADMIN — VANCOMYCIN HYDROCHLORIDE 125 MG: 125 CAPSULE ORAL at 06:06

## 2025-09-01 RX ADMIN — APIXABAN 10 MG: 5 TABLET, FILM COATED ORAL at 08:50

## 2025-09-01 RX ADMIN — APIXABAN 10 MG: 5 TABLET, FILM COATED ORAL at 20:12

## 2025-09-01 ASSESSMENT — PAIN SCALES - GENERAL: PAINLEVEL_OUTOF10: 0

## 2025-09-02 LAB — PATH INTERP BLD-IMP: NORMAL

## 2025-09-02 PROCEDURE — 6370000000 HC RX 637 (ALT 250 FOR IP): Performed by: INTERNAL MEDICINE

## 2025-09-02 PROCEDURE — 97116 GAIT TRAINING THERAPY: CPT

## 2025-09-02 PROCEDURE — 6370000000 HC RX 637 (ALT 250 FOR IP): Performed by: NURSE PRACTITIONER

## 2025-09-02 PROCEDURE — 97535 SELF CARE MNGMENT TRAINING: CPT

## 2025-09-02 PROCEDURE — 6370000000 HC RX 637 (ALT 250 FOR IP): Performed by: STUDENT IN AN ORGANIZED HEALTH CARE EDUCATION/TRAINING PROGRAM

## 2025-09-02 PROCEDURE — 97530 THERAPEUTIC ACTIVITIES: CPT

## 2025-09-02 PROCEDURE — 1200000000 HC SEMI PRIVATE

## 2025-09-02 RX ADMIN — VANCOMYCIN HYDROCHLORIDE 125 MG: 125 CAPSULE ORAL at 05:32

## 2025-09-02 RX ADMIN — Medication 1 CAPSULE: at 09:45

## 2025-09-02 RX ADMIN — APIXABAN 10 MG: 5 TABLET, FILM COATED ORAL at 09:45

## 2025-09-02 RX ADMIN — VANCOMYCIN HYDROCHLORIDE 125 MG: 125 CAPSULE ORAL at 17:47

## 2025-09-02 RX ADMIN — VANCOMYCIN HYDROCHLORIDE 125 MG: 125 CAPSULE ORAL at 00:56

## 2025-09-02 RX ADMIN — LEVOFLOXACIN 750 MG: 500 TABLET, FILM COATED ORAL at 09:45

## 2025-09-02 RX ADMIN — PREDNISONE 10 MG: 10 TABLET ORAL at 09:46

## 2025-09-02 RX ADMIN — APIXABAN 10 MG: 5 TABLET, FILM COATED ORAL at 20:18

## 2025-09-02 RX ADMIN — PANTOPRAZOLE SODIUM 40 MG: 40 TABLET, DELAYED RELEASE ORAL at 05:32

## 2025-09-02 RX ADMIN — VANCOMYCIN HYDROCHLORIDE 125 MG: 125 CAPSULE ORAL at 12:20

## 2025-09-02 ASSESSMENT — PAIN SCALES - GENERAL: PAINLEVEL_OUTOF10: 0

## 2025-09-03 PROCEDURE — 97530 THERAPEUTIC ACTIVITIES: CPT

## 2025-09-03 PROCEDURE — 6370000000 HC RX 637 (ALT 250 FOR IP): Performed by: NURSE PRACTITIONER

## 2025-09-03 PROCEDURE — 1200000000 HC SEMI PRIVATE

## 2025-09-03 PROCEDURE — 97110 THERAPEUTIC EXERCISES: CPT

## 2025-09-03 PROCEDURE — 97116 GAIT TRAINING THERAPY: CPT

## 2025-09-03 PROCEDURE — 92526 ORAL FUNCTION THERAPY: CPT

## 2025-09-03 PROCEDURE — 6370000000 HC RX 637 (ALT 250 FOR IP): Performed by: STUDENT IN AN ORGANIZED HEALTH CARE EDUCATION/TRAINING PROGRAM

## 2025-09-03 PROCEDURE — 6370000000 HC RX 637 (ALT 250 FOR IP): Performed by: INTERNAL MEDICINE

## 2025-09-03 RX ADMIN — VANCOMYCIN HYDROCHLORIDE 125 MG: 125 CAPSULE ORAL at 18:53

## 2025-09-03 RX ADMIN — APIXABAN 10 MG: 5 TABLET, FILM COATED ORAL at 08:18

## 2025-09-03 RX ADMIN — PANTOPRAZOLE SODIUM 40 MG: 40 TABLET, DELAYED RELEASE ORAL at 06:59

## 2025-09-03 RX ADMIN — VANCOMYCIN HYDROCHLORIDE 125 MG: 125 CAPSULE ORAL at 23:49

## 2025-09-03 RX ADMIN — VANCOMYCIN HYDROCHLORIDE 125 MG: 125 CAPSULE ORAL at 06:59

## 2025-09-03 RX ADMIN — VANCOMYCIN HYDROCHLORIDE 125 MG: 125 CAPSULE ORAL at 00:52

## 2025-09-03 RX ADMIN — APIXABAN 10 MG: 5 TABLET, FILM COATED ORAL at 20:04

## 2025-09-03 RX ADMIN — VANCOMYCIN HYDROCHLORIDE 125 MG: 125 CAPSULE ORAL at 13:20

## 2025-09-03 RX ADMIN — Medication 1 CAPSULE: at 08:19

## 2025-09-03 RX ADMIN — LEVOFLOXACIN 750 MG: 500 TABLET, FILM COATED ORAL at 08:18

## 2025-09-03 RX ADMIN — PREDNISONE 10 MG: 10 TABLET ORAL at 08:19

## 2025-09-03 ASSESSMENT — PAIN SCALES - GENERAL: PAINLEVEL_OUTOF10: 0

## 2025-09-04 VITALS
WEIGHT: 153 LBS | HEIGHT: 61 IN | BODY MASS INDEX: 28.89 KG/M2 | DIASTOLIC BLOOD PRESSURE: 77 MMHG | HEART RATE: 113 BPM | SYSTOLIC BLOOD PRESSURE: 111 MMHG | OXYGEN SATURATION: 100 % | RESPIRATION RATE: 18 BRPM | TEMPERATURE: 97.5 F

## 2025-09-04 PROCEDURE — 6370000000 HC RX 637 (ALT 250 FOR IP): Performed by: STUDENT IN AN ORGANIZED HEALTH CARE EDUCATION/TRAINING PROGRAM

## 2025-09-04 PROCEDURE — 6370000000 HC RX 637 (ALT 250 FOR IP): Performed by: NURSE PRACTITIONER

## 2025-09-04 PROCEDURE — 6370000000 HC RX 637 (ALT 250 FOR IP): Performed by: INTERNAL MEDICINE

## 2025-09-04 RX ADMIN — VANCOMYCIN HYDROCHLORIDE 125 MG: 125 CAPSULE ORAL at 06:16

## 2025-09-04 RX ADMIN — LEVOFLOXACIN 750 MG: 500 TABLET, FILM COATED ORAL at 10:03

## 2025-09-04 RX ADMIN — PANTOPRAZOLE SODIUM 40 MG: 40 TABLET, DELAYED RELEASE ORAL at 06:16

## 2025-09-04 RX ADMIN — Medication 1 CAPSULE: at 10:03

## 2025-09-04 RX ADMIN — VANCOMYCIN HYDROCHLORIDE 125 MG: 125 CAPSULE ORAL at 12:10

## 2025-09-04 RX ADMIN — PREDNISONE 10 MG: 10 TABLET ORAL at 10:03

## 2025-09-04 RX ADMIN — APIXABAN 10 MG: 5 TABLET, FILM COATED ORAL at 10:03

## 2025-09-04 ASSESSMENT — PAIN SCALES - GENERAL: PAINLEVEL_OUTOF10: 0

## (undated) DEVICE — Z DISCONTINUED USE 2276105 GOWN PROTCT UNIV CHST W28IN L49IN SL 24IN BLU SPUNBOND FLM

## (undated) DEVICE — AIRLIFE™ NASAL OXYGEN CANNULA CURVED, FLARED TIP, WITH 7 FEET (2.1 M) CRUSH RESISTANT TUBING, OVER-THE-EAR STYLE: Brand: AIRLIFE™

## (undated) DEVICE — SOLUTION IV 1000ML LAC RINGERS PH 6.5 INJ USP VIAFLX PLAS

## (undated) DEVICE — 3M™ TEGADERM™ TRANSPARENT FILM DRESSING FRAME STYLE, 1624W, 2-3/8 IN X 2-3/4 IN (6 CM X 7 CM), 100/CT 4CT/CASE: Brand: 3M™ TEGADERM™

## (undated) DEVICE — FORCEPS BX L240CM DIA2.4MM L NDL RAD JAW 4 133340

## (undated) DEVICE — ELECTRODE ECG MONITR FOAM TEAR DROP ADLT RED

## (undated) DEVICE — CANNULA NSL AD TBNG L7FT PVC STR NONFLARED PRNG O2 DEL W STD

## (undated) DEVICE — CONMED SCOPE SAVER BITE BLOCK, 20X27 MM: Brand: SCOPE SAVER

## (undated) DEVICE — Device: Brand: JELCO

## (undated) DEVICE — ENDOSCOPIC KIT 2 12 FT OP4 DE2 GWN SYR

## (undated) DEVICE — NEEDLE 25GAX5.0MM INJ CARR LOCKE

## (undated) DEVICE — DEFENDO AIR WATER SUCTION AND BIOPSY VALVE KIT FOR  OLYMPUS: Brand: DEFENDO AIR/WATER/SUCTION AND BIOPSY VALVE

## (undated) DEVICE — FORCEPS BX L240CM JAW DIA2.8MM L CAP W/ NDL MIC MESH TOOTH

## (undated) DEVICE — SET GRAV VENT NVENT CK VLV 3 NDL FREE PRT 10 GTT

## (undated) DEVICE — ELECTRODE,ECG,STRESS,FOAM,3PK: Brand: MEDLINE